# Patient Record
Sex: FEMALE | Race: WHITE | NOT HISPANIC OR LATINO | Employment: OTHER | URBAN - METROPOLITAN AREA
[De-identification: names, ages, dates, MRNs, and addresses within clinical notes are randomized per-mention and may not be internally consistent; named-entity substitution may affect disease eponyms.]

---

## 2017-01-12 ENCOUNTER — OFFICE VISIT (OUTPATIENT)
Dept: OTHER | Facility: HOSPITAL | Age: 72
End: 2017-01-12
Attending: THORACIC SURGERY (CARDIOTHORACIC VASCULAR SURGERY)

## 2017-01-12 VITALS
SYSTOLIC BLOOD PRESSURE: 154 MMHG | HEART RATE: 77 BPM | DIASTOLIC BLOOD PRESSURE: 88 MMHG | OXYGEN SATURATION: 99 % | HEIGHT: 66 IN | RESPIRATION RATE: 16 BRPM | WEIGHT: 148 LBS | BODY MASS INDEX: 23.78 KG/M2 | TEMPERATURE: 96.8 F

## 2017-01-12 DIAGNOSIS — C34.31 MALIGNANT NEOPLASM OF LOWER LOBE, RIGHT BRONCHUS OR LUNG (HCC): ICD-10-CM

## 2017-01-12 DIAGNOSIS — R91.8 MASS OF RIGHT LUNG: Primary | ICD-10-CM

## 2017-01-12 DIAGNOSIS — J43.8 OTHER EMPHYSEMA (HCC): ICD-10-CM

## 2017-01-12 PROBLEM — J44.9 COPD (CHRONIC OBSTRUCTIVE PULMONARY DISEASE): Status: ACTIVE | Noted: 2017-01-12

## 2017-01-12 PROCEDURE — 99205 OFFICE O/P NEW HI 60 MIN: CPT | Performed by: THORACIC SURGERY (CARDIOTHORACIC VASCULAR SURGERY)

## 2017-01-12 RX ORDER — SIMVASTATIN 40 MG
20 TABLET ORAL NIGHTLY
COMMUNITY
End: 2017-03-16 | Stop reason: SDUPTHER

## 2017-01-12 RX ORDER — LEVOTHYROXINE SODIUM 0.07 MG/1
75 TABLET ORAL DAILY
COMMUNITY
Start: 2017-01-03

## 2017-01-12 RX ORDER — AMOXICILLIN 500 MG
1200 CAPSULE ORAL
COMMUNITY
End: 2021-06-28

## 2017-01-12 RX ORDER — OMEPRAZOLE 20 MG/1
20 CAPSULE, DELAYED RELEASE ORAL DAILY
COMMUNITY
Start: 2017-01-03

## 2017-01-12 RX ORDER — MULTIVITAMIN
1 CAPSULE ORAL DAILY
COMMUNITY
End: 2021-07-17 | Stop reason: HOSPADM

## 2017-01-12 RX ORDER — ESTRADIOL 0.5 MG/1
0.5 TABLET ORAL DAILY
COMMUNITY
End: 2020-10-08

## 2017-01-12 RX ORDER — ASPIRIN 81 MG/1
81 TABLET ORAL DAILY
COMMUNITY
End: 2019-11-06

## 2017-01-12 NOTE — PROGRESS NOTES
Subjective   Patient ID: Jerilyn Martinez is a 71 y.o. female is being seen for consultation today at the request of Felisha Baez MD    History of Present Illness    Jerilyn Martinez was seen in care center at [facility] today January 12, 2017 as part of our multidisciplinary lung cancer clinic.  I have reviewed her history her x-rays and have examined her.  Thank you for asking us to participate in the care of Mrs. Martinez.    Mrs. Martinez is a 71-year-old  female.  She was seen by her family physician for her annual checkup.  She complained of cough and chest wall pain.  The chest x-ray was obtained because of her former smoking history and strong family history of lung cancer.  This was abnormal and a CT of the chest was then performed.  She is here now for further evaluation    She is a former smoker.  She has smoked for 15-20 years at least 2 packs cigarettes per day.  She has had significant secondhand smoke exposure.  She grew up in the [facility] in [place] and has had industrial exposure.  She has a dry nonproductive cough.  She has had no hemoptysis.  She has a history of gastroesophageal reflux disease and has a sore throat with some hoarseness.  She has intentional weight loss.  She gets short of breath climbing steps.  She has no personal history of cancer    The following portions of the patient's history were reviewed and updated as appropriate: allergies, current medications, past family history, past medical history, past social history, past surgical history and problem list.  Review of Systems   Constitution: Negative.        Trouble sleeping   HENT: Positive for sore throat.    Eyes: Negative.    Cardiovascular: Negative.    Respiratory: Positive for cough.    Endocrine: Negative.    Hematologic/Lymphatic: Negative.    Skin: Negative.    Musculoskeletal: Positive for back pain, joint pain, joint swelling and stiffness.   Gastrointestinal: Positive for heartburn.    Genitourinary: Negative.    Neurological: Negative.    Psychiatric/Behavioral: Negative.    All other systems reviewed and are negative.    Patient Active Problem List   Diagnosis   • Gastroesophageal reflux disease   • Iron deficiency anemia   • Long term current use of non-steroidal anti-inflammatories (NSAID)   • Mass of left lung   • Mass of right lung   • COPD (chronic obstructive pulmonary disease)     Past Medical History   Diagnosis Date   • Arthritis      Past Surgical History   Procedure Laterality Date   • Bunionectomy     • Thyroid surgery       Family History   Problem Relation Age of Onset   • Lung cancer Mother    • Lung cancer Father    • Lung cancer Sister      Social History     Social History   • Marital status: Single     Spouse name: N/A   • Number of children: N/A   • Years of education: N/A     Occupational History   • Not on file.     Social History Main Topics   • Smoking status: Former Smoker     Packs/day: 2.00     Years: 20.00     Types: Cigarettes     Quit date: 1987   • Smokeless tobacco: Not on file   • Alcohol use Yes      Comment: socially   • Drug use: Not on file   • Sexual activity: Not on file     Other Topics Concern   • Not on file     Social History Narrative   • No narrative on file       Current Outpatient Prescriptions:   •  aspirin 81 MG EC tablet, Take 81 mg by mouth., Disp: , Rfl:   •  Calcium Carbonate-Vitamin D 600-200 MG-UNIT tablet, Take  by mouth., Disp: , Rfl:   •  Cholecalciferol 1000 UNITS capsule, Take  by mouth., Disp: , Rfl:   •  cyanocobalamin 100 MCG tablet, Take  by mouth., Disp: , Rfl:   •  estradiol (ESTRACE) 0.5 MG tablet, Take 0.5 mg by mouth Daily., Disp: , Rfl:   •  levothyroxine (SYNTHROID, LEVOTHROID) 75 MCG tablet, , Disp: , Rfl:   •  Multiple Vitamin (MULTIVITAMIN) capsule, Take 1 capsule by mouth., Disp: , Rfl:   •  Omega-3 Fatty Acids (FISH OIL) 1200 MG capsule capsule, Take  by mouth., Disp: , Rfl:   •  omeprazole (priLOSEC) 20 MG capsule, ,  Disp: , Rfl:   •  simvastatin (ZOCOR) 40 MG tablet, Take 20 mg by mouth Daily., Disp: , Rfl:   Allergies   Allergen Reactions   • Codeine Shortness Of Breath        Objective   Vitals:    01/12/17 0858   BP: 154/88   Pulse: 77   Resp: 16   Temp: 96.8 °F (36 °C)   SpO2: 99%     Physical Exam   Constitutional: She is oriented to person, place, and time. Vital signs are normal. She appears well-developed.   HENT:   Head: Normocephalic and atraumatic.   Neck: Normal range of motion. Neck supple.   Cardiovascular: Normal rate, regular rhythm, normal heart sounds and intact distal pulses.    No murmur heard.  Pulmonary/Chest: Effort normal and breath sounds normal. She has no wheezes. She has no rhonchi. She has no rales. She exhibits no tenderness.   Abdominal: Soft. There is no tenderness.   Musculoskeletal: Normal range of motion. She exhibits no edema or tenderness.   Neurological: She is alert and oriented to person, place, and time. She has normal strength.   Skin: Skin is warm and dry. No rash noted. No cyanosis or erythema.   Psychiatric: She has a normal mood and affect. Her behavior is normal.       Assessment/Plan   Independent Review of Radiographic Studies:    CT scan of the chest was independently reviewed.  There is a 2 cm mass in the right upper lobe of the lung. This is spiculated and has all the characteristics of a primary lung cancer.  There is a 3 mm noncalcified nodule in the left lower lobe.  There are no hilar or mediastinal lymph nodes.  There is a 1.2 cm left thyroid nodule.  There is a pectus deformity of the chest wall.  There is a 1.7 cm cyst of the upper pole of the left kidney.  No other abnormalities were seen on x-ray.    Discussion: Given the information at hand it appears that this lady has a clinical T1 N0 Mx  carcinoma of the right lung.  I have recommended a CT directed needle biopsy for tissue confirmation.  I have ordered a CT PET scan to complete the staging. Because of her long  smoking history, we have also ordered pulmonary function tests  to evaluate her pulmonary reserve.  Once these studies are completed and the information is available her case will be discussed in our multidisciplinary thoracic oncology conference.  I will keep you informed of her progress.  Thank you for allowing me to participate in the care of Mrs. Martinez.    Diagnoses and all orders for this visit:    Mass of right lung  -     CT Needle Biopsy Lung; Future  -     NM Pet Skull Base To Mid Thigh; Future    Other emphysema  -     Full Pulmonary Function Test With Bronchodilator & ABG; Future    Malignant neoplasm of lower lobe, right bronchus or lung   -     NM Pet Skull Base To Mid Thigh; Future    Other orders  -     simvastatin (ZOCOR) 40 MG tablet; Take 20 mg by mouth Daily.  -     aspirin 81 MG EC tablet; Take 81 mg by mouth.  -     Calcium Carbonate-Vitamin D 600-200 MG-UNIT tablet; Take  by mouth.  -     Cholecalciferol 1000 UNITS capsule; Take  by mouth.  -     cyanocobalamin 100 MCG tablet; Take  by mouth.  -     Multiple Vitamin (MULTIVITAMIN) capsule; Take 1 capsule by mouth.  -     Omega-3 Fatty Acids (FISH OIL) 1200 MG capsule capsule; Take  by mouth.  -     levothyroxine (SYNTHROID, LEVOTHROID) 75 MCG tablet;   -     omeprazole (priLOSEC) 20 MG capsule;   -     estradiol (ESTRACE) 0.5 MG tablet; Take 0.5 mg by mouth Daily.

## 2017-01-12 NOTE — MR AVS SNAPSHOT
Jerilyn Martinez   2017 8:30 AM   Office Visit    Dept Phone:  [phone]   Encounter #:  [id number]    Provider:  Yuri Brizuela III, MD   Department:  [facility] MULTIDISCIPLINARY CLINIC                Your Full Care Plan              Your Updated Medication List          This list is accurate as of: 17  9:57 AM.  Always use your most recent med list.                aspirin 81 MG EC tablet       Calcium Carbonate-Vitamin D 600-200 MG-UNIT tablet       Cholecalciferol 1000 UNITS capsule       cyanocobalamin 100 MCG tablet       estradiol 0.5 MG tablet   Commonly known as:  ESTRACE       fish oil 1200 MG capsule capsule       levothyroxine 75 MCG tablet   Commonly known as:  SYNTHROID, LEVOTHROID       multivitamin capsule       omeprazole 20 MG capsule   Commonly known as:  priLOSEC       simvastatin 40 MG tablet   Commonly known as:  ZOCOR               Instructions     None    Patient Instructions History      Upcoming Appointments     Visit Type Date Time Department    LUNG CARE VISIT 2017  8:30 AM [facility] DIS[facility]      [employer] Signup     [facility] [employer] allows you to send messages to your doctor, view your test results, renew your prescriptions, schedule appointments, and more. To sign up, go to [employer] and click on the Sign Up Now link in the New User? box. Enter your [employer] Activation Code exactly as it appears below along with the last four digits of your Social Security Number and your Date of Birth () to complete the sign-up process. If you do not sign up before the expiration date, you must request a new code.    [employer] Activation Code: [email or web address]  Expires: 2017  9:57 AM    If you have questions, you can email [email or web address] or call [phone] to talk to our [employer] staff. Remember, [employer] is NOT to be used for urgent needs. For medical emergencies, dial 911.               Other Info  "from Your Visit           Allergies     Codeine  Shortness Of Breath      Reason for Visit     Lung Nodule           Vital Signs     Blood Pressure Pulse Temperature Respirations Height Weight    154/88 77 96.8 °F (36 °C) (Oral) 16 66\" (167.6 cm) 148 lb (67.1 kg)    Oxygen Saturation Body Mass Index Smoking Status             99% 23.89 kg/m2 Former Smoker           "

## 2017-01-12 NOTE — LETTER
January 12, 2017     Felisha Baez MD  [address]  [address] [address]    Patient: Jerilyn Martinez   YOB: 1945   Date of Visit: 1/12/2017       Dear Dr. Sasha MD:    Thank you for referring Jerilyn Martinez to me for evaluation. Below are the relevant portions of my assessment and plan of care.    If you have questions, please do not hesitate to call me. I look forward to following Jerilyn along with you.         Sincerely,        Yuri Brizuela III, MD        CC: MD Yuri Storey III, MD  1/12/2017  2:19 PM  Sign at close encounter  Subjective   Patient ID: Jerilyn Martinez is a 71 y.o. female is being seen for consultation today at the request of Felisha Baez MD    History of Present Illness    Jerilyn Martinez [facility], 2017 as part of our multidisciplinary lung cancer clinic.  I have reviewed her history her x-rays and have examined her.  Thank you for asking us to participate in the care of Mrs. Martinez.    Mrs. Martinez is a 71-year-old  female.  She was seen by her family physician for her annual checkup.  She complained of cough and chest wall pain.  The chest x-ray was obtained because of her former smoking history and strong family history of lung cancer.  This was abnormal and a CT of the chest was then performed.  She is here now for further evaluation    She is a former smoker.  She has smoked for 15-20 years at least 2 packs cigarettes per day.  She has had significant secondhand smoke exposure.  She grew up in the [facility] area in [place] and has had industrial exposure.  She has a dry nonproductive cough.  She has had no hemoptysis.  She has a history of gastroesophageal reflux disease and has a sore throat with some hoarseness.  She has intentional weight loss.  She gets short of breath climbing steps.  She has no personal history of cancer    The following portions of the  patient's history were reviewed and updated as appropriate: allergies, current medications, past family history, past medical history, past social history, past surgical history and problem list.  Review of Systems   Constitution: Negative.        Trouble sleeping   HENT: Positive for sore throat.    Eyes: Negative.    Cardiovascular: Negative.    Respiratory: Positive for cough.    Endocrine: Negative.    Hematologic/Lymphatic: Negative.    Skin: Negative.    Musculoskeletal: Positive for back pain, joint pain, joint swelling and stiffness.   Gastrointestinal: Positive for heartburn.   Genitourinary: Negative.    Neurological: Negative.    Psychiatric/Behavioral: Negative.    All other systems reviewed and are negative.    Patient Active Problem List   Diagnosis   • Gastroesophageal reflux disease   • Iron deficiency anemia   • Long term current use of non-steroidal anti-inflammatories (NSAID)   • Mass of left lung   • Mass of right lung   • COPD (chronic obstructive pulmonary disease)     Past Medical History   Diagnosis Date   • Arthritis      Past Surgical History   Procedure Laterality Date   • Bunionectomy     • Thyroid surgery       Family History   Problem Relation Age of Onset   • Lung cancer Mother    • Lung cancer Father    • Lung cancer Sister      Social History     Social History   • Marital status: Single     Spouse name: N/A   • Number of children: N/A   • Years of education: N/A     Occupational History   • Not on file.     Social History Main Topics   • Smoking status: Former Smoker     Packs/day: 2.00     Years: 20.00     Types: Cigarettes     Quit date: 1987   • Smokeless tobacco: Not on file   • Alcohol use Yes      Comment: socially   • Drug use: Not on file   • Sexual activity: Not on file     Other Topics Concern   • Not on file     Social History Narrative   • No narrative on file       Current Outpatient Prescriptions:   •  aspirin 81 MG EC tablet, Take 81 mg by mouth., Disp: , Rfl:   •   Calcium Carbonate-Vitamin D 600-200 MG-UNIT tablet, Take  by mouth., Disp: , Rfl:   •  Cholecalciferol 1000 UNITS capsule, Take  by mouth., Disp: , Rfl:   •  cyanocobalamin 100 MCG tablet, Take  by mouth., Disp: , Rfl:   •  estradiol (ESTRACE) 0.5 MG tablet, Take 0.5 mg by mouth Daily., Disp: , Rfl:   •  levothyroxine (SYNTHROID, LEVOTHROID) 75 MCG tablet, , Disp: , Rfl:   •  Multiple Vitamin (MULTIVITAMIN) capsule, Take 1 capsule by mouth., Disp: , Rfl:   •  Omega-3 Fatty Acids (FISH OIL) 1200 MG capsule capsule, Take  by mouth., Disp: , Rfl:   •  omeprazole (priLOSEC) 20 MG capsule, , Disp: , Rfl:   •  simvastatin (ZOCOR) 40 MG tablet, Take 20 mg by mouth Daily., Disp: , Rfl:   Allergies   Allergen Reactions   • Codeine Shortness Of Breath        Objective   Vitals:    01/12/17 0858   BP: 154/88   Pulse: 77   Resp: 16   Temp: 96.8 °F (36 °C)   SpO2: 99%     Physical Exam   Constitutional: She is oriented to person, place, and time. Vital signs are normal. She appears well-developed.   HENT:   Head: Normocephalic and atraumatic.   Neck: Normal range of motion. Neck supple.   Cardiovascular: Normal rate, regular rhythm, normal heart sounds and intact distal pulses.    No murmur heard.  Pulmonary/Chest: Effort normal and breath sounds normal. She has no wheezes. She has no rhonchi. She has no rales. She exhibits no tenderness.   Abdominal: Soft. There is no tenderness.   Musculoskeletal: Normal range of motion. She exhibits no edema or tenderness.   Neurological: She is alert and oriented to person, place, and time. She has normal strength.   Skin: Skin is warm and dry. No rash noted. No cyanosis or erythema.   Psychiatric: She has a normal mood and affect. Her behavior is normal.       Assessment/Plan   Independent Review of Radiographic Studies:    CT scan of the chest was independently reviewed.  There is a 2 cm mass in the right upper lobe of the lung. This is spiculated and has all the characteristics of a  primary lung cancer.  There is a 3 mm noncalcified nodule in the left lower lobe.  There are no hilar or mediastinal lymph nodes.  There is a 1.2 cm left thyroid nodule.  There is a pectus deformity of the chest wall.  There is a 1.7 cm cyst of the upper pole of the left kidney.  No other abnormalities were seen on x-ray.    Discussion: Given the information at hand it appears that this lady has a clinical T1 N0 Mx  carcinoma of the right lung.  I have recommended a CT directed needle biopsy for tissue confirmation.  I have ordered a CT PET scan to complete the staging. Because of her long smoking history, we have also ordered pulmonary function tests  to evaluate her pulmonary reserve.  Once these studies are completed and the information is available her case will be discussed in our multidisciplinary thoracic oncology conference.  I will keep you informed of her progress.  Thank you for allowing me to participate in the care of Mrs. Martinez.    Diagnoses and all orders for this visit:    Mass of right lung  -     CT Needle Biopsy Lung; Future  -     NM Pet Skull Base To Mid Thigh; Future    Other emphysema  -     Full Pulmonary Function Test With Bronchodilator & ABG; Future    Malignant neoplasm of lower lobe, right bronchus or lung   -     NM Pet Skull Base To Mid Thigh; Future    Other orders  -     simvastatin (ZOCOR) 40 MG tablet; Take 20 mg by mouth Daily.  -     aspirin 81 MG EC tablet; Take 81 mg by mouth.  -     Calcium Carbonate-Vitamin D 600-200 MG-UNIT tablet; Take  by mouth.  -     Cholecalciferol 1000 UNITS capsule; Take  by mouth.  -     cyanocobalamin 100 MCG tablet; Take  by mouth.  -     Multiple Vitamin (MULTIVITAMIN) capsule; Take 1 capsule by mouth.  -     Omega-3 Fatty Acids (FISH OIL) 1200 MG capsule capsule; Take  by mouth.  -     levothyroxine (SYNTHROID, LEVOTHROID) 75 MCG tablet;   -     omeprazole (priLOSEC) 20 MG capsule;   -     estradiol (ESTRACE) 0.5 MG tablet; Take 0.5 mg by  mouth Daily.

## 2017-01-12 NOTE — PROGRESS NOTES
Pt seen by Dr. Brizuela and will be scheduled for PFT's, CTG bx and PET scan. Pt will be presented to thoracic conference and will return to lung care center. Pt given instructions for tests and contact cards for Dr. Brizuela and nurse navigator. Pt and family members given info for GRAIL study.

## 2017-01-16 ENCOUNTER — HOSPITAL ENCOUNTER (OUTPATIENT)
Dept: RESPIRATORY THERAPY | Facility: HOSPITAL | Age: 72
Discharge: HOME | End: 2017-01-16
Attending: THORACIC SURGERY (CARDIOTHORACIC VASCULAR SURGERY) | Admitting: THORACIC SURGERY (CARDIOTHORACIC VASCULAR SURGERY)

## 2017-01-16 DIAGNOSIS — J43.8 OTHER EMPHYSEMA (HCC): ICD-10-CM

## 2017-01-16 LAB
ARTERIAL PATENCY WRIST A: POSITIVE
ATMOSPHERIC PRESS: 753.7 MMHG
BASE EXCESS BLDA CALC-SCNC: 2.4 MMOL/L (ref 0–2)
BDY SITE: ABNORMAL
HCO3 BLDA-SCNC: 26.6 MMOL/L (ref 22–28)
MODALITY: ABNORMAL
PCO2 BLDA: 39.1 MM HG (ref 35–45)
PH BLDA: 7.44 PH UNITS (ref 7.35–7.45)
PO2 BLDA: 77.7 MM HG (ref 80–100)
SAO2 % BLDCOA: 95.9 % (ref 92–99)
TOTAL RATE: 20 BREATHS/MINUTE

## 2017-01-16 PROCEDURE — 94729 DIFFUSING CAPACITY: CPT

## 2017-01-16 PROCEDURE — 94060 EVALUATION OF WHEEZING: CPT

## 2017-01-16 PROCEDURE — A9270 NON-COVERED ITEM OR SERVICE: HCPCS | Performed by: THORACIC SURGERY (CARDIOTHORACIC VASCULAR SURGERY)

## 2017-01-16 PROCEDURE — 94726 PLETHYSMOGRAPHY LUNG VOLUMES: CPT

## 2017-01-16 PROCEDURE — 82803 BLOOD GASES ANY COMBINATION: CPT

## 2017-01-16 PROCEDURE — 36600 WITHDRAWAL OF ARTERIAL BLOOD: CPT

## 2017-01-16 PROCEDURE — 94640 AIRWAY INHALATION TREATMENT: CPT

## 2017-01-16 PROCEDURE — 63710000001 ALBUTEROL PER 1 MG: Performed by: THORACIC SURGERY (CARDIOTHORACIC VASCULAR SURGERY)

## 2017-01-16 RX ORDER — ALBUTEROL SULFATE 2.5 MG/3ML
2.5 SOLUTION RESPIRATORY (INHALATION) ONCE
Status: COMPLETED | OUTPATIENT
Start: 2017-01-16 | End: 2017-01-16

## 2017-01-16 RX ADMIN — ALBUTEROL SULFATE 2.5 MG: 2.5 SOLUTION RESPIRATORY (INHALATION) at 14:09

## 2017-01-19 ENCOUNTER — HOSPITAL ENCOUNTER (OUTPATIENT)
Dept: GENERAL RADIOLOGY | Facility: HOSPITAL | Age: 72
Discharge: HOME | End: 2017-01-19
Attending: THORACIC SURGERY (CARDIOTHORACIC VASCULAR SURGERY)

## 2017-01-19 ENCOUNTER — HOSPITAL ENCOUNTER (OUTPATIENT)
Dept: CT IMAGING | Facility: HOSPITAL | Age: 72
Discharge: HOME | End: 2017-01-19
Attending: THORACIC SURGERY (CARDIOTHORACIC VASCULAR SURGERY) | Admitting: THORACIC SURGERY (CARDIOTHORACIC VASCULAR SURGERY)

## 2017-01-19 VITALS
WEIGHT: 148 LBS | SYSTOLIC BLOOD PRESSURE: 122 MMHG | OXYGEN SATURATION: 99 % | HEIGHT: 66 IN | TEMPERATURE: 97 F | DIASTOLIC BLOOD PRESSURE: 74 MMHG | HEART RATE: 80 BPM | RESPIRATION RATE: 18 BRPM | BODY MASS INDEX: 23.78 KG/M2

## 2017-01-19 DIAGNOSIS — R91.8 MASS OF RIGHT LUNG: ICD-10-CM

## 2017-01-19 LAB
INR PPP: 1 (ref 0.8–1.2)
PROTHROMBIN TIME: 12.5 SECONDS (ref 12.8–15.2)

## 2017-01-19 PROCEDURE — 88305 TISSUE EXAM BY PATHOLOGIST: CPT | Performed by: THORACIC SURGERY (CARDIOTHORACIC VASCULAR SURGERY)

## 2017-01-19 PROCEDURE — 88342 IMHCHEM/IMCYTCHM 1ST ANTB: CPT | Performed by: THORACIC SURGERY (CARDIOTHORACIC VASCULAR SURGERY)

## 2017-01-19 PROCEDURE — 71010 HC CHEST PA OR AP: CPT

## 2017-01-19 PROCEDURE — 88341 IMHCHEM/IMCYTCHM EA ADD ANTB: CPT | Performed by: THORACIC SURGERY (CARDIOTHORACIC VASCULAR SURGERY)

## 2017-01-19 PROCEDURE — 77012 CT SCAN FOR NEEDLE BIOPSY: CPT

## 2017-01-19 RX ORDER — ASCORBIC ACID/MULTIVIT-MIN 1000 MG
1 EFFERVESCENT POWDER IN PACKET ORAL DAILY PRN
COMMUNITY
End: 2020-01-16

## 2017-01-19 RX ORDER — MELOXICAM 15 MG/1
15 TABLET ORAL DAILY PRN
COMMUNITY
End: 2017-10-11

## 2017-01-19 RX ORDER — LIDOCAINE WITH 8.4% SOD BICARB 0.9%(10ML)
10 SYRINGE (ML) INJECTION ONCE
Status: COMPLETED | OUTPATIENT
Start: 2017-01-19 | End: 2017-01-19

## 2017-01-19 RX ADMIN — Medication 10 ML: at 09:51

## 2017-01-20 ENCOUNTER — TELEPHONE (OUTPATIENT)
Dept: INTERVENTIONAL RADIOLOGY/VASCULAR | Facility: HOSPITAL | Age: 72
End: 2017-01-20

## 2017-01-24 ENCOUNTER — HOSPITAL ENCOUNTER (OUTPATIENT)
Dept: PET IMAGING | Facility: HOSPITAL | Age: 72
Discharge: HOME | End: 2017-01-24
Attending: THORACIC SURGERY (CARDIOTHORACIC VASCULAR SURGERY)

## 2017-01-24 ENCOUNTER — HOSPITAL ENCOUNTER (OUTPATIENT)
Dept: PET IMAGING | Facility: HOSPITAL | Age: 72
Discharge: HOME | End: 2017-01-24
Attending: THORACIC SURGERY (CARDIOTHORACIC VASCULAR SURGERY) | Admitting: THORACIC SURGERY (CARDIOTHORACIC VASCULAR SURGERY)

## 2017-01-24 DIAGNOSIS — R91.8 MASS OF RIGHT LUNG: ICD-10-CM

## 2017-01-24 DIAGNOSIS — C34.31 MALIGNANT NEOPLASM OF LOWER LOBE, RIGHT BRONCHUS OR LUNG (HCC): ICD-10-CM

## 2017-01-24 PROCEDURE — 78815 PET IMAGE W/CT SKULL-THIGH: CPT

## 2017-01-24 PROCEDURE — A9552 F18 FDG: HCPCS | Performed by: THORACIC SURGERY (CARDIOTHORACIC VASCULAR SURGERY)

## 2017-01-24 PROCEDURE — 0 FLUDEOXYGLUCOSE F18 SOLUTION: Performed by: THORACIC SURGERY (CARDIOTHORACIC VASCULAR SURGERY)

## 2017-01-24 RX ADMIN — FLUDEOXYGLUCOSE F18 1 DOSE: 300 INJECTION INTRAVENOUS at 09:00

## 2017-01-26 ENCOUNTER — PREP FOR SURGERY (OUTPATIENT)
Dept: SURGERY | Facility: CLINIC | Age: 72
End: 2017-01-26

## 2017-01-26 ENCOUNTER — OFFICE VISIT (OUTPATIENT)
Dept: OTHER | Facility: HOSPITAL | Age: 72
End: 2017-01-26
Attending: THORACIC SURGERY (CARDIOTHORACIC VASCULAR SURGERY)

## 2017-01-26 ENCOUNTER — OFFICE VISIT (OUTPATIENT)
Dept: OTHER | Facility: HOSPITAL | Age: 72
End: 2017-01-26
Attending: INTERNAL MEDICINE

## 2017-01-26 VITALS
WEIGHT: 146 LBS | DIASTOLIC BLOOD PRESSURE: 87 MMHG | BODY MASS INDEX: 23.46 KG/M2 | HEART RATE: 78 BPM | RESPIRATION RATE: 16 BRPM | HEIGHT: 66 IN | OXYGEN SATURATION: 99 % | TEMPERATURE: 96.7 F | SYSTOLIC BLOOD PRESSURE: 136 MMHG

## 2017-01-26 VITALS
SYSTOLIC BLOOD PRESSURE: 136 MMHG | WEIGHT: 146 LBS | TEMPERATURE: 96.7 F | OXYGEN SATURATION: 99 % | HEART RATE: 78 BPM | BODY MASS INDEX: 23.46 KG/M2 | HEIGHT: 66 IN | RESPIRATION RATE: 16 BRPM | DIASTOLIC BLOOD PRESSURE: 87 MMHG

## 2017-01-26 DIAGNOSIS — R79.1 ABNORMAL COAGULATION PROFILE: ICD-10-CM

## 2017-01-26 DIAGNOSIS — C34.11 MALIGNANT NEOPLASM OF UPPER LOBE OF RIGHT LUNG (HCC): Primary | ICD-10-CM

## 2017-01-26 PROBLEM — R91.8 MASS OF LEFT LUNG: Status: RESOLVED | Noted: 2017-01-12 | Resolved: 2017-01-26

## 2017-01-26 PROCEDURE — G0463 HOSPITAL OUTPT CLINIC VISIT: HCPCS | Performed by: INTERNAL MEDICINE

## 2017-01-26 PROCEDURE — G0463 HOSPITAL OUTPT CLINIC VISIT: HCPCS

## 2017-01-26 PROCEDURE — 99203 OFFICE O/P NEW LOW 30 MIN: CPT | Performed by: INTERNAL MEDICINE

## 2017-01-26 PROCEDURE — 99213 OFFICE O/P EST LOW 20 MIN: CPT | Performed by: THORACIC SURGERY (CARDIOTHORACIC VASCULAR SURGERY)

## 2017-01-26 RX ORDER — CEFAZOLIN SODIUM 2 G/100ML
2 INJECTION, SOLUTION INTRAVENOUS ONCE
Status: CANCELLED | OUTPATIENT
Start: 2017-01-26 | End: 2017-01-26

## 2017-01-26 RX ORDER — SODIUM CHLORIDE 0.9 % (FLUSH) 0.9 %
1-10 SYRINGE (ML) INJECTION AS NEEDED
Status: CANCELLED | OUTPATIENT
Start: 2017-01-26

## 2017-01-26 RX ORDER — SODIUM CHLORIDE 9 MG/ML
75 INJECTION, SOLUTION INTRAVENOUS CONTINUOUS
Status: CANCELLED | OUTPATIENT
Start: 2017-01-26

## 2017-01-26 NOTE — PROGRESS NOTES
Subjective   Patient ID: Jerilyn Martinez is a 71 y.o. female is here today for follow-up.    History of Present Illness    Jerilyn Martinez was seen in the lung care center at [facility] today January 26, 2017 as part of our multidisciplinary lung cancer clinic.  Since her initial evaluation she has undergone a CT directed needle biopsy of the right upper lobe lung mass.  She has also had a CT PET scan and pulmonary function tests.  Her case was discussed in our multidisciplinary thoracic oncology conference this morning.  She is here today to discuss the test results and treatment options.    The needle biopsy went without problems.  She experienced no pleuritic pain or shortness of breath after the biopsy.  She has had no hemoptysis.    The following portions of the patient's history were reviewed and updated as appropriate: allergies, current medications, past family history, past medical history, past social history, past surgical history and problem list.  Review of Systems   Constitution: Negative.   HENT: Negative.    Eyes: Negative.    Cardiovascular: Negative.    Respiratory: Positive for cough.    Endocrine: Negative.    Hematologic/Lymphatic: Negative.    Skin: Negative.    Musculoskeletal: Positive for back pain, joint pain, joint swelling and stiffness.   Gastrointestinal: Positive for heartburn.   Genitourinary: Negative.    Neurological: Negative.    Psychiatric/Behavioral: Negative.    All other systems reviewed and are negative.    Patient Active Problem List   Diagnosis   • Gastroesophageal reflux disease   • Iron deficiency anemia   • Long term current use of non-steroidal anti-inflammatories (NSAID)   • Mass of right lung   • COPD (chronic obstructive pulmonary disease)   • Malignant neoplasm of upper lobe of right lung     Past Medical History   Diagnosis Date   • Arthritis    • Disease of thyroid gland    • GERD (gastroesophageal reflux disease)    • Lung cancer      Past Surgical  History   Procedure Laterality Date   • Bunionectomy     • Thyroid surgery       nodule removed   • Tonsillectomy and adenoidectomy     • Appendectomy     • Hysterectomy     • Lasik       Family History   Problem Relation Age of Onset   • Lung cancer Mother    • Lung cancer Father    • Lung cancer Sister      Social History     Social History   • Marital status: Single     Spouse name: N/A   • Number of children: N/A   • Years of education: N/A     Occupational History   • Not on file.     Social History Main Topics   • Smoking status: Former Smoker     Packs/day: 2.00     Years: 20.00     Types: Cigarettes     Quit date: 1987   • Smokeless tobacco: Not on file   • Alcohol use Yes      Comment: socially   • Drug use: Not on file   • Sexual activity: Not on file     Other Topics Concern   • Not on file     Social History Narrative       Current Outpatient Prescriptions:   •  aspirin 81 MG EC tablet, Take 81 mg by mouth., Disp: , Rfl:   •  Calcium Carbonate-Vitamin D 600-200 MG-UNIT tablet, Take  by mouth., Disp: , Rfl:   •  Cholecalciferol 1000 UNITS capsule, Take  by mouth., Disp: , Rfl:   •  cyanocobalamin 100 MCG tablet, Take  by mouth., Disp: , Rfl:   •  estradiol (ESTRACE) 0.5 MG tablet, Take 0.5 mg by mouth Daily., Disp: , Rfl:   •  levothyroxine (SYNTHROID, LEVOTHROID) 75 MCG tablet, , Disp: , Rfl:   •  meloxicam (MOBIC) 15 MG tablet, Take 15 mg by mouth Daily As Needed., Disp: , Rfl:   •  Multiple Vitamin (MULTIVITAMIN) capsule, Take 1 capsule by mouth., Disp: , Rfl:   •  Multiple Vitamins-Minerals (EMERGEN-C VITAMIN C) pack, Take 1 package by mouth Daily As Needed., Disp: , Rfl:   •  Omega-3 Fatty Acids (FISH OIL) 1200 MG capsule capsule, Take  by mouth., Disp: , Rfl:   •  omeprazole (priLOSEC) 20 MG capsule, , Disp: , Rfl:   •  simvastatin (ZOCOR) 40 MG tablet, Take 20 mg by mouth Daily., Disp: , Rfl:   Allergies   Allergen Reactions   • Codeine Shortness Of Breath        Objective   Vitals:    01/26/17  0756   BP: 136/87   Pulse: 78   Resp: 16   Temp: 96.7 °F (35.9 °C)   SpO2: 99%     Physical Exam   Constitutional: She is oriented to person, place, and time. Vital signs are normal. She appears well-developed.   HENT:   Head: Normocephalic and atraumatic.   Neck: Normal range of motion. Neck supple.   Cardiovascular: Normal rate, regular rhythm, normal heart sounds and intact distal pulses.    No murmur heard.  Pulmonary/Chest: Effort normal and breath sounds normal. She has no wheezes. She has no rhonchi. She has no rales. She exhibits no tenderness.   Abdominal: Soft. There is no tenderness.   Musculoskeletal: Normal range of motion. She exhibits no edema or tenderness.   Neurological: She is alert and oriented to person, place, and time. She has normal strength.   Skin: Skin is warm and dry. No rash noted. No cyanosis or erythema.   Psychiatric: She has a normal mood and affect. Her behavior is normal.       Assessment/Plan   Independent Review of Radiographic Studies:    CT PET scan was independently reviewed.  The 2 cm mass in the right upper lobe of the lung has a standard uptake value of 15.0.  There is no uptake in the hilum or mediastinum.  Neck chest abdomen and pelvis showed no uptake.  There was some uptake in the nasopharynx but no specific mass was identified.    CT directed needle biopsy was reviewed.  Pathology shows adenocarcinoma primary lung.    Pulmonary function tests were also reviewed.  The FVC was 2.32 which is 71% of predicted.  The FEV1 is 1.74 which is 71% predicted.  FEV1 FVC ratio is 75.  The diffusion capacity DLCO is 16.3 which is 89% of predicted.    Discussion: This lady has a stage I carcinoma of the right upper lobe of the lung.  We have recommended that she have a right upper lobectomy.  Prior to that surgery she will be evaluated by ENT for the finding on PET Scan  in the nasopharynx.    I have discussed with her and her family robot-assisted right upper lobectomy.  I've  explained the procedure as well as the risks and benefits.  I have answered all of her questions.  The patient has requested that we proceed.  I will keep you informed of her progress.      Diagnoses and all orders for this visit:    Malignant neoplasm of upper lobe of right lung  -     Case request  -     Ambulatory Referral to ENT (Otolaryngology)

## 2017-01-26 NOTE — MR AVS SNAPSHOT
Jerilyn Martinez   2017 8:00 AM   Office Visit    Dept Phone:  [phone]   Encounter #:  [id number]    Provider:  Yuri Brizuela III, MD   Department:  [facility] MULTI-DISCIPLINARY CLINIC                Your Full Care Plan              Your Updated Medication List          This list is accurate as of: 17  9:22 AM.  Always use your most recent med list.                aspirin 81 MG EC tablet       Calcium Carbonate-Vitamin D 600-200 MG-UNIT tablet       Cholecalciferol 1000 UNITS capsule       cyanocobalamin 100 MCG tablet       EMERGEN-C VITAMIN C pack       estradiol 0.5 MG tablet   Commonly known as:  ESTRACE       fish oil 1200 MG capsule capsule       levothyroxine 75 MCG tablet   Commonly known as:  SYNTHROID, LEVOTHROID       MOBIC 15 MG tablet   Generic drug:  meloxicam       multivitamin capsule       omeprazole 20 MG capsule   Commonly known as:  priLOSEC       simvastatin 40 MG tablet   Commonly known as:  ZOCOR               Instructions     None    Patient Instructions History      Upcoming Appointments     Visit Type Date Time Department    LUNG [facility]ISIT 2017  8:00 AM [facility]    LUNG CARE VISIT 2017  9:00 AM [facility] MULTI DISC CLIN      MyChart Signup     [facility] [employer] allows you to send messages to your doctor, view your test results, renew your prescriptions, schedule appointments, and more. To sign up, go to UmbaBox and click on the Sign Up Now link in the New User? box. Enter your Telx Activation Code exactly as it appears below along with the last four digits of your Social Security Number and your Date of Birth () to complete the sign-up process. If you do not sign up before the expiration date, you must request a new code.    Telx Activation Code: [email or web address]  Expires: 2017  9:57 AM    If you have questions, you can email [email or web address] or call [phone]  "to talk to our MyChart staff. Remember, MyChart is NOT to be used for urgent needs. For medical emergencies, dial 911.               Other Info from Your Visit           Allergies     Codeine  Shortness Of Breath      Reason for Visit     Lung Cancer           Vital Signs     Blood Pressure Pulse Temperature Respirations Height Weight    136/87 78 96.7 °F (35.9 °C) (Oral) 16 66\" (167.6 cm) 146 lb (66.2 kg)    Oxygen Saturation Body Mass Index Smoking Status             99% 23.57 kg/m2 Former Smoker           "

## 2017-01-26 NOTE — PROGRESS NOTES
Subjective  feels overwhelmed by amount of information and findings    REASON FOR CONSULTATION:    Provide an opinion on any further workup or treatment                             REQUESTING PHYSICIAN: Felisha Baez MD    RECORDS OBTAINED:  Records of the patients history including those obtained from the referring provider were reviewed and summarized in detail.    HISTORY OF PRESENT ILLNESS:  The patient is a 71 y.o. year old female who is here for an opinion about the above issue.    History of Present Illness   Mrs. Martinez is a 71-year-old  female. She was seen by her family physician for her annual checkup. She complained of cough and chest wall pain. The chest x-ray was obtained because of her former smoking history and strong family history of lung cancer. This was abnormal and a CT of the chest was then performed. She is here now for further evaluation     She is a former smoker. She has smoked for 15-20 years at least 2 packs cigarettes per day. She has had significant secondhand smoke exposure. She grew up in the [facility] in [place] and has had industrial exposure. She has a dry nonproductive cough. She has had no hemoptysis. She has a history of gastroesophageal reflux disease and has a sore throat with some hoarseness. She has intentional weight loss. She gets short of breath climbing steps. She has no personal history of cancer    The patient was seen by Dr. Brizuela 1/12/2017 who proceeded with CT-guided needle biopsy and PET/CT.  The needle biopsy returned as consistent with adenocarcinoma with lung origin (molecular testing pending) and PET CT revealed 2 cm lesion superior lateral aspect right upper lobe intensely metabolically active with SUV of 15.0.  There is no evidence of kerry metastasis regionally or systemic metastatic disease area this incidental nodular focus of uptake in the posterior aspect the right nasopharynx with an SUV of 4.7.  Past Medical History   Diagnosis  Date   • Arthritis    • Disease of thyroid gland    • GERD (gastroesophageal reflux disease)    • Lung cancer         Past Surgical History   Procedure Laterality Date   • Bunionectomy     • Thyroid surgery       nodule removed   • Tonsillectomy and adenoidectomy     • Appendectomy     • Hysterectomy     • Lasik          Current Outpatient Prescriptions on File Prior to Visit   Medication Sig Dispense Refill   • aspirin 81 MG EC tablet Take 81 mg by mouth.     • Calcium Carbonate-Vitamin D 600-200 MG-UNIT tablet Take  by mouth.     • Cholecalciferol 1000 UNITS capsule Take  by mouth.     • cyanocobalamin 100 MCG tablet Take  by mouth.     • estradiol (ESTRACE) 0.5 MG tablet Take 0.5 mg by mouth Daily.     • levothyroxine (SYNTHROID, LEVOTHROID) 75 MCG tablet      • meloxicam (MOBIC) 15 MG tablet Take 15 mg by mouth Daily As Needed.     • Multiple Vitamin (MULTIVITAMIN) capsule Take 1 capsule by mouth.     • Multiple Vitamins-Minerals (EMERGEN-C VITAMIN C) pack Take 1 package by mouth Daily As Needed.     • Omega-3 Fatty Acids (FISH OIL) 1200 MG capsule capsule Take  by mouth.     • omeprazole (priLOSEC) 20 MG capsule      • simvastatin (ZOCOR) 40 MG tablet Take 20 mg by mouth Daily.       No current facility-administered medications on file prior to visit.         ALLERGIES:    Allergies   Allergen Reactions   • Codeine Shortness Of Breath        Social History     Social History   • Marital status: Single     Spouse name: N/A   • Number of children: N/A   • Years of education: N/A     Social History Main Topics   • Smoking status: Former Smoker     Packs/day: 2.00     Years: 20.00     Types: Cigarettes     Quit date: 1987   • Smokeless tobacco: Not on file   • Alcohol use Yes      Comment: socially   • Drug use: Not on file   • Sexual activity: Not on file     Other Topics Concern   • Not on file     Social History Narrative        Family History   Problem Relation Age of Onset   • Lung cancer Mother    • Lung  "cancer Father    • Lung cancer Sister         Review of Systems   Constitution: Negative.   Trouble sleeping   HENT: Positive for sore throat and sinus symptoms  Eyes: Negative.   Cardiovascular: Negative.   Respiratory: Positive for cough.   Endocrine: Negative.   Hematologic/Lymphatic: Negative.   Skin: Negative.   Musculoskeletal: Positive for back pain, joint pain, joint swelling and stiffness.   Gastrointestinal: Positive for heartburn.   Genitourinary: Negative.   Neurological: Negative.   Psychiatric/Behavioral: Negative.   All other systems reviewed and are negative.  Objective     Vitals:    01/26/17 0850   BP: 136/87   Pulse: 78   Resp: 16   Temp: 96.7 °F (35.9 °C)   TempSrc: Oral   SpO2: 99%  Comment: room air   Weight: 146 lb (66.2 kg)   Height: 66\" (167.6 cm)     No flowsheet data found.    Physical Exam    Constitutional: She is oriented to person, place, and time. Vital signs are normal. She appears well-developed.   HENT:   Head: Normocephalic and atraumatic.   Neck: Normal range of motion. Neck supple.   Cardiovascular: Normal rate, regular rhythm, normal heart sounds and intact distal pulses.   No murmur heard.  Pulmonary/Chest: Effort normal and breath sounds normal. She has no wheezes. She has no rhonchi. She has no rales. She exhibits no tenderness.   Abdominal: Soft. There is no tenderness.   Musculoskeletal: Normal range of motion. She exhibits no edema or tenderness.   Neurological: She is alert and oriented to person, place, and time. She has normal strength.   Skin: Skin is warm and dry. No rash noted. No cyanosis or erythema.   Psychiatric: She has a normal mood and affect. Her behavior is normal.   RECENT LABS:  Hematology WBC   Date Value Ref Range Status   10/12/2015 4.51 4.50 - 10.70 K/Cumm Final     RBC   Date Value Ref Range Status   10/12/2015 4.54 3.90 - 5.20 Million Final     HEMOGLOBIN   Date Value Ref Range Status   10/12/2015 12.5 11.9 - 15.5 g/dL Final     HEMATOCRIT   Date " Value Ref Range Status   10/12/2015 39.0 35.6 - 45.5 % Final     PLATELETS   Date Value Ref Range Status   10/12/2015 224 140 - 500 K/Cumm Final      CT scan of the chest was independently reviewed. There is a 2 cm mass in the right upper lobe of the lung. This is spiculated and has all the characteristics of a primary lung cancer. There is a 3 mm noncalcified nodule in the left lower lobe. There are no hilar or mediastinal lymph nodes. There is a 1.2 cm left thyroid nodule. There is a pectus deformity of the chest wall. There is a 1.7 cm cyst of the upper pole of the left kidney. No other abnormalities were seen on x-ray    Assessment/Plan        71-year-old female with a past history of reflux, arthritis and thyroid disease as well as tobacco use for 20 years stopping in the late 1980s.  Exams have now revealed a right upper lobe apparent stage I non-small cell lung cancer.  Additional staging has revealed potential abnormalities in the nasopharynx as well.  The patient is a candidate for surgery after seeing her today with Dr. Yuri Brizuela in pulmonary clinic and plans are made to proceed and chest that fashion.  She will, however, need ENT assessment initially and she is to see Dr. Duran of ENT initially.  I met with the patient did discuss potential  therapy if indeed additional findings are found per surgery or in ENT exams. Thus far no additional adjuvant therapy is felt likely. We will remain availbale as needed.      The every 4 hours to see this patient consultation and please let me know if every further questions concerning this case.

## 2017-01-26 NOTE — LETTER
January 26, 2017     Felisha Baez MD  [address]  [place] [id number]    Patient: Jerilyn Martinez   YOB: 1945   Date of Visit: 1/26/2017       Dear Dr. Sasha MD:    Thank you for referring Jerilyn Martinez to me for evaluation. Below are the relevant portions of my assessment and plan of care.    If you have questions, please do not hesitate to call me. I look forward to following Jerilyn along with you.         Sincerely,        Yuri Briuzela III, MD        CC: MD Yuri Storey III, MD  1/26/2017  6:11 PM  Sign at close encounter  Subjective   Patient ID: Jerilyn Martinez is a 71 y.o. female is here today for follow-up.    History of Present Illness    Jerilyn Martinez was seen in the lung care center at [facility] today January 26, 2017 as part of our multidisciplinary lung cancer clinic.  Since her initial evaluation she has undergone a CT directed needle biopsy of the right upper lobe lung mass.  She has also had a CT PET scan and pulmonary function tests.  Her case was discussed in our multidisciplinary thoracic oncology conference this morning.  She is here today to discuss the test results and treatment options.    The needle biopsy went without problems.  She experienced no pleuritic pain or shortness of breath after the biopsy.  She has had no hemoptysis.    The following portions of the patient's history were reviewed and updated as appropriate: allergies, current medications, past family history, past medical history, past social history, past surgical history and problem list.  Review of Systems   Constitution: Negative.   HENT: Negative.    Eyes: Negative.    Cardiovascular: Negative.    Respiratory: Positive for cough.    Endocrine: Negative.    Hematologic/Lymphatic: Negative.    Skin: Negative.    Musculoskeletal: Positive for back pain, joint pain, joint swelling and stiffness.   Gastrointestinal: Positive for heartburn.   Genitourinary:  Negative.    Neurological: Negative.    Psychiatric/Behavioral: Negative.    All other systems reviewed and are negative.    Patient Active Problem List   Diagnosis   • Gastroesophageal reflux disease   • Iron deficiency anemia   • Long term current use of non-steroidal anti-inflammatories (NSAID)   • Mass of right lung   • COPD (chronic obstructive pulmonary disease)   • Malignant neoplasm of upper lobe of right lung     Past Medical History   Diagnosis Date   • Arthritis    • Disease of thyroid gland    • GERD (gastroesophageal reflux disease)    • Lung cancer      Past Surgical History   Procedure Laterality Date   • Bunionectomy     • Thyroid surgery       nodule removed   • Tonsillectomy and adenoidectomy     • Appendectomy     • Hysterectomy     • Lasik       Family History   Problem Relation Age of Onset   • Lung cancer Mother    • Lung cancer Father    • Lung cancer Sister      Social History     Social History   • Marital status: Single     Spouse name: N/A   • Number of children: N/A   • Years of education: N/A     Occupational History   • Not on file.     Social History Main Topics   • Smoking status: Former Smoker     Packs/day: 2.00     Years: 20.00     Types: Cigarettes     Quit date: 1987   • Smokeless tobacco: Not on file   • Alcohol use Yes      Comment: socially   • Drug use: Not on file   • Sexual activity: Not on file     Other Topics Concern   • Not on file     Social History Narrative       Current Outpatient Prescriptions:   •  aspirin 81 MG EC tablet, Take 81 mg by mouth., Disp: , Rfl:   •  Calcium Carbonate-Vitamin D 600-200 MG-UNIT tablet, Take  by mouth., Disp: , Rfl:   •  Cholecalciferol 1000 UNITS capsule, Take  by mouth., Disp: , Rfl:   •  cyanocobalamin 100 MCG tablet, Take  by mouth., Disp: , Rfl:   •  estradiol (ESTRACE) 0.5 MG tablet, Take 0.5 mg by mouth Daily., Disp: , Rfl:   •  levothyroxine (SYNTHROID, LEVOTHROID) 75 MCG tablet, , Disp: , Rfl:   •  meloxicam (MOBIC) 15 MG  tablet, Take 15 mg by mouth Daily As Needed., Disp: , Rfl:   •  Multiple Vitamin (MULTIVITAMIN) capsule, Take 1 capsule by mouth., Disp: , Rfl:   •  Multiple Vitamins-Minerals (EMERGEN-C VITAMIN C) pack, Take 1 package by mouth Daily As Needed., Disp: , Rfl:   •  Omega-3 Fatty Acids (FISH OIL) 1200 MG capsule capsule, Take  by mouth., Disp: , Rfl:   •  omeprazole (priLOSEC) 20 MG capsule, , Disp: , Rfl:   •  simvastatin (ZOCOR) 40 MG tablet, Take 20 mg by mouth Daily., Disp: , Rfl:   Allergies   Allergen Reactions   • Codeine Shortness Of Breath        Objective   Vitals:    01/26/17 0756   BP: 136/87   Pulse: 78   Resp: 16   Temp: 96.7 °F (35.9 °C)   SpO2: 99%     Physical Exam   Constitutional: She is oriented to person, place, and time. Vital signs are normal. She appears well-developed.   HENT:   Head: Normocephalic and atraumatic.   Neck: Normal range of motion. Neck supple.   Cardiovascular: Normal rate, regular rhythm, normal heart sounds and intact distal pulses.    No murmur heard.  Pulmonary/Chest: Effort normal and breath sounds normal. She has no wheezes. She has no rhonchi. She has no rales. She exhibits no tenderness.   Abdominal: Soft. There is no tenderness.   Musculoskeletal: Normal range of motion. She exhibits no edema or tenderness.   Neurological: She is alert and oriented to person, place, and time. She has normal strength.   Skin: Skin is warm and dry. No rash noted. No cyanosis or erythema.   Psychiatric: She has a normal mood and affect. Her behavior is normal.       Assessment/Plan   Independent Review of Radiographic Studies:    CT PET scan was independently reviewed.  The 2 cm mass in the right upper lobe of the lung has a standard uptake value of 15.0.  There is no uptake in the hilum or mediastinum.  Neck chest abdomen and pelvis showed no uptake.  There was some uptake in the nasopharynx but no specific mass was identified.    CT directed needle biopsy was reviewed.  Pathology shows  adenocarcinoma primary lung.    Pulmonary function tests were also reviewed.  The FVC was 2.32 which is 71% of predicted.  The FEV1 is 1.74 which is 71% predicted.  FEV1 FVC ratio is 75.  The diffusion capacity DLCO is 16.3 which is 89% of predicted.    Discussion: This lady has a stage I carcinoma of the right upper lobe of the lung.  We have recommended that she have a right upper lobectomy.  Prior to that surgery she will be evaluated by ENT for the finding on PET Scan  in the nasopharynx.    I have discussed with her and her family robot-assisted right upper lobectomy.  I've explained the procedure as well as the risks and benefits.  I have answered all of her questions.  The patient has requested that we proceed.  I will keep you informed of her progress.      Diagnoses and all orders for this visit:    Malignant neoplasm of upper lobe of right lung  -     Case request  -     Ambulatory Referral to ENT (Otolaryngology)

## 2017-01-26 NOTE — MR AVS SNAPSHOT
Jerilyn Martinez   2017 9:00 AM   Office Visit    Dept Phone:  [phone]   Encounter #:  [id number]    Provider:  Connor Oropeza MD   Department:  [facility] MULTI-DISCIPLINARY CLINIC                Your Full Care Plan              Your Updated Medication List          This list is accurate as of: 17 10:01 AM.  Always use your most recent med list.                aspirin 81 MG EC tablet       Calcium Carbonate-Vitamin D 600-200 MG-UNIT tablet       Cholecalciferol 1000 UNITS capsule       cyanocobalamin 100 MCG tablet       EMERGEN-C VITAMIN C pack       estradiol 0.5 MG tablet   Commonly known as:  ESTRACE       fish oil 1200 MG capsule capsule       levothyroxine 75 MCG tablet   Commonly known as:  SYNTHROID, LEVOTHROID       MOBIC 15 MG tablet   Generic drug:  meloxicam       multivitamin capsule       omeprazole 20 MG capsule   Commonly known as:  priLOSEC       simvastatin 40 MG tablet   Commonly known as:  ZOCOR               Instructions     None    Patient Instructions History      Upcoming Appointments     Visit Type Date Time Department    LUNG CARE VISIT 2017  8:00 AM  [facility]    LUNG CARE VISIT 2017  9:00 AM [facility] MULTI DISC CLIN      MyChart Signup     [facility] [employer]ISHI allows you to send messages to your doctor, view your test results, renew your prescriptions, schedule appointments, and more. To sign up, go to Nautal and click on the Sign Up Now link in the New User? box. Enter your WISHI Activation Code exactly as it appears below along with the last four digits of your Social Security Number and your Date of Birth () to complete the sign-up process. If you do not sign up before the expiration date, you must request a new code.    WISHI Activation Code: [email or web address]  Expires: 2017 10:01 AM    If you have questions, you can email [email or web address] or call [phone] to  "talk to our MyChart staff. Remember, MyChart is NOT to be used for urgent needs. For medical emergencies, dial 911.               Other Info from Your Visit           Allergies     Codeine  Shortness Of Breath      Reason for Visit     Lung Cancer           Vital Signs     Blood Pressure Pulse Temperature Respirations Height Weight    136/87 78 96.7 °F (35.9 °C) (Oral) 16 66\" (167.6 cm) 146 lb (66.2 kg)    Oxygen Saturation Body Mass Index Smoking Status             99% 23.57 kg/m2 Former Smoker           "

## 2017-01-27 NOTE — PROGRESS NOTES
Pt seen by Dr. Brizuela and will be scheduled with ENT for evaluation of hypermetabolic activity in the nasopharynx. Pt will be scheduled for a right upper lobectomy. Pt given information regarding surgery.

## 2017-01-31 ENCOUNTER — RESULTS ENCOUNTER (OUTPATIENT)
Dept: SURGERY | Facility: CLINIC | Age: 72
End: 2017-01-31

## 2017-01-31 DIAGNOSIS — C34.11 MALIGNANT NEOPLASM OF UPPER LOBE OF RIGHT LUNG (HCC): ICD-10-CM

## 2017-01-31 DIAGNOSIS — R79.1 ABNORMAL COAGULATION PROFILE: ICD-10-CM

## 2017-01-31 LAB
CYTO UR: NORMAL
LAB AP CASE REPORT: NORMAL
Lab: NORMAL
Lab: NORMAL
PATH REPORT.ADDENDUM SPEC: NORMAL
PATH REPORT.FINAL DX SPEC: NORMAL
PATH REPORT.GROSS SPEC: NORMAL

## 2017-02-06 ENCOUNTER — TELEPHONE (OUTPATIENT)
Dept: SURGERY | Facility: CLINIC | Age: 72
End: 2017-02-06

## 2017-02-06 NOTE — TELEPHONE ENCOUNTER
----- Message from Yuri Brizuela III, MD sent at 2/6/2017 10:39 AM EST -----  He has been cleared by Dr. Duran to proceed on with her right upper lobectomy  ----- Message -----     From: Samanta Vigil     Sent: 2/6/2017  10:08 AM       To: MD Dr. Chata Cueto III, Donna Stephens saw Dr. Erwin Duran on 2-1-17 for the nasopharyngeal lesion that was found on her PET scan.  His office note is scanned into the Media Tab.    She is scheduled for your surgery on 02/17/17.  Right VATs w/Robot directed Right upper Lobectomy.    Samanta

## 2017-02-10 ENCOUNTER — RESEARCH ENCOUNTER (OUTPATIENT)
Dept: OTHER | Facility: OTHER | Age: 72
End: 2017-02-10

## 2017-02-10 ENCOUNTER — APPOINTMENT (OUTPATIENT)
Dept: PREADMISSION TESTING | Facility: HOSPITAL | Age: 72
End: 2017-02-10

## 2017-02-10 VITALS
TEMPERATURE: 96.6 F | OXYGEN SATURATION: 100 % | WEIGHT: 151.3 LBS | HEIGHT: 66 IN | HEART RATE: 79 BPM | RESPIRATION RATE: 18 BRPM | DIASTOLIC BLOOD PRESSURE: 87 MMHG | SYSTOLIC BLOOD PRESSURE: 137 MMHG | BODY MASS INDEX: 24.32 KG/M2

## 2017-02-10 DIAGNOSIS — C34.11 MALIGNANT NEOPLASM OF UPPER LOBE OF RIGHT LUNG (HCC): ICD-10-CM

## 2017-02-10 LAB
ABO GROUP BLD: NORMAL
ANION GAP SERPL CALCULATED.3IONS-SCNC: 11.2 MMOL/L
APTT PPP: 26.3 SECONDS (ref 22.7–35.4)
BASOPHILS # BLD AUTO: 0.03 10*3/MM3 (ref 0–0.2)
BASOPHILS NFR BLD AUTO: 0.5 % (ref 0–1.5)
BILIRUB UR QL STRIP: NEGATIVE
BLD GP AB SCN SERPL QL: NEGATIVE
BUN BLD-MCNC: 15 MG/DL (ref 8–23)
BUN/CREAT SERPL: 21.7 (ref 7–25)
CALCIUM SPEC-SCNC: 9.9 MG/DL (ref 8.6–10.5)
CHLORIDE SERPL-SCNC: 101 MMOL/L (ref 98–107)
CLARITY UR: CLEAR
CO2 SERPL-SCNC: 27.8 MMOL/L (ref 22–29)
COLOR UR: YELLOW
CREAT BLD-MCNC: 0.69 MG/DL (ref 0.57–1)
DEPRECATED RDW RBC AUTO: 45.1 FL (ref 37–54)
EOSINOPHIL # BLD AUTO: 0.16 10*3/MM3 (ref 0–0.7)
EOSINOPHIL NFR BLD AUTO: 2.6 % (ref 0.3–6.2)
ERYTHROCYTE [DISTWIDTH] IN BLOOD BY AUTOMATED COUNT: 14.6 % (ref 11.7–13)
GFR SERPL CREATININE-BSD FRML MDRD: 84 ML/MIN/1.73
GLUCOSE BLD-MCNC: 79 MG/DL (ref 65–99)
GLUCOSE UR STRIP-MCNC: NEGATIVE MG/DL
HCT VFR BLD AUTO: 39.3 % (ref 35.6–45.5)
HGB BLD-MCNC: 12.6 G/DL (ref 11.9–15.5)
HGB UR QL STRIP.AUTO: NEGATIVE
IMM GRANULOCYTES # BLD: 0 10*3/MM3 (ref 0–0.03)
IMM GRANULOCYTES NFR BLD: 0 % (ref 0–0.5)
INR PPP: 0.97 (ref 0.9–1.1)
KETONES UR QL STRIP: NEGATIVE
LEUKOCYTE ESTERASE UR QL STRIP.AUTO: NEGATIVE
LYMPHOCYTES # BLD AUTO: 1.79 10*3/MM3 (ref 0.9–4.8)
LYMPHOCYTES NFR BLD AUTO: 29.4 % (ref 19.6–45.3)
MCH RBC QN AUTO: 27 PG (ref 26.9–32)
MCHC RBC AUTO-ENTMCNC: 32.1 G/DL (ref 32.4–36.3)
MCV RBC AUTO: 84.2 FL (ref 80.5–98.2)
MONOCYTES # BLD AUTO: 0.41 10*3/MM3 (ref 0.2–1.2)
MONOCYTES NFR BLD AUTO: 6.7 % (ref 5–12)
NEUTROPHILS # BLD AUTO: 3.7 10*3/MM3 (ref 1.9–8.1)
NEUTROPHILS NFR BLD AUTO: 60.8 % (ref 42.7–76)
NITRITE UR QL STRIP: NEGATIVE
PH UR STRIP.AUTO: 7 [PH] (ref 5–8)
PLATELET # BLD AUTO: 238 10*3/MM3 (ref 140–500)
PMV BLD AUTO: 11 FL (ref 6–12)
POTASSIUM BLD-SCNC: 4.5 MMOL/L (ref 3.5–5.2)
PROT UR QL STRIP: NEGATIVE
PROTHROMBIN TIME: 12.5 SECONDS (ref 11.7–14.2)
RBC # BLD AUTO: 4.67 10*6/MM3 (ref 3.9–5.2)
RH BLD: NEGATIVE
SODIUM BLD-SCNC: 140 MMOL/L (ref 136–145)
SP GR UR STRIP: <=1.005 (ref 1–1.03)
UROBILINOGEN UR QL STRIP: NORMAL
WBC NRBC COR # BLD: 6.09 10*3/MM3 (ref 4.5–10.7)

## 2017-02-10 PROCEDURE — 85025 COMPLETE CBC W/AUTO DIFF WBC: CPT | Performed by: THORACIC SURGERY (CARDIOTHORACIC VASCULAR SURGERY)

## 2017-02-10 PROCEDURE — 80048 BASIC METABOLIC PNL TOTAL CA: CPT | Performed by: THORACIC SURGERY (CARDIOTHORACIC VASCULAR SURGERY)

## 2017-02-10 PROCEDURE — 86901 BLOOD TYPING SEROLOGIC RH(D): CPT

## 2017-02-10 PROCEDURE — 85610 PROTHROMBIN TIME: CPT | Performed by: THORACIC SURGERY (CARDIOTHORACIC VASCULAR SURGERY)

## 2017-02-10 PROCEDURE — 86900 BLOOD TYPING SEROLOGIC ABO: CPT

## 2017-02-10 PROCEDURE — 85730 THROMBOPLASTIN TIME PARTIAL: CPT | Performed by: THORACIC SURGERY (CARDIOTHORACIC VASCULAR SURGERY)

## 2017-02-10 PROCEDURE — 86850 RBC ANTIBODY SCREEN: CPT

## 2017-02-10 PROCEDURE — 93010 ELECTROCARDIOGRAM REPORT: CPT | Performed by: INTERNAL MEDICINE

## 2017-02-10 PROCEDURE — 81003 URINALYSIS AUTO W/O SCOPE: CPT | Performed by: THORACIC SURGERY (CARDIOTHORACIC VASCULAR SURGERY)

## 2017-02-10 PROCEDURE — 36415 COLL VENOUS BLD VENIPUNCTURE: CPT | Performed by: THORACIC SURGERY (CARDIOTHORACIC VASCULAR SURGERY)

## 2017-02-10 PROCEDURE — 93005 ELECTROCARDIOGRAM TRACING: CPT

## 2017-02-10 RX ORDER — ACETAMINOPHEN,DIPHENHYDRAMINE HCL 500; 25 MG/1; MG/1
1 TABLET, FILM COATED ORAL NIGHTLY PRN
COMMUNITY
End: 2020-01-16

## 2017-02-10 NOTE — RESEARCH
[facility]  CONSULTING IN BLOOD DISORDERS & CANCER  Yobani Haddad, Scott Aguila Myers, [place],  Nasima, Rachel, Jai, Geoffrey & Lori  [address], [address]  [place] [address]  Phone: [phone]  Fax: [phone]      Patient Name:  Jerilyn Martinez  YOB: 1945  Patient Age:  71 y.o.  Patient's Sex:  female    Date of Service:  02/10/2017    Protocol: [id number]                                        RESEARCH INFORMED CONSENT                                     The consent form was explained and summarized by the investigator and/or coordinator.  The subject was given a copy of the consent form to read and encouraged to take the copy home to share with family and/or significant others.      The subject was given the opportunity to ask questions of the coordinator and the investigator.      The subject has read and verbalized an understanding of the informed consent.  All questions and concerns were addressed.  she signed and was given a copy of the written informed consent prior to performing any study-related procedures.      GEORGINA George RN    02/10/17, 1:01 PM

## 2017-02-10 NOTE — DISCHARGE INSTRUCTIONS
Take the following medications the morning of surgery with a small sip of water.  OMEPRAZOLE    Arrive to hospital on your day of surgery at 5:30 AM    General Instructions:  • Do not eat or drink after midnight: includes water, mints, or gum. You may brush your teeth.  • Do not smoke, chew tobacco, or drink alcohol.  • Bring medications in original bottles, any inhalers and if applicable your C-PAP/ BI-PAP machine.  • Bring any papers given to you in the doctor’s office.  • Wear clean comfortable clothes and socks.  • Do not wear contact lenses or make-up.  Bring a case for your glasses if applicable.   • Bring crutches or walker if applicable.  • Leave all other valuables and jewelry at home.    If you were given a blood bank ID arm band remember to bring it with you the day of surgery.    Preventing a Surgical Site Infection:  Shower on the morning of surgery using a fresh bar of anti-bacterial soap (such as Dial) and clean washcloth.  Dry with a clean towel and dress in clean clothing.  For 2 to 3 days before surgery, avoid shaving with a razor near where you will have surgery because the razor can irritate skin and make it easier to develop an infection  Ask your surgeon if you will be receiving antibiotics prior to surgery  Make sure you, your family, and all healthcare providers clean their hands with soap and water or an alcohol based hand  before caring for you or your wound  If at all possible, quit smoking as many days before surgery as you can.    Day of surgery:  Upon arrival, a Pre-op nurse and Anesthesiologist will review your health history, obtain vital signs, and answer questions you may have.  The only belongings needed at this time will be your home medications and if applicable your C-PAP/BI-PAP machine.  If you are staying overnight your family can leave the rest of your belongings in the car and bring them to your room later.  A Pre-op nurse will start an IV and you may receive  medication in preparation for surgery, including something to help you relax.  Your family will be able to see you in the Pre-op area.  While you are in surgery your family should notify the waiting room  if they leave the waiting room area and provide a contact phone number.    Please be aware that surgery does come with discomfort.  We want to make every effort to control your discomfort so please discuss any uncontrolled symptoms with your nurse.   Your doctor will most likely have prescribed pain medications.      If you are going home after surgery you will receive individualized written care instructions before being discharged.  A responsible adult must drive you to and from the hospital on the day of your surgery and stay with you for 24 hours.    If you are staying overnight following surgery, you will be transported to your hospital room following the recovery period.  [facility] has all private rooms.    If you have any questions please call Pre-Admission Testing at [phone].  Deductibles and co-payments are collected on the day of service. Please be prepared to pay the required co-pay, deductible or deposit on the day of service as defined by your plan.

## 2017-02-17 ENCOUNTER — ANESTHESIA EVENT (OUTPATIENT)
Dept: PERIOP | Facility: HOSPITAL | Age: 72
End: 2017-02-17

## 2017-02-17 ENCOUNTER — APPOINTMENT (OUTPATIENT)
Dept: GENERAL RADIOLOGY | Facility: HOSPITAL | Age: 72
End: 2017-02-17

## 2017-02-17 ENCOUNTER — HOSPITAL ENCOUNTER (INPATIENT)
Facility: HOSPITAL | Age: 72
LOS: 6 days | Discharge: HOME | End: 2017-02-23
Attending: THORACIC SURGERY (CARDIOTHORACIC VASCULAR SURGERY) | Admitting: THORACIC SURGERY (CARDIOTHORACIC VASCULAR SURGERY)

## 2017-02-17 ENCOUNTER — ANESTHESIA (OUTPATIENT)
Dept: PERIOP | Facility: HOSPITAL | Age: 72
End: 2017-02-17

## 2017-02-17 DIAGNOSIS — C34.90 ADENOCARCINOMA OF LUNG (HCC): ICD-10-CM

## 2017-02-17 DIAGNOSIS — M62.81 MUSCLE WEAKNESS (GENERALIZED): Primary | ICD-10-CM

## 2017-02-17 DIAGNOSIS — C34.11 MALIGNANT NEOPLASM OF UPPER LOBE OF RIGHT LUNG (HCC): ICD-10-CM

## 2017-02-17 PROBLEM — R91.8 MASS OF RIGHT LUNG: Status: RESOLVED | Noted: 2017-01-12 | Resolved: 2017-02-17

## 2017-02-17 PROCEDURE — 88309 TISSUE EXAM BY PATHOLOGIST: CPT | Performed by: THORACIC SURGERY (CARDIOTHORACIC VASCULAR SURGERY)

## 2017-02-17 PROCEDURE — 3E0T3BZ INTRODUCTION OF ANESTHETIC AGENT INTO PERIPHERAL NERVES AND PLEXI, PERCUTANEOUS APPROACH: ICD-10-PCS | Performed by: THORACIC SURGERY (CARDIOTHORACIC VASCULAR SURGERY)

## 2017-02-17 PROCEDURE — 25010000002 KETOROLAC TROMETHAMINE PER 15 MG: Performed by: THORACIC SURGERY (CARDIOTHORACIC VASCULAR SURGERY)

## 2017-02-17 PROCEDURE — 71010 HC CHEST PA OR AP: CPT

## 2017-02-17 PROCEDURE — 88305 TISSUE EXAM BY PATHOLOGIST: CPT | Performed by: THORACIC SURGERY (CARDIOTHORACIC VASCULAR SURGERY)

## 2017-02-17 PROCEDURE — 88313 SPECIAL STAINS GROUP 2: CPT | Performed by: THORACIC SURGERY (CARDIOTHORACIC VASCULAR SURGERY)

## 2017-02-17 PROCEDURE — 88332 PATH CONSLTJ SURG EA ADD BLK: CPT | Performed by: THORACIC SURGERY (CARDIOTHORACIC VASCULAR SURGERY)

## 2017-02-17 PROCEDURE — 0BTC4ZZ RESECTION OF RIGHT UPPER LUNG LOBE, PERCUTANEOUS ENDOSCOPIC APPROACH: ICD-10-PCS | Performed by: THORACIC SURGERY (CARDIOTHORACIC VASCULAR SURGERY)

## 2017-02-17 PROCEDURE — 25010000003 CEFAZOLIN IN DEXTROSE 2-4 GM/100ML-% SOLUTION: Performed by: THORACIC SURGERY (CARDIOTHORACIC VASCULAR SURGERY)

## 2017-02-17 PROCEDURE — 25010000002 FENTANYL CITRATE (PF) 100 MCG/2ML SOLUTION: Performed by: NURSE ANESTHETIST, CERTIFIED REGISTERED

## 2017-02-17 PROCEDURE — 25010000002 ONDANSETRON PER 1 MG: Performed by: NURSE ANESTHETIST, CERTIFIED REGISTERED

## 2017-02-17 PROCEDURE — 25010000002 PROPOFOL 10 MG/ML EMULSION: Performed by: NURSE ANESTHETIST, CERTIFIED REGISTERED

## 2017-02-17 PROCEDURE — 25010000002 MIDAZOLAM PER 1 MG: Performed by: ANESTHESIOLOGY

## 2017-02-17 PROCEDURE — 25010000002 HYDROMORPHONE PER 4 MG: Performed by: NURSE ANESTHETIST, CERTIFIED REGISTERED

## 2017-02-17 PROCEDURE — 25010000002 DEXAMETHASONE PER 1 MG: Performed by: NURSE ANESTHETIST, CERTIFIED REGISTERED

## 2017-02-17 PROCEDURE — C1729 CATH, DRAINAGE: HCPCS | Performed by: THORACIC SURGERY (CARDIOTHORACIC VASCULAR SURGERY)

## 2017-02-17 PROCEDURE — S0260 H&P FOR SURGERY: HCPCS | Performed by: THORACIC SURGERY (CARDIOTHORACIC VASCULAR SURGERY)

## 2017-02-17 PROCEDURE — 32663 THORACOSCOPY W/LOBECTOMY: CPT | Performed by: THORACIC SURGERY (CARDIOTHORACIC VASCULAR SURGERY)

## 2017-02-17 PROCEDURE — 0BJ08ZZ INSPECTION OF TRACHEOBRONCHIAL TREE, VIA NATURAL OR ARTIFICIAL OPENING ENDOSCOPIC: ICD-10-PCS | Performed by: THORACIC SURGERY (CARDIOTHORACIC VASCULAR SURGERY)

## 2017-02-17 PROCEDURE — 88331 PATH CONSLTJ SURG 1 BLK 1SPC: CPT | Performed by: THORACIC SURGERY (CARDIOTHORACIC VASCULAR SURGERY)

## 2017-02-17 PROCEDURE — 32674 THORACOSCOPY LYMPH NODE EXC: CPT | Performed by: THORACIC SURGERY (CARDIOTHORACIC VASCULAR SURGERY)

## 2017-02-17 PROCEDURE — 88307 TISSUE EXAM BY PATHOLOGIST: CPT | Performed by: THORACIC SURGERY (CARDIOTHORACIC VASCULAR SURGERY)

## 2017-02-17 PROCEDURE — C2618 PROBE/NEEDLE, CRYO: HCPCS | Performed by: THORACIC SURGERY (CARDIOTHORACIC VASCULAR SURGERY)

## 2017-02-17 PROCEDURE — 25010000002 FENTANYL CITRATE (PF) 100 MCG/2ML SOLUTION: Performed by: ANESTHESIOLOGY

## 2017-02-17 PROCEDURE — 25010000002 NEOSTIGMINE 10 MG/10ML SOLUTION: Performed by: NURSE ANESTHETIST, CERTIFIED REGISTERED

## 2017-02-17 PROCEDURE — 25010000002 KETOROLAC TROMETHAMINE PER 15 MG: Performed by: NURSE ANESTHETIST, CERTIFIED REGISTERED

## 2017-02-17 RX ORDER — KETOROLAC TROMETHAMINE 30 MG/ML
15 INJECTION, SOLUTION INTRAMUSCULAR; INTRAVENOUS EVERY 6 HOURS PRN
Status: DISPENSED | OUTPATIENT
Start: 2017-02-17 | End: 2017-02-20

## 2017-02-17 RX ORDER — ONDANSETRON 4 MG/1
4 TABLET, FILM COATED ORAL EVERY 6 HOURS PRN
Status: DISCONTINUED | OUTPATIENT
Start: 2017-02-17 | End: 2017-02-23 | Stop reason: HOSPADM

## 2017-02-17 RX ORDER — HYDROCODONE BITARTRATE AND ACETAMINOPHEN 7.5; 325 MG/1; MG/1
1 TABLET ORAL EVERY 4 HOURS PRN
Status: DISCONTINUED | OUTPATIENT
Start: 2017-02-17 | End: 2017-02-23 | Stop reason: HOSPADM

## 2017-02-17 RX ORDER — CEFAZOLIN SODIUM 2 G/100ML
2 INJECTION, SOLUTION INTRAVENOUS ONCE
Status: COMPLETED | OUTPATIENT
Start: 2017-02-17 | End: 2017-02-17

## 2017-02-17 RX ORDER — ATORVASTATIN CALCIUM 10 MG/1
10 TABLET, FILM COATED ORAL DAILY
Status: DISCONTINUED | OUTPATIENT
Start: 2017-02-17 | End: 2017-02-17

## 2017-02-17 RX ORDER — HYDRALAZINE HYDROCHLORIDE 20 MG/ML
5 INJECTION INTRAMUSCULAR; INTRAVENOUS
Status: DISCONTINUED | OUTPATIENT
Start: 2017-02-17 | End: 2017-02-17 | Stop reason: HOSPADM

## 2017-02-17 RX ORDER — NALOXONE HCL 0.4 MG/ML
0.4 VIAL (ML) INJECTION
Status: DISCONTINUED | OUTPATIENT
Start: 2017-02-17 | End: 2017-02-23 | Stop reason: HOSPADM

## 2017-02-17 RX ORDER — LEVOTHYROXINE SODIUM 0.07 MG/1
75 TABLET ORAL DAILY
Status: DISCONTINUED | OUTPATIENT
Start: 2017-02-18 | End: 2017-02-23 | Stop reason: HOSPADM

## 2017-02-17 RX ORDER — ONDANSETRON 2 MG/ML
INJECTION INTRAMUSCULAR; INTRAVENOUS AS NEEDED
Status: DISCONTINUED | OUTPATIENT
Start: 2017-02-17 | End: 2017-02-17 | Stop reason: SURG

## 2017-02-17 RX ORDER — ONDANSETRON 4 MG/1
4 TABLET, ORALLY DISINTEGRATING ORAL EVERY 6 HOURS PRN
Status: DISCONTINUED | OUTPATIENT
Start: 2017-02-17 | End: 2017-02-23 | Stop reason: HOSPADM

## 2017-02-17 RX ORDER — HYDROMORPHONE HYDROCHLORIDE 1 MG/ML
0.5 INJECTION, SOLUTION INTRAMUSCULAR; INTRAVENOUS; SUBCUTANEOUS
Status: DISCONTINUED | OUTPATIENT
Start: 2017-02-17 | End: 2017-02-17 | Stop reason: HOSPADM

## 2017-02-17 RX ORDER — PROMETHAZINE HYDROCHLORIDE 25 MG/ML
12.5 INJECTION, SOLUTION INTRAMUSCULAR; INTRAVENOUS ONCE AS NEEDED
Status: DISCONTINUED | OUTPATIENT
Start: 2017-02-17 | End: 2017-02-17 | Stop reason: HOSPADM

## 2017-02-17 RX ORDER — LEVOTHYROXINE SODIUM 0.07 MG/1
75 TABLET ORAL DAILY
Status: DISCONTINUED | OUTPATIENT
Start: 2017-02-17 | End: 2017-02-17

## 2017-02-17 RX ORDER — MULTIVIT AND MINERALS-FERROUS GLUCONATE 9 MG IRON/15 ML ORAL LIQUID 9 MG/15 ML
15 LIQUID (ML) ORAL DAILY
Status: DISCONTINUED | OUTPATIENT
Start: 2017-02-17 | End: 2017-02-18

## 2017-02-17 RX ORDER — SODIUM CHLORIDE 0.9 % (FLUSH) 0.9 %
1-10 SYRINGE (ML) INJECTION AS NEEDED
Status: DISCONTINUED | OUTPATIENT
Start: 2017-02-17 | End: 2017-02-23 | Stop reason: HOSPADM

## 2017-02-17 RX ORDER — POLYETHYLENE GLYCOL 3350 17 G/17G
17 POWDER, FOR SOLUTION ORAL DAILY
Status: DISCONTINUED | OUTPATIENT
Start: 2017-02-17 | End: 2017-02-23 | Stop reason: HOSPADM

## 2017-02-17 RX ORDER — HEPARIN SODIUM 5000 [USP'U]/ML
5000 INJECTION, SOLUTION INTRAVENOUS; SUBCUTANEOUS EVERY 8 HOURS SCHEDULED
Status: DISCONTINUED | OUTPATIENT
Start: 2017-02-18 | End: 2017-02-23 | Stop reason: HOSPADM

## 2017-02-17 RX ORDER — CALCIUM CARBONATE/VITAMIN D3 600 MG-10
1200 TABLET ORAL DAILY
Status: DISCONTINUED | OUTPATIENT
Start: 2017-02-17 | End: 2017-02-23 | Stop reason: HOSPADM

## 2017-02-17 RX ORDER — PROMETHAZINE HYDROCHLORIDE 25 MG/1
25 TABLET ORAL ONCE AS NEEDED
Status: DISCONTINUED | OUTPATIENT
Start: 2017-02-17 | End: 2017-02-17 | Stop reason: HOSPADM

## 2017-02-17 RX ORDER — NALOXONE HCL 0.4 MG/ML
0.2 VIAL (ML) INJECTION AS NEEDED
Status: DISCONTINUED | OUTPATIENT
Start: 2017-02-17 | End: 2017-02-17 | Stop reason: HOSPADM

## 2017-02-17 RX ORDER — ESTRADIOL 0.5 MG/1
0.5 TABLET ORAL DAILY
Status: DISCONTINUED | OUTPATIENT
Start: 2017-02-17 | End: 2017-02-17

## 2017-02-17 RX ORDER — HYDROMORPHONE HYDROCHLORIDE 1 MG/ML
0.25 INJECTION, SOLUTION INTRAMUSCULAR; INTRAVENOUS; SUBCUTANEOUS
Status: DISCONTINUED | OUTPATIENT
Start: 2017-02-17 | End: 2017-02-17 | Stop reason: HOSPADM

## 2017-02-17 RX ORDER — SODIUM CHLORIDE 0.9 % (FLUSH) 0.9 %
1-10 SYRINGE (ML) INJECTION AS NEEDED
Status: DISCONTINUED | OUTPATIENT
Start: 2017-02-17 | End: 2017-02-17 | Stop reason: HOSPADM

## 2017-02-17 RX ORDER — FENTANYL CITRATE 50 UG/ML
50 INJECTION, SOLUTION INTRAMUSCULAR; INTRAVENOUS
Status: DISCONTINUED | OUTPATIENT
Start: 2017-02-17 | End: 2017-02-17 | Stop reason: HOSPADM

## 2017-02-17 RX ORDER — DOCUSATE SODIUM 100 MG/1
100 CAPSULE, LIQUID FILLED ORAL 2 TIMES DAILY
Status: DISCONTINUED | OUTPATIENT
Start: 2017-02-17 | End: 2017-02-23 | Stop reason: HOSPADM

## 2017-02-17 RX ORDER — PROPOFOL 10 MG/ML
VIAL (ML) INTRAVENOUS AS NEEDED
Status: DISCONTINUED | OUTPATIENT
Start: 2017-02-17 | End: 2017-02-17 | Stop reason: SURG

## 2017-02-17 RX ORDER — ACETAMINOPHEN 325 MG/1
650 TABLET ORAL EVERY 4 HOURS PRN
Status: DISCONTINUED | OUTPATIENT
Start: 2017-02-17 | End: 2017-02-23 | Stop reason: HOSPADM

## 2017-02-17 RX ORDER — HYDROMORPHONE HCL 110MG/55ML
PATIENT CONTROLLED ANALGESIA SYRINGE INTRAVENOUS AS NEEDED
Status: DISCONTINUED | OUTPATIENT
Start: 2017-02-17 | End: 2017-02-17 | Stop reason: SURG

## 2017-02-17 RX ORDER — UBIDECARENONE 75 MG
100 CAPSULE ORAL DAILY
Status: DISCONTINUED | OUTPATIENT
Start: 2017-02-17 | End: 2017-02-23 | Stop reason: HOSPADM

## 2017-02-17 RX ORDER — MIDAZOLAM HYDROCHLORIDE 1 MG/ML
2 INJECTION INTRAMUSCULAR; INTRAVENOUS
Status: DISCONTINUED | OUTPATIENT
Start: 2017-02-17 | End: 2017-02-17 | Stop reason: HOSPADM

## 2017-02-17 RX ORDER — PANTOPRAZOLE SODIUM 40 MG/1
40 TABLET, DELAYED RELEASE ORAL
Status: DISCONTINUED | OUTPATIENT
Start: 2017-02-18 | End: 2017-02-17

## 2017-02-17 RX ORDER — NITROGLYCERIN 0.4 MG/1
0.4 TABLET SUBLINGUAL
Status: DISCONTINUED | OUTPATIENT
Start: 2017-02-17 | End: 2017-02-23 | Stop reason: HOSPADM

## 2017-02-17 RX ORDER — ASPIRIN 81 MG/1
81 TABLET ORAL DAILY
Status: DISCONTINUED | OUTPATIENT
Start: 2017-02-17 | End: 2017-02-23 | Stop reason: HOSPADM

## 2017-02-17 RX ORDER — BISACODYL 10 MG
10 SUPPOSITORY, RECTAL RECTAL DAILY PRN
Status: DISCONTINUED | OUTPATIENT
Start: 2017-02-17 | End: 2017-02-23 | Stop reason: HOSPADM

## 2017-02-17 RX ORDER — ESTRADIOL 0.5 MG/1
0.5 TABLET ORAL DAILY
Status: DISCONTINUED | OUTPATIENT
Start: 2017-02-18 | End: 2017-02-23 | Stop reason: HOSPADM

## 2017-02-17 RX ORDER — DIPHENHYDRAMINE HYDROCHLORIDE 50 MG/ML
12.5 INJECTION INTRAMUSCULAR; INTRAVENOUS
Status: DISCONTINUED | OUTPATIENT
Start: 2017-02-17 | End: 2017-02-17 | Stop reason: HOSPADM

## 2017-02-17 RX ORDER — SODIUM CHLORIDE, SODIUM LACTATE, POTASSIUM CHLORIDE, CALCIUM CHLORIDE 600; 310; 30; 20 MG/100ML; MG/100ML; MG/100ML; MG/100ML
9 INJECTION, SOLUTION INTRAVENOUS CONTINUOUS
Status: DISCONTINUED | OUTPATIENT
Start: 2017-02-17 | End: 2017-02-23 | Stop reason: HOSPADM

## 2017-02-17 RX ORDER — SENNA AND DOCUSATE SODIUM 50; 8.6 MG/1; MG/1
2 TABLET, FILM COATED ORAL NIGHTLY
Status: DISCONTINUED | OUTPATIENT
Start: 2017-02-17 | End: 2017-02-23 | Stop reason: HOSPADM

## 2017-02-17 RX ORDER — LIDOCAINE HYDROCHLORIDE 20 MG/ML
INJECTION, SOLUTION INFILTRATION; PERINEURAL AS NEEDED
Status: DISCONTINUED | OUTPATIENT
Start: 2017-02-17 | End: 2017-02-17 | Stop reason: SURG

## 2017-02-17 RX ORDER — FAMOTIDINE 10 MG/ML
20 INJECTION, SOLUTION INTRAVENOUS ONCE
Status: COMPLETED | OUTPATIENT
Start: 2017-02-17 | End: 2017-02-17

## 2017-02-17 RX ORDER — SODIUM CHLORIDE 9 MG/ML
INJECTION, SOLUTION INTRAVENOUS AS NEEDED
Status: DISCONTINUED | OUTPATIENT
Start: 2017-02-17 | End: 2017-02-17 | Stop reason: HOSPADM

## 2017-02-17 RX ORDER — GLYCOPYRROLATE 0.2 MG/ML
INJECTION INTRAMUSCULAR; INTRAVENOUS AS NEEDED
Status: DISCONTINUED | OUTPATIENT
Start: 2017-02-17 | End: 2017-02-17 | Stop reason: SURG

## 2017-02-17 RX ORDER — DEXAMETHASONE SODIUM PHOSPHATE 10 MG/ML
INJECTION INTRAMUSCULAR; INTRAVENOUS AS NEEDED
Status: DISCONTINUED | OUTPATIENT
Start: 2017-02-17 | End: 2017-02-17 | Stop reason: SURG

## 2017-02-17 RX ORDER — SODIUM CHLORIDE 9 MG/ML
75 INJECTION, SOLUTION INTRAVENOUS CONTINUOUS
Status: DISCONTINUED | OUTPATIENT
Start: 2017-02-17 | End: 2017-02-18

## 2017-02-17 RX ORDER — ONDANSETRON 2 MG/ML
4 INJECTION INTRAMUSCULAR; INTRAVENOUS EVERY 6 HOURS PRN
Status: DISCONTINUED | OUTPATIENT
Start: 2017-02-17 | End: 2017-02-23 | Stop reason: HOSPADM

## 2017-02-17 RX ORDER — ONDANSETRON 2 MG/ML
4 INJECTION INTRAMUSCULAR; INTRAVENOUS ONCE AS NEEDED
Status: DISCONTINUED | OUTPATIENT
Start: 2017-02-17 | End: 2017-02-17 | Stop reason: HOSPADM

## 2017-02-17 RX ORDER — SIMVASTATIN 20 MG
20 TABLET ORAL NIGHTLY
Status: DISCONTINUED | OUTPATIENT
Start: 2017-02-17 | End: 2017-02-23 | Stop reason: HOSPADM

## 2017-02-17 RX ORDER — FLUMAZENIL 0.1 MG/ML
0.2 INJECTION INTRAVENOUS AS NEEDED
Status: DISCONTINUED | OUTPATIENT
Start: 2017-02-17 | End: 2017-02-17 | Stop reason: HOSPADM

## 2017-02-17 RX ORDER — OMEPRAZOLE 20 MG/1
20 CAPSULE, DELAYED RELEASE ORAL
Status: DISCONTINUED | OUTPATIENT
Start: 2017-02-18 | End: 2017-02-23 | Stop reason: HOSPADM

## 2017-02-17 RX ORDER — MIDAZOLAM HYDROCHLORIDE 1 MG/ML
1 INJECTION INTRAMUSCULAR; INTRAVENOUS
Status: DISCONTINUED | OUTPATIENT
Start: 2017-02-17 | End: 2017-02-17 | Stop reason: HOSPADM

## 2017-02-17 RX ORDER — DIPHENOXYLATE HYDROCHLORIDE AND ATROPINE SULFATE 2.5; .025 MG/1; MG/1
1 TABLET ORAL DAILY
Status: DISCONTINUED | OUTPATIENT
Start: 2017-02-17 | End: 2017-02-23 | Stop reason: HOSPADM

## 2017-02-17 RX ORDER — NEOSTIGMINE METHYLSULFATE 1 MG/ML
INJECTION, SOLUTION INTRAVENOUS AS NEEDED
Status: DISCONTINUED | OUTPATIENT
Start: 2017-02-17 | End: 2017-02-17 | Stop reason: SURG

## 2017-02-17 RX ORDER — BUPIVACAINE HYDROCHLORIDE 2.5 MG/ML
INJECTION, SOLUTION EPIDURAL; INFILTRATION; INTRACAUDAL AS NEEDED
Status: DISCONTINUED | OUTPATIENT
Start: 2017-02-17 | End: 2017-02-17 | Stop reason: HOSPADM

## 2017-02-17 RX ORDER — PROMETHAZINE HYDROCHLORIDE 25 MG/1
25 SUPPOSITORY RECTAL ONCE AS NEEDED
Status: DISCONTINUED | OUTPATIENT
Start: 2017-02-17 | End: 2017-02-17 | Stop reason: HOSPADM

## 2017-02-17 RX ORDER — FENTANYL CITRATE 50 UG/ML
INJECTION, SOLUTION INTRAMUSCULAR; INTRAVENOUS AS NEEDED
Status: DISCONTINUED | OUTPATIENT
Start: 2017-02-17 | End: 2017-02-17 | Stop reason: SURG

## 2017-02-17 RX ORDER — IPRATROPIUM BROMIDE AND ALBUTEROL SULFATE 2.5; .5 MG/3ML; MG/3ML
3 SOLUTION RESPIRATORY (INHALATION)
Status: DISPENSED | OUTPATIENT
Start: 2017-02-17 | End: 2017-02-19

## 2017-02-17 RX ORDER — LABETALOL HYDROCHLORIDE 5 MG/ML
5 INJECTION, SOLUTION INTRAVENOUS
Status: DISCONTINUED | OUTPATIENT
Start: 2017-02-17 | End: 2017-02-17 | Stop reason: HOSPADM

## 2017-02-17 RX ORDER — ROCURONIUM BROMIDE 10 MG/ML
INJECTION, SOLUTION INTRAVENOUS AS NEEDED
Status: DISCONTINUED | OUTPATIENT
Start: 2017-02-17 | End: 2017-02-17 | Stop reason: SURG

## 2017-02-17 RX ORDER — KETOROLAC TROMETHAMINE 30 MG/ML
INJECTION, SOLUTION INTRAMUSCULAR; INTRAVENOUS AS NEEDED
Status: DISCONTINUED | OUTPATIENT
Start: 2017-02-17 | End: 2017-02-17 | Stop reason: SURG

## 2017-02-17 RX ORDER — MELOXICAM 15 MG/1
15 TABLET ORAL DAILY PRN
Status: DISCONTINUED | OUTPATIENT
Start: 2017-02-17 | End: 2017-02-23 | Stop reason: HOSPADM

## 2017-02-17 RX ORDER — CEFAZOLIN SODIUM 2 G/100ML
2 INJECTION, SOLUTION INTRAVENOUS EVERY 8 HOURS
Status: COMPLETED | OUTPATIENT
Start: 2017-02-17 | End: 2017-02-18

## 2017-02-17 RX ORDER — ACETAMINOPHEN,DIPHENHYDRAMINE HCL 500; 25 MG/1; MG/1
1 TABLET, FILM COATED ORAL NIGHTLY PRN
Status: DISCONTINUED | OUTPATIENT
Start: 2017-02-17 | End: 2017-02-23 | Stop reason: HOSPADM

## 2017-02-17 RX ADMIN — SODIUM CHLORIDE, POTASSIUM CHLORIDE, SODIUM LACTATE AND CALCIUM CHLORIDE: 600; 310; 30; 20 INJECTION, SOLUTION INTRAVENOUS at 09:45

## 2017-02-17 RX ADMIN — FAMOTIDINE 20 MG: 10 INJECTION, SOLUTION INTRAVENOUS at 06:53

## 2017-02-17 RX ADMIN — HYDROMORPHONE HYDROCHLORIDE 0.5 MG: 2 INJECTION, SOLUTION INTRAMUSCULAR; INTRAVENOUS; SUBCUTANEOUS at 10:09

## 2017-02-17 RX ADMIN — NEOSTIGMINE METHYLSULFATE 2 MG: 1 INJECTION INTRAVENOUS at 10:08

## 2017-02-17 RX ADMIN — POLYETHYLENE GLYCOL 3350 17 G: 17 POWDER, FOR SOLUTION ORAL at 18:08

## 2017-02-17 RX ADMIN — FENTANYL CITRATE 50 MCG: 50 INJECTION INTRAMUSCULAR; INTRAVENOUS at 07:55

## 2017-02-17 RX ADMIN — ROCURONIUM BROMIDE 25 MG: 10 INJECTION INTRAVENOUS at 08:28

## 2017-02-17 RX ADMIN — SODIUM CHLORIDE, POTASSIUM CHLORIDE, SODIUM LACTATE AND CALCIUM CHLORIDE 9 ML/HR: 600; 310; 30; 20 INJECTION, SOLUTION INTRAVENOUS at 06:53

## 2017-02-17 RX ADMIN — ROCURONIUM BROMIDE 20 MG: 10 INJECTION INTRAVENOUS at 09:01

## 2017-02-17 RX ADMIN — FENTANYL CITRATE 50 MCG: 50 INJECTION INTRAMUSCULAR; INTRAVENOUS at 12:43

## 2017-02-17 RX ADMIN — GLYCOPYRROLATE 0.4 MG: 0.2 INJECTION INTRAMUSCULAR; INTRAVENOUS at 10:08

## 2017-02-17 RX ADMIN — KETOROLAC TROMETHAMINE 30 MG: 30 INJECTION, SOLUTION INTRAMUSCULAR; INTRAVENOUS at 09:53

## 2017-02-17 RX ADMIN — HYDROMORPHONE HYDROCHLORIDE 0.5 MG: 1 INJECTION, SOLUTION INTRAMUSCULAR; INTRAVENOUS; SUBCUTANEOUS at 16:00

## 2017-02-17 RX ADMIN — DOCUSATE SODIUM -SENNOSIDES 2 TABLET: 50; 8.6 TABLET, COATED ORAL at 19:54

## 2017-02-17 RX ADMIN — EPHEDRINE SULFATE 10 MG: 50 INJECTION INTRAMUSCULAR; INTRAVENOUS; SUBCUTANEOUS at 08:49

## 2017-02-17 RX ADMIN — MIDAZOLAM 1 MG: 1 INJECTION INTRAMUSCULAR; INTRAVENOUS at 06:53

## 2017-02-17 RX ADMIN — ROCURONIUM BROMIDE 40 MG: 10 INJECTION INTRAVENOUS at 07:50

## 2017-02-17 RX ADMIN — EPHEDRINE SULFATE 10 MG: 50 INJECTION INTRAMUSCULAR; INTRAVENOUS; SUBCUTANEOUS at 09:16

## 2017-02-17 RX ADMIN — HYDROMORPHONE HYDROCHLORIDE 0.5 MG: 2 INJECTION, SOLUTION INTRAMUSCULAR; INTRAVENOUS; SUBCUTANEOUS at 10:14

## 2017-02-17 RX ADMIN — SODIUM CHLORIDE 75 ML/HR: 9 INJECTION, SOLUTION INTRAVENOUS at 16:47

## 2017-02-17 RX ADMIN — FENTANYL CITRATE 100 MCG: 50 INJECTION INTRAMUSCULAR; INTRAVENOUS at 07:50

## 2017-02-17 RX ADMIN — ASPIRIN 81 MG: 81 TABLET ORAL at 18:09

## 2017-02-17 RX ADMIN — CEFAZOLIN SODIUM 2 G: 2 INJECTION, SOLUTION INTRAVENOUS at 07:46

## 2017-02-17 RX ADMIN — LIDOCAINE HYDROCHLORIDE 60 MG: 20 INJECTION, SOLUTION INFILTRATION; PERINEURAL at 07:50

## 2017-02-17 RX ADMIN — CEFAZOLIN SODIUM 2 G: 2 INJECTION, SOLUTION INTRAVENOUS at 18:09

## 2017-02-17 RX ADMIN — ONDANSETRON 4 MG: 2 INJECTION INTRAMUSCULAR; INTRAVENOUS at 09:49

## 2017-02-17 RX ADMIN — KETOROLAC TROMETHAMINE 15 MG: 30 INJECTION, SOLUTION INTRAMUSCULAR at 20:05

## 2017-02-17 RX ADMIN — DOCUSATE SODIUM 100 MG: 100 CAPSULE, LIQUID FILLED ORAL at 18:09

## 2017-02-17 RX ADMIN — SODIUM CHLORIDE 75 ML/HR: 9 INJECTION, SOLUTION INTRAVENOUS at 12:45

## 2017-02-17 RX ADMIN — DEXAMETHASONE SODIUM PHOSPHATE 8 MG: 10 INJECTION INTRAMUSCULAR; INTRAVENOUS at 08:40

## 2017-02-17 RX ADMIN — FENTANYL CITRATE 50 MCG: 50 INJECTION INTRAMUSCULAR; INTRAVENOUS at 07:02

## 2017-02-17 RX ADMIN — FENTANYL CITRATE 50 MCG: 50 INJECTION INTRAMUSCULAR; INTRAVENOUS at 14:38

## 2017-02-17 RX ADMIN — METOPROLOL TARTRATE 25 MG: 25 TABLET ORAL at 19:54

## 2017-02-17 RX ADMIN — PROPOFOL 150 MG: 10 INJECTION, EMULSION INTRAVENOUS at 07:50

## 2017-02-17 NOTE — PLAN OF CARE
Problem: Patient Care Overview (Adult)  Goal: Plan of Care Review  Outcome: Ongoing (interventions implemented as appropriate)    02/17/17 0113   Coping/Psychosocial Response Interventions   Plan Of Care Reviewed With patient   Patient Care Overview   Progress improving   Outcome Evaluation   Outcome Summary/Follow up Plan Pt VSS. CT to -20 suction. No air leak or subq air. Denies nausea and minimal pain. FC, IVF's and A line. Will continue to monitor.         Problem: Perioperative Period (Adult)  Goal: Signs and Symptoms of Listed Potential Problems Will be Absent or Manageable (Perioperative Period)  Outcome: Ongoing (interventions implemented as appropriate)    Problem: Fall Risk (Adult)  Goal: Identify Related Risk Factors and Signs and Symptoms  Outcome: Ongoing (interventions implemented as appropriate)  Goal: Absence of Falls  Outcome: Ongoing (interventions implemented as appropriate)

## 2017-02-17 NOTE — OP NOTE
THORACOSCOPY WITH DAVINCI ROBOT  Procedure Note    Jerilyn Martinez  2/17/2017    Pre-op Diagnosis:   Stage I adenocarcinoma of the lung    Post-op Diagnosis:     Same    Procedure/CPT® Codes:      Procedure(s):  BRONCHOSCOPY, RIGHT VAT, ROBOT ASSISTED RIGHT UPPER LOBECTOMY, INTERCOSTAL NERVE BLOCK WITH CRYOA    Surgeon(s):  Yuri Brizuela III, MD    Anesthesia: General    Staff:   Cell Saver : Esau Grant  Circulator: Meena Younger RN  Scrub Person: Alfredito Montero  Assistant: DARIN Carrizales    Estimated Blood Loss: 200 cc    Specimens:                  ID Type Source Tests Collected by Time Destination   A : r 4 node Tissue Lymph Node TISSUE EXAM Yuri Brizuela III, MD 2/17/2017 0849    B : r 11 node Tissue Lymph Node TISSUE EXAM Yuri Brizuela III, MD 2/17/2017 0901    C : r 10 node Tissue Lymph Node TISSUE EXAM Yuri Brizuela III, MD 2/17/2017 0903    D : right upper lobe Tissue Lung, Right Upper Lobe TISSUE EXAM Yuri Brizuela III, MD 2/17/2017 0933    E : r 7 node Tissue Lymph Node TISSUE EXAM Yuri Brizuela III, MD 2/17/2017 0918          Drains:   Chest Tube 02/17/17 1005 chest tube placed by physician (Active)       Urethral Catheter 02/17/17 0745 16 (Active)           Findings: Bronchial and vascular margins were free of tumor.  Lymph nodes were harvested from R 4, R 11, R 10 and R 7 stations.    Complications: none    Summary of procedure: Mrs. Dorado was brought to the operating room and placed on the operating table in the supine position.  Following the induction of adequate general endotracheal anesthesia the flexible bronchoscope was passed down the endotracheal tube.  Distal trachea and matthew appeared to be normal.  The matthew was sharp and nondisplaced.  Right mainstem bronchus, right upper lobe, right middle lobe, and right lower lobe bronchi showed no endobronchial lesions or mucosal abnormalities.  The scope was then passed down the left main bronchus.  Left upper  lobe and left lower lobe bronchi showed no endobronchial lesions or mucosal abnormalities.  The bronchoscope was then used to position the double-lumen endotracheal tube in the left main bronchus.  Once the endotracheal tube was in good position the patient was turned into the left lateral decubitus position.  All pressure points were padded.  A roll was placed in the left axilla.  The patient was secured to the operating table with 3 inch adhesive tape and Velcro safety strap.  The right chest was prepped and draped in the usual sterile manner.    The right lung was deflated.  A port site was created in the sixth intercostal space anterior axillary line.  CO2 was insufflated through this space.  The thoracoscope was passed into the pleural space.  The visceral and parietal pleura showed no evidence of involvement.  There was no obvious contraindication to proceeding on with robotic lobectomy.  A working port was created in the 10th intercostal space posterior axillary line.  3 other port sites were created in the seventh intercostal space.  The robot was brought into the field and docked to the ports.  Instruments were passed into the pleural space under direct vision.  Once the instruments were in good position I broke scrub and went to the robot console.  My first assistant stayed at the bedside to pass and manipulate instruments.    We began by dividing the inferior pulmonary ligament.  Lymph nodes were harvested from the R4 station.  The fissure between the upper lobe and the superior segment of the lower lobe was completed with a single pass of the echelon 45 with the 4.8 staples.  Lymph nodes were harvested from R 11 and R 10 stations.  A large posterior descending branch of the pulmonary artery was isolated and looped with a vessel loop and then divided with the endovascular stapler.  The right upper lobe bronchus was mobilized.  The bronchus was looped with an umbilical tape.  The echelon 45 with the 4.8  staples was then positioned across the bronchus.  The stapler was partially closed occluding the bronchus.  The lung was inflated.  Middle and lower lobes ventilated easily.  The stapler was then closed and fired dividing the bronchus.  There was one large anterior apical branch of the pulmonary artery which was now mobilized and looped with a vessel loop.  It was then divided with the endovascular stapler.  The large branch of the superior pulmonary vein draining the upper lobe was isolated and looped with a vessel loop and then divided with the endovascular stapler.  The fissure between the upper lobe and the middle lobe was completed with 2 passes of the echelon 45 with the 4.8 staples.  Lymph nodes were harvested from the subcarinal space.  The specimen was placed in a plastic bag.    At this point I scrubbed back into the operative field.  The robot was undocked from the ports.  The specimen was removed through the working port site.  It was sent to pathology for examination.  Frozen section showed bronchial and vascular margins to be free of tumor.  Subcarinal space and hilum were sprayed with Jen.  Platelet rich plasma was sprayed over the hilum.  The cryoprobe was passed into the pleural space.  Intercostal nerve blocks were performed with the cryoprobe probe.  These blocks were performed in the fifth, sixth, seventh, eighth, and ninth interspaces.  A single chest tube was passed through the anterior port site and directed to the apex of the chest.  It was secured to the chest wall with a suture 0 silk.  All port sites were closed in layers with 2-0 Vicryls and the skin with 4-0 Vicryls.  The working port was closed by reapproximating the ribs with a single suture of #2 Vicryls.  The muscles were closed with running 0 Vicryls.  Subcutaneous tissues were closed with running 2-0 Vicryls.  The skin was closed with a running subcuticular 4-0 Vicryls.  Dry sterile dressings were applied.  The patient was  awakened and extubated in the operating room.  She was transported to the recovery room in satisfactory condition having tolerated the procedure well.  Sponge instrument and needle counts were correct ×2 at the end of the procedure.    EMR Dragon/Transcription disclaimer:   Much of this encounter note is an electronic transcription/translation of spoken language to printed text. The electronic translation of spoken language may permit erroneous, or at times, nonsensical words or phrases to be inadvertently transcribed; Although I have reviewed the note for such errors, some may still exist.          Yuri Brizuela III, MD     Date: 2/17/2017  Time: 10:42 AM

## 2017-02-17 NOTE — ANESTHESIA PROCEDURE NOTES
Airway  Urgency: elective    Date/Time: 2/17/2017 7:54 AM  Airway not difficult    General Information and Staff    Patient location during procedure: OR  Anesthesiologist: EDGARDO ROCA  CRNA: JUAN HAIR    Indications and Patient Condition  Indications for airway management: airway protection    Preoxygenated: yes  MILS maintained throughout  Mask difficulty assessment: 1 - vent by mask    Final Airway Details  Final airway type: endotracheal airway      Successful airway: ETT - double lumen left  Cuffed: yes   Successful intubation technique: direct laryngoscopy  Endotracheal tube insertion site: oral  Blade: Sorenson  Blade size: #2  ETT DL size: 35 fr  Cormack-Lehane Classification: grade IIa - partial view of glottis  Placement verified by: chest auscultation and capnometry   Measured from: lips  ETT to lips (cm): 29  Number of attempts at approach: 1

## 2017-02-17 NOTE — ANESTHESIA POSTPROCEDURE EVALUATION
Patient: Jerilyn Martinez    Procedure Summary     Date Anesthesia Start Anesthesia Stop Room / Location    02/17/17 0741 1036  [facility] / [facility]       Procedure Diagnosis Surgeon Provider    BRONCHOSCOPY, RIGHT VAT, ROBOT ASSISTED RIGHT UPPER LOBECTOMY, INTERCOSTAL NERVE BLOCK WITH CRYOA (Right Chest) No diagnosis on file. MD Jean-Paul Cueto III, MD          Anesthesia Type: general  Last vitals  BP      Temp      Pulse     Resp      SpO2        Post Anesthesia Care and Evaluation    Patient location during evaluation: PACU  Patient participation: complete - patient participated  Level of consciousness: awake  Pain score: 3  Pain management: adequate  Airway patency: patent  Anesthetic complications: No anesthetic complications  PONV Status: none  Cardiovascular status: acceptable  Hydration status: acceptable

## 2017-02-17 NOTE — ANESTHESIA PROCEDURE NOTES
Arterial Line    Patient location during procedure: holding area  Start time: 2/17/2017 7:00 AM  Stop Time:2/17/2017 7:15 AM       Line placed for hemodynamic monitoring.  Performed By   Anesthesiologist: EDGARDO ROCA  Preanesthetic Checklist  Completed: patient identified, site marked, surgical consent, pre-op evaluation, timeout performed, IV checked, risks and benefits discussed and monitors and equipment checked  Arterial Line Prep   Sterile Tech: mask and cap  Prep: ChloraPrep  Patient monitoring: blood pressure monitoring, continuous pulse oximetry and EKG  Arterial Line Procedure   Laterality:left  Location:  radial artery  Catheter size: 20 G   Guidance: landmark technique  Number of attempts: 2  Successful placement: yes          Post Assessment   Dressing Type: occlusive dressing applied, secured with tape and wrist guard applied.   Complications no  Circ/Move/Sens Assessment: unchanged.   Patient Tolerance: patient tolerated the procedure well with no apparent complications

## 2017-02-17 NOTE — ANESTHESIA PREPROCEDURE EVALUATION
Anesthesia Evaluation     Patient summary reviewed and Nursing notes reviewed   NPO Status: > 8 hours   Airway   Mallampati: II  Neck ROM: full  no difficulty expected  Dental - normal exam     Pulmonary     breath sounds clear to auscultation  (+) a smoker, COPD,   Cardiovascular     Rhythm: regular        Neuro/Psych  GI/Hepatic/Renal/Endo    (+)  GERD,     Musculoskeletal     Abdominal    Substance History      OB/GYN          Other                                    Anesthesia Plan    ASA 3     general   (A line)  intravenous induction   Anesthetic plan and risks discussed with patient.

## 2017-02-17 NOTE — H&P
Progress Notes  Encounter Date: 1/26/2017  Yuri Brizuela III, MD   Thoracic Surgery   Expand All Collapse All    []Hide copied text  []Williamver for attribution information  Subjective     Patient ID: Jerilyn Martinez is a 71 y.o. female is here today for follow-up.     History of Present Illness     Jerilyn Martinez was seen in the lung care center at [facility] today January 26, 2017 as part of our multidisciplinary lung cancer clinic. Since her initial evaluation she has undergone a CT directed needle biopsy of the right upper lobe lung mass. She has also had a CT PET scan and pulmonary function tests. Her case was discussed in our multidisciplinary thoracic oncology conference this morning. She is here today to discuss the test results and treatment options.     The needle biopsy went without problems. She experienced no pleuritic pain or shortness of breath after the biopsy. She has had no hemoptysis.     The following portions of the patient's history were reviewed and updated as appropriate: allergies, current medications, past family history, past medical history, past social history, past surgical history and problem list.  Review of Systems   Constitution: Negative.   HENT: Negative.   Eyes: Negative.   Cardiovascular: Negative.   Respiratory: Positive for cough.   Endocrine: Negative.   Hematologic/Lymphatic: Negative.   Skin: Negative.   Musculoskeletal: Positive for back pain, joint pain, joint swelling and stiffness.   Gastrointestinal: Positive for heartburn.   Genitourinary: Negative.   Neurological: Negative.   Psychiatric/Behavioral: Negative.   All other systems reviewed and are negative.         Patient Active Problem List   Diagnosis   • Gastroesophageal reflux disease   • Iron deficiency anemia   • Long term current use of non-steroidal anti-inflammatories (NSAID)   • Mass of right lung   • COPD (chronic obstructive pulmonary disease)   • Malignant neoplasm of upper lobe of right  lung       Medical History         Past Medical History   Diagnosis Date   • Arthritis     • Disease of thyroid gland     • GERD (gastroesophageal reflux disease)     • Lung cancer            Surgical History           Past Surgical History   Procedure Laterality Date   • Bunionectomy       • Thyroid surgery           nodule removed   • Tonsillectomy and adenoidectomy       • Appendectomy       • Hysterectomy       • Lasik                   Family History   Problem Relation Age of Onset   • Lung cancer Mother     • Lung cancer Father     • Lung cancer Sister         Social History    Social History            Social History   • Marital status: Single       Spouse name: N/A   • Number of children: N/A   • Years of education: N/A          Occupational History   • Not on file.              Social History Main Topics   • Smoking status: Former Smoker       Packs/day: 2.00       Years: 20.00       Types: Cigarettes       Quit date: 1987   • Smokeless tobacco: Not on file   • Alcohol use Yes          Comment: socially   • Drug use: Not on file   • Sexual activity: Not on file           Other Topics Concern   • Not on file      Social History Narrative            Current Outpatient Prescriptions:   • aspirin 81 MG EC tablet, Take 81 mg by mouth., Disp: , Rfl:   • Calcium Carbonate-Vitamin D 600-200 MG-UNIT tablet, Take by mouth., Disp: , Rfl:   • Cholecalciferol 1000 UNITS capsule, Take by mouth., Disp: , Rfl:   • cyanocobalamin 100 MCG tablet, Take by mouth., Disp: , Rfl:   • estradiol (ESTRACE) 0.5 MG tablet, Take 0.5 mg by mouth Daily., Disp: , Rfl:   • levothyroxine (SYNTHROID, LEVOTHROID) 75 MCG tablet, , Disp: , Rfl:   • meloxicam (MOBIC) 15 MG tablet, Take 15 mg by mouth Daily As Needed., Disp: , Rfl:   • Multiple Vitamin (MULTIVITAMIN) capsule, Take 1 capsule by mouth., Disp: , Rfl:   • Multiple Vitamins-Minerals (EMERGEN-C VITAMIN C) pack, Take 1 package by mouth Daily As Needed., Disp: , Rfl:   • Omega-3 Fatty  Acids (FISH OIL) 1200 MG capsule capsule, Take by mouth., Disp: , Rfl:   • omeprazole (priLOSEC) 20 MG capsule, , Disp: , Rfl:   • simvastatin (ZOCOR) 40 MG tablet, Take 20 mg by mouth Daily., Disp: , Rfl:        Allergies   Allergen Reactions   • Codeine Shortness Of Breath         Objective         Vitals:     01/26/17 0756   BP: 136/87   Pulse: 78   Resp: 16   Temp: 96.7 °F (35.9 °C)   SpO2: 99%      Physical Exam   Constitutional: She is oriented to person, place, and time. Vital signs are normal. She appears well-developed.   HENT:   Head: Normocephalic and atraumatic.   Neck: Normal range of motion. Neck supple.   Cardiovascular: Normal rate, regular rhythm, normal heart sounds and intact distal pulses.   No murmur heard.  Pulmonary/Chest: Effort normal and breath sounds normal. She has no wheezes. She has no rhonchi. She has no rales. She exhibits no tenderness.   Abdominal: Soft. There is no tenderness.   Musculoskeletal: Normal range of motion. She exhibits no edema or tenderness.   Neurological: She is alert and oriented to person, place, and time. She has normal strength.   Skin: Skin is warm and dry. No rash noted. No cyanosis or erythema.   Psychiatric: She has a normal mood and affect. Her behavior is normal.         Assessment/Plan     Independent Review of Radiographic Studies:   CT PET scan was independently reviewed. The 2 cm mass in the right upper lobe of the lung has a standard uptake value of 15.0. There is no uptake in the hilum or mediastinum. Neck chest abdomen and pelvis showed no uptake. There was some uptake in the nasopharynx but no specific mass was identified.     CT directed needle biopsy was reviewed. Pathology shows adenocarcinoma primary lung.     Pulmonary function tests were also reviewed. The FVC was 2.32 which is 71% of predicted. The FEV1 is 1.74 which is 71% predicted. FEV1 FVC ratio is 75. The diffusion capacity DLCO is 16.3 which is 89% of predicted.     Discussion: This  lady has a stage I carcinoma of the right upper lobe of the lung. We have recommended that she have a right upper lobectomy. Prior to that surgery she will be evaluated by ENT for the finding on PET Scan  in the nasopharynx.     I have discussed with her and her family robot-assisted right upper lobectomy. I've explained the procedure as well as the risks and benefits. I have answered all of her questions. The patient has requested that we proceed. I will keep you informed of her progress.        Diagnoses and all orders for this visit:     Malignant neoplasm of upper lobe of right lung  - Case request  - Ambulatory Referral to ENT (Otolaryngology)      I have seen and examined the patient.  She has been evaluated by Dr. Duran of ENT.  There was no lesion identified in the nasopharynx or the head and neck.  The patient has a stage I adenocarcinoma of the right upper lobe lung.  She is here today for right upper lobectomy.  I have explained to her the procedure of robot-assisted right upper lobectomy.  We have discussed the risks and benefits.  I have answered all of her questions.  She is requested that we proceed.                       Office Visit on 1/26/2017              Revision History              Detailed Report

## 2017-02-18 ENCOUNTER — APPOINTMENT (OUTPATIENT)
Dept: GENERAL RADIOLOGY | Facility: HOSPITAL | Age: 72
End: 2017-02-18

## 2017-02-18 LAB
ANION GAP SERPL CALCULATED.3IONS-SCNC: 11.6 MMOL/L
BUN BLD-MCNC: 12 MG/DL (ref 8–23)
BUN/CREAT SERPL: 21.4 (ref 7–25)
CALCIUM SPEC-SCNC: 8.3 MG/DL (ref 8.6–10.5)
CHLORIDE SERPL-SCNC: 101 MMOL/L (ref 98–107)
CO2 SERPL-SCNC: 24.4 MMOL/L (ref 22–29)
CREAT BLD-MCNC: 0.56 MG/DL (ref 0.57–1)
DEPRECATED RDW RBC AUTO: 44.4 FL (ref 37–54)
ERYTHROCYTE [DISTWIDTH] IN BLOOD BY AUTOMATED COUNT: 14.8 % (ref 11.7–13)
GFR SERPL CREATININE-BSD FRML MDRD: 107 ML/MIN/1.73
GLUCOSE BLD-MCNC: 116 MG/DL (ref 65–99)
HCT VFR BLD AUTO: 30 % (ref 35.6–45.5)
HGB BLD-MCNC: 9.9 G/DL (ref 11.9–15.5)
MCH RBC QN AUTO: 27.1 PG (ref 26.9–32)
MCHC RBC AUTO-ENTMCNC: 33 G/DL (ref 32.4–36.3)
MCV RBC AUTO: 82.2 FL (ref 80.5–98.2)
PLATELET # BLD AUTO: 177 10*3/MM3 (ref 140–500)
PMV BLD AUTO: 10.8 FL (ref 6–12)
POTASSIUM BLD-SCNC: 4.1 MMOL/L (ref 3.5–5.2)
RBC # BLD AUTO: 3.65 10*6/MM3 (ref 3.9–5.2)
SODIUM BLD-SCNC: 137 MMOL/L (ref 136–145)
WBC NRBC COR # BLD: 8.58 10*3/MM3 (ref 4.5–10.7)

## 2017-02-18 PROCEDURE — 25010000003 CEFAZOLIN IN DEXTROSE 2-4 GM/100ML-% SOLUTION: Performed by: THORACIC SURGERY (CARDIOTHORACIC VASCULAR SURGERY)

## 2017-02-18 PROCEDURE — 80048 BASIC METABOLIC PNL TOTAL CA: CPT | Performed by: THORACIC SURGERY (CARDIOTHORACIC VASCULAR SURGERY)

## 2017-02-18 PROCEDURE — 94799 UNLISTED PULMONARY SVC/PX: CPT

## 2017-02-18 PROCEDURE — 94640 AIRWAY INHALATION TREATMENT: CPT

## 2017-02-18 PROCEDURE — 71010 HC CHEST PA OR AP: CPT

## 2017-02-18 PROCEDURE — 25010000002 KETOROLAC TROMETHAMINE PER 15 MG: Performed by: THORACIC SURGERY (CARDIOTHORACIC VASCULAR SURGERY)

## 2017-02-18 PROCEDURE — 85027 COMPLETE CBC AUTOMATED: CPT | Performed by: THORACIC SURGERY (CARDIOTHORACIC VASCULAR SURGERY)

## 2017-02-18 PROCEDURE — 25010000002 HEPARIN (PORCINE) PER 1000 UNITS: Performed by: THORACIC SURGERY (CARDIOTHORACIC VASCULAR SURGERY)

## 2017-02-18 RX ADMIN — Medication 1 TABLET: at 09:05

## 2017-02-18 RX ADMIN — IPRATROPIUM BROMIDE AND ALBUTEROL SULFATE 3 ML: .5; 3 SOLUTION RESPIRATORY (INHALATION) at 00:36

## 2017-02-18 RX ADMIN — SODIUM CHLORIDE 75 ML/HR: 9 INJECTION, SOLUTION INTRAVENOUS at 03:14

## 2017-02-18 RX ADMIN — ESTRADIOL 0.5 MG: 0.5 TABLET ORAL at 09:05

## 2017-02-18 RX ADMIN — METOPROLOL TARTRATE 25 MG: 25 TABLET ORAL at 20:51

## 2017-02-18 RX ADMIN — HYDROCODONE BITARTRATE AND ACETAMINOPHEN 1 TABLET: 7.5; 325 TABLET ORAL at 01:04

## 2017-02-18 RX ADMIN — IPRATROPIUM BROMIDE AND ALBUTEROL SULFATE 3 ML: .5; 3 SOLUTION RESPIRATORY (INHALATION) at 14:26

## 2017-02-18 RX ADMIN — HEPARIN SODIUM 5000 UNITS: 5000 INJECTION, SOLUTION INTRAVENOUS; SUBCUTANEOUS at 07:41

## 2017-02-18 RX ADMIN — LEVOTHYROXINE SODIUM 75 MCG: 0.07 TABLET ORAL at 09:05

## 2017-02-18 RX ADMIN — KETOROLAC TROMETHAMINE 15 MG: 30 INJECTION, SOLUTION INTRAMUSCULAR at 18:32

## 2017-02-18 RX ADMIN — HEPARIN SODIUM 5000 UNITS: 5000 INJECTION, SOLUTION INTRAVENOUS; SUBCUTANEOUS at 14:36

## 2017-02-18 RX ADMIN — VITAMIN B12 0.1 MG ORAL TABLET 100 MCG: 0.1 TABLET ORAL at 09:05

## 2017-02-18 RX ADMIN — OMEPRAZOLE 20 MG: 20 CAPSULE, DELAYED RELEASE ORAL at 07:41

## 2017-02-18 RX ADMIN — IPRATROPIUM BROMIDE AND ALBUTEROL SULFATE 3 ML: .5; 3 SOLUTION RESPIRATORY (INHALATION) at 07:13

## 2017-02-18 RX ADMIN — CALCIUM CARBONATE-VITAMIN D TAB 500 MG-200 UNIT 500 MG: 500-200 TAB at 09:05

## 2017-02-18 RX ADMIN — DOCUSATE SODIUM 100 MG: 100 CAPSULE, LIQUID FILLED ORAL at 09:05

## 2017-02-18 RX ADMIN — DOCUSATE SODIUM 100 MG: 100 CAPSULE, LIQUID FILLED ORAL at 17:48

## 2017-02-18 RX ADMIN — HEPARIN SODIUM 5000 UNITS: 5000 INJECTION, SOLUTION INTRAVENOUS; SUBCUTANEOUS at 20:47

## 2017-02-18 RX ADMIN — POLYETHYLENE GLYCOL 3350 17 G: 17 POWDER, FOR SOLUTION ORAL at 09:06

## 2017-02-18 RX ADMIN — KETOROLAC TROMETHAMINE 15 MG: 30 INJECTION, SOLUTION INTRAMUSCULAR at 04:36

## 2017-02-18 RX ADMIN — CEFAZOLIN SODIUM 2 G: 2 INJECTION, SOLUTION INTRAVENOUS at 01:04

## 2017-02-18 RX ADMIN — DOCUSATE SODIUM -SENNOSIDES 2 TABLET: 50; 8.6 TABLET, COATED ORAL at 20:47

## 2017-02-18 RX ADMIN — HYDROCODONE BITARTRATE AND ACETAMINOPHEN 1 TABLET: 7.5; 325 TABLET ORAL at 23:11

## 2017-02-18 RX ADMIN — KETOROLAC TROMETHAMINE 15 MG: 30 INJECTION, SOLUTION INTRAMUSCULAR at 12:32

## 2017-02-18 RX ADMIN — HYDROCODONE BITARTRATE AND ACETAMINOPHEN 1 TABLET: 7.5; 325 TABLET ORAL at 07:41

## 2017-02-18 RX ADMIN — ASPIRIN 81 MG: 81 TABLET ORAL at 09:05

## 2017-02-18 RX ADMIN — Medication 20 MG: at 20:47

## 2017-02-18 NOTE — PLAN OF CARE
Problem: Patient Care Overview (Adult)  Goal: Plan of Care Review  Outcome: Ongoing (interventions implemented as appropriate)    02/17/17 2450   Coping/Psychosocial Response Interventions   Plan Of Care Reviewed With patient   Patient Care Overview   Progress improving   Outcome Evaluation   Outcome Summary/Follow up Plan VSS, CT -20 suction and will place to WS at 0500. C/o of some pain with movement, tordol given. Encouraged pulmonary hygiene. Will continue to monitor.        Goal: Adult Individualization and Mutuality  Outcome: Ongoing (interventions implemented as appropriate)  Goal: Discharge Needs Assessment  Outcome: Ongoing (interventions implemented as appropriate)    Problem: Fall Risk (Adult)  Goal: Identify Related Risk Factors and Signs and Symptoms  Outcome: Ongoing (interventions implemented as appropriate)  Goal: Absence of Falls  Outcome: Ongoing (interventions implemented as appropriate)    Problem: Chest Tube Drainage Device (Adult)  Goal: Signs and Symptoms of Listed Potential Problems Will be Absent or Manageable (Chest Tube Drainage Device)  Outcome: Ongoing (interventions implemented as appropriate)

## 2017-02-18 NOTE — SIGNIFICANT NOTE
02/18/17 1515   Rehab Treatment   Discipline physical therapist   Rehab Evaluation   Evaluation Not Performed other (see comments)  (Pt has walked 2 times with 150ft X2  assist and wants to work tomorrow with P.T )   Recommendation   PT - Next Appointment 02/19/17

## 2017-02-18 NOTE — PROGRESS NOTES
"    Chief Complaint:  Post op  S/P:BRONCHOSCOPY, RIGHT VAT, ROBOT ASSISTED RIGHT UPPER LOBECTOMY, INTERCOSTAL NERVE BLOCK WITH CRYOA  POD # 1    Subjective:  Symptoms:  Stable.  No chest pain or chest pressure.    Diet:  Adequate intake.  No nausea.    Activity level: Normal with assistance.    Pain:  She complains of pain that is mild.  She reports pain is improving.  Pain is requiring pain medication and partially controlled.        Vital Signs:  Temp:  [97.2 °F (36.2 °C)-98.3 °F (36.8 °C)] 97.6 °F (36.4 °C)  Heart Rate:  [] 86  Resp:  [14-18] 18  BP: ()/(47-87) 93/48  Arterial Line BP: ()/(48-75) 96/49      Intake/Output Summary (Last 24 hours) at 02/18/17 0914  Last data filed at 02/18/17 0906   Gross per 24 hour   Intake 3429.5 ml   Output   1279 ml   Net 2150.5 ml       Objective:  General Appearance:  In no acute distress.    Vital signs: (most recent): Blood pressure 93/48, pulse 86, temperature 97.6 °F (36.4 °C), temperature source Oral, resp. rate 18, height 66\" (167.6 cm), weight 145 lb (65.8 kg), SpO2 98 %.    Output: Producing urine.    Lungs:  Normal effort.  Breath sounds clear to auscultation.    Heart: Normal rate.  S1 normal and S2 normal.    Chest: Chest wall tenderness present.    Abdomen: Bowel sounds are normal.     Extremities: Normal range of motion.    Pulses: Distal pulses are intact.    Neurological: Patient is alert and oriented to person, place and time.    Pupils:  Pupils are equal, round, and reactive to light.    Skin:  Warm and dry.              Chest tube:   Site: Right, Clean, Dry and Intact  Suction: waterseal  Air Leak: negative  Level: 140  24 Hour Total: 140    Results Review:     I reviewed the patient's new clinical results.  I reviewed the patient's new imaging results and agree with the interpretation.  I reviewed the patient's other test results and agree with the interpretation    Radiologist Interpretation:   XR CHEST 1 VW-      HISTORY: Female who is " 71 years-old, chest tube      TECHNIQUE: Frontal view of the chest      COMPARISON: 02/17/2017      FINDINGS: Right chest tube appears stable. Heart size is borderline.  Some right basilar density could reflect subpulmonic effusion and/or  atelectasis. No pneumothorax. No acute osseous process.      IMPRESSION:  Small right basilar atelectasis and/or effusion.    Lab Results:      Lab Results   Component Value Date    WBC 8.58 02/18/2017    HGB 9.9 (L) 02/18/2017    HCT 30.0 (L) 02/18/2017     02/18/2017     Lab Results   Component Value Date     02/18/2017    K 4.1 02/18/2017     02/18/2017    CO2 24.4 02/18/2017    BUN 12 02/18/2017    CREATININE 0.56 (L) 02/18/2017    GLUCOSE 116 (H) 02/18/2017        Assessment/Plan     Active Problems:    Malignant neoplasm of upper lobe of right lung    Lung cancer      Assessment:  (Stable post op.  Pain controlled ).     Plan:   (Doing well. Discontinue F/C, A line, and IVF. Ambulate as tolerated. Repeat x-ray in am. ).       Toney Champagne MD  [facility]  02/18/17  9:14 AM

## 2017-02-18 NOTE — PLAN OF CARE
Problem: Patient Care Overview (Adult)  Goal: Plan of Care Review  Outcome: Ongoing (interventions implemented as appropriate)    02/18/17 4391   Coping/Psychosocial Response Interventions   Plan Of Care Reviewed With patient   Patient Care Overview   Progress improving   Outcome Evaluation   Outcome Summary/Follow up Plan patient doing very well post op, sat in chair and ambulated 2x, pain controlled. ct-ws with intermittent air leak.        Goal: Adult Individualization and Mutuality  Outcome: Ongoing (interventions implemented as appropriate)    Problem: Perioperative Period (Adult)  Goal: Signs and Symptoms of Listed Potential Problems Will be Absent or Manageable (Perioperative Period)  Outcome: Ongoing (interventions implemented as appropriate)    Problem: Fall Risk (Adult)  Goal: Identify Related Risk Factors and Signs and Symptoms  Outcome: Ongoing (interventions implemented as appropriate)  Goal: Absence of Falls  Outcome: Ongoing (interventions implemented as appropriate)    Problem: Chest Tube Drainage Device (Adult)  Goal: Signs and Symptoms of Listed Potential Problems Will be Absent or Manageable (Chest Tube Drainage Device)  Outcome: Ongoing (interventions implemented as appropriate)

## 2017-02-19 ENCOUNTER — APPOINTMENT (OUTPATIENT)
Dept: GENERAL RADIOLOGY | Facility: HOSPITAL | Age: 72
End: 2017-02-19

## 2017-02-19 PROCEDURE — 97161 PT EVAL LOW COMPLEX 20 MIN: CPT

## 2017-02-19 PROCEDURE — 25010000002 HEPARIN (PORCINE) PER 1000 UNITS: Performed by: THORACIC SURGERY (CARDIOTHORACIC VASCULAR SURGERY)

## 2017-02-19 PROCEDURE — 25010000002 KETOROLAC TROMETHAMINE PER 15 MG: Performed by: THORACIC SURGERY (CARDIOTHORACIC VASCULAR SURGERY)

## 2017-02-19 PROCEDURE — 71010 HC CHEST PA OR AP: CPT

## 2017-02-19 PROCEDURE — 94799 UNLISTED PULMONARY SVC/PX: CPT

## 2017-02-19 PROCEDURE — 94640 AIRWAY INHALATION TREATMENT: CPT

## 2017-02-19 RX ADMIN — HEPARIN SODIUM 5000 UNITS: 5000 INJECTION, SOLUTION INTRAVENOUS; SUBCUTANEOUS at 05:29

## 2017-02-19 RX ADMIN — VITAMIN B12 0.1 MG ORAL TABLET 100 MCG: 0.1 TABLET ORAL at 08:18

## 2017-02-19 RX ADMIN — HEPARIN SODIUM 5000 UNITS: 5000 INJECTION, SOLUTION INTRAVENOUS; SUBCUTANEOUS at 14:07

## 2017-02-19 RX ADMIN — KETOROLAC TROMETHAMINE 15 MG: 30 INJECTION, SOLUTION INTRAMUSCULAR at 05:30

## 2017-02-19 RX ADMIN — OMEPRAZOLE 20 MG: 20 CAPSULE, DELAYED RELEASE ORAL at 05:30

## 2017-02-19 RX ADMIN — HYDROCODONE BITARTRATE AND ACETAMINOPHEN 1 TABLET: 7.5; 325 TABLET ORAL at 22:16

## 2017-02-19 RX ADMIN — HYDROCODONE BITARTRATE AND ACETAMINOPHEN 1 TABLET: 7.5; 325 TABLET ORAL at 11:45

## 2017-02-19 RX ADMIN — DOCUSATE SODIUM 100 MG: 100 CAPSULE, LIQUID FILLED ORAL at 08:18

## 2017-02-19 RX ADMIN — IPRATROPIUM BROMIDE AND ALBUTEROL SULFATE 3 ML: .5; 3 SOLUTION RESPIRATORY (INHALATION) at 07:02

## 2017-02-19 RX ADMIN — HEPARIN SODIUM 5000 UNITS: 5000 INJECTION, SOLUTION INTRAVENOUS; SUBCUTANEOUS at 22:15

## 2017-02-19 RX ADMIN — DOCUSATE SODIUM 100 MG: 100 CAPSULE, LIQUID FILLED ORAL at 18:08

## 2017-02-19 RX ADMIN — Medication 20 MG: at 22:26

## 2017-02-19 RX ADMIN — Medication 1 TABLET: at 08:18

## 2017-02-19 RX ADMIN — CALCIUM CARBONATE-VITAMIN D TAB 500 MG-200 UNIT 500 MG: 500-200 TAB at 08:18

## 2017-02-19 RX ADMIN — ASPIRIN 81 MG: 81 TABLET ORAL at 08:18

## 2017-02-19 RX ADMIN — HYDROCODONE BITARTRATE AND ACETAMINOPHEN 1 TABLET: 7.5; 325 TABLET ORAL at 18:12

## 2017-02-19 RX ADMIN — ESTRADIOL 0.5 MG: 0.5 TABLET ORAL at 08:23

## 2017-02-19 RX ADMIN — LEVOTHYROXINE SODIUM 75 MCG: 0.07 TABLET ORAL at 08:23

## 2017-02-19 RX ADMIN — POLYETHYLENE GLYCOL 3350 17 G: 17 POWDER, FOR SOLUTION ORAL at 08:25

## 2017-02-19 RX ADMIN — DOCUSATE SODIUM -SENNOSIDES 2 TABLET: 50; 8.6 TABLET, COATED ORAL at 22:16

## 2017-02-19 NOTE — PLAN OF CARE
Problem: Patient Care Overview (Adult)  Goal: Plan of Care Review  Outcome: Ongoing (interventions implemented as appropriate)    02/19/17 1315   Coping/Psychosocial Response Interventions   Plan Of Care Reviewed With patient   Patient Care Overview   Progress no change   Outcome Evaluation   Outcome Summary/Follow up Plan ct-ws, small ptx, intermittent air leak, cxr in am, pain controlled, ambulated and sat in mary ann multiple times.        Goal: Adult Individualization and Mutuality  Outcome: Ongoing (interventions implemented as appropriate)    Problem: Perioperative Period (Adult)  Goal: Signs and Symptoms of Listed Potential Problems Will be Absent or Manageable (Perioperative Period)  Outcome: Ongoing (interventions implemented as appropriate)    Problem: Fall Risk (Adult)  Goal: Identify Related Risk Factors and Signs and Symptoms  Outcome: Ongoing (interventions implemented as appropriate)  Goal: Absence of Falls  Outcome: Ongoing (interventions implemented as appropriate)    Problem: Chest Tube Drainage Device (Adult)  Goal: Signs and Symptoms of Listed Potential Problems Will be Absent or Manageable (Chest Tube Drainage Device)  Outcome: Ongoing (interventions implemented as appropriate)

## 2017-02-19 NOTE — PROGRESS NOTES
"    Chief Complaint:  Post op  S/P:BRONCHOSCOPY, RIGHT VAT, ROBOT ASSISTED RIGHT UPPER LOBECTOMY, INTERCOSTAL NERVE BLOCK WITH CRYOA  POD # 2    Subjective:  Symptoms:  Improved.  No chest pain or chest pressure.    Diet:  Adequate intake.  No nausea.    Activity level: Normal with assistance.    Pain:  She complains of pain that is mild.  She reports pain is improving.  Pain is requiring pain medication and well controlled.        Vital Signs:  Temp:  [97.1 °F (36.2 °C)-98.4 °F (36.9 °C)] 98.4 °F (36.9 °C)  Heart Rate:  [57-83] 57  Resp:  [16-18] 16  BP: ()/(52-76) 110/58      Intake/Output Summary (Last 24 hours) at 02/19/17 1149  Last data filed at 02/19/17 0926   Gross per 24 hour   Intake    480 ml   Output   1188 ml   Net   -708 ml       Objective:  General Appearance:  In no acute distress.    Vital signs: (most recent): Blood pressure 110/58, pulse 57, temperature 98.4 °F (36.9 °C), temperature source Oral, resp. rate 16, height 66\" (167.6 cm), weight 145 lb (65.8 kg), SpO2 97 %.    Output: Producing urine.    Lungs:  Normal effort.  Breath sounds clear to auscultation.    Heart: Normal rate.  S1 normal and S2 normal.    Chest: Chest wall tenderness present.    Abdomen: Bowel sounds are normal.     Extremities: Normal range of motion.    Pulses: Distal pulses are intact.    Neurological: Patient is alert and oriented to person, place and time.    Pupils:  Pupils are equal, round, and reactive to light.    Skin:  Warm and dry.              Chest tube:   Site: Right, Clean, Dry and Intact  Suction: waterseal  Air Leak: Intermittent   Level: 440  24 Hour Total: 300    Results Review:     I reviewed the patient's new clinical results.  I reviewed the patient's new imaging results and agree with the interpretation.  I reviewed the patient's other test results and agree with the interpretation    Radiologist Interpretation:   XR CHEST 1 VW-      HISTORY: Female who is 71 years-old, chest " tubes      TECHNIQUE: Frontal view of the chest      COMPARISON: 02/18/2017      FINDINGS:Right chest tube appears stable. Heart appears mildly enlarged.  Aeration at the right base appears partially improved. Small right  apical pneumothorax has developed, measuring about 5 mm at the apex. No  acute osseous process.          IMPRESSION:  Small right apical pneumothorax has developed. Continued follow-up  suggested.      Discussed by telephone with the patient's nurse, Ynes, at time of  interpretation, 0617 on 2/19/2017    Lab Results:      Lab Results   Component Value Date    WBC 8.58 02/18/2017    HGB 9.9 (L) 02/18/2017    HCT 30.0 (L) 02/18/2017     02/18/2017     Lab Results   Component Value Date     02/18/2017    K 4.1 02/18/2017     02/18/2017    CO2 24.4 02/18/2017    BUN 12 02/18/2017    CREATININE 0.56 (L) 02/18/2017    GLUCOSE 116 (H) 02/18/2017        Assessment/Plan     Active Problems:    Malignant neoplasm of upper lobe of right lung    Lung cancer      Assessment:  (Stable post op.  Pain controlled   Small intermittent air leak vs residual space. ).     Plan:   (Doing well. Ambulate. Small pneumothorax on today's xray, with small intermittent air leak.  Repeat x-ray in am. ).       Toney Champagne MD  [facility]  02/19/17  11:49 AM

## 2017-02-19 NOTE — PLAN OF CARE
Problem: Patient Care Overview (Adult)  Goal: Plan of Care Review  Outcome: Ongoing (interventions implemented as appropriate)    02/18/17 1954   Coping/Psychosocial Response Interventions   Plan Of Care Reviewed With patient   Patient Care Overview   Progress improving   Outcome Evaluation   Outcome Summary/Follow up Plan VSS, pain controlled, ambulating in ulloa and up to chair. CT remains to WS with intermittent airleak. Plan for chest xray in am. Pulmonary hygiene encouraged. Will continue to monitor.        Goal: Adult Individualization and Mutuality  Outcome: Ongoing (interventions implemented as appropriate)  Goal: Discharge Needs Assessment  Outcome: Ongoing (interventions implemented as appropriate)    Problem: Fall Risk (Adult)  Goal: Identify Related Risk Factors and Signs and Symptoms  Outcome: Ongoing (interventions implemented as appropriate)  Goal: Absence of Falls  Outcome: Ongoing (interventions implemented as appropriate)    Problem: Chest Tube Drainage Device (Adult)  Goal: Signs and Symptoms of Listed Potential Problems Will be Absent or Manageable (Chest Tube Drainage Device)  Outcome: Ongoing (interventions implemented as appropriate)

## 2017-02-19 NOTE — PLAN OF CARE
Problem: Patient Care Overview (Adult)  Goal: Plan of Care Review    02/19/17 0926   Outcome Evaluation   Outcome Summary/Follow up Plan Pt is a 72 y/o female presenting to PT s/p bronchoscopy, R VAT, and R upper lobectomy. Pt transfers independently and ambulated 300 ft. with supervision/ assist with equipment. Pt scored 27/28 on Tinetti balance score. Pt to continue ambulating with nursing. Pt is functional, safe, and will be independent with ambulation once chest tube is removed. Skilled PT is not indicated at this time

## 2017-02-19 NOTE — THERAPY DISCHARGE NOTE
Acute Care - Physical Therapy Initial Eval/Discharge  [facility]     Patient Name: Jerilyn Martinez  : 1945  MRN: [id number]  Today's Date: 2017   Onset of Illness/Injury or Date of Surgery Date: 17            Admit Date: 2017    Visit Dx:    ICD-10-CM ICD-9-CM   1. Muscle weakness (generalized) M62.81 728.87   2. Malignant neoplasm of upper lobe of right lung C34.11 162.3   3. Adenocarcinoma of lung C34.90 162.9     Patient Active Problem List   Diagnosis   • Gastroesophageal reflux disease   • Iron deficiency anemia   • Long term current use of non-steroidal anti-inflammatories (NSAID)   • COPD (chronic obstructive pulmonary disease)   • Malignant neoplasm of upper lobe of right lung   • Lung cancer     Past Medical History   Diagnosis Date   • Arthritis    • Disease of thyroid gland      HYPOTHYROIDISM   • GERD (gastroesophageal reflux disease)    • High cholesterol    • Lung cancer      RIGHT UPPER LOBE   • Slow to wake up after anesthesia    • Spinal stenosis    • Wears contact lenses      Past Surgical History   Procedure Laterality Date   • Bunionectomy       X 2   • Thyroid surgery       nodule removed   • Tonsillectomy and adenoidectomy     • Appendectomy     • Hysterectomy     • Lasik            PT ASSESSMENT (last 72 hours)      PT Evaluation       17 0913 17 1515    Rehab Evaluation    Document Type evaluation  -KH     Subjective Information agree to therapy;no complaints  -KH     Evaluation Not Performed  other (see comments)   Pt has walked 2 times with 150ft X2  assist and wants to work tomorrow with P.T   -SP    Patient Effort, Rehab Treatment excellent  -KH     Symptoms Noted During/After Treatment shortness of breath  -KH     General Information    Onset of Illness/Injury or Date of Surgery Date 17  -KH     General Observations pt reclined in chair in no acute distress; chest tube  -KH     Pertinent History Of Current Problem lung cancer  -KH     Prior  Level of Function independent:;gait;transfer;bed mobility;community mobility   pt reports she tries to walk 10,000 steps per day  -     Equipment Currently Used at Home none  -     Barriers to Rehab none identified  -     Clinical Impression    Patient/Family Goals Statement return home  -     Criteria for Skilled Therapeutic Interventions Met no;no problems identified which require skilled intervention  -     Vital Signs    Pre SpO2 (%) 99  -KH     O2 Delivery Pre Treatment room air  -KH     Post SpO2 (%) 99  -KH     O2 Delivery Post Treatment room air  -     Pain Assessment    Pain Assessment FLACC  -     Pain Score 2  -     Pain Location Chest  -     Pain Intervention(s) Repositioned;Ambulation/increased activity  -     Cognitive Assessment/Intervention    Current Cognitive/Communication Assessment functional  -     Orientation Status oriented x 4  -     Follows Commands/Answers Questions 100% of the time  -     Personal Safety WNL/WFL  -     Personal Safety Interventions fall prevention program maintained;nonskid shoes/slippers when out of bed  -     ROM (Range of Motion)    General ROM no range of motion deficits identified  -     MMT (Manual Muscle Testing)    General MMT Assessment no strength deficits identified  -     Bed Mobility, Assessment/Treatment    Bed Mob, Supine to Sit, Beltrami not tested   up in chair  -     Bed Mob, Sit to Supine, Beltrami not tested   up in chair  -     Transfer Assessment/Treatment    Transfers, Sit-Stand Beltrami independent   without use of UE  -     Transfers, Stand-Sit Beltrami independent  -     Gait Assessment/Treatment    Gait, Beltrami Level supervision required   assist with equipment secondary to chest tube  -     Gait, Assistive Device other (see comments)   none  -KH     Gait, Distance (Feet) 300  -     Stairs Assessment/Treatment    Stairs, Beltrami Level not tested   pt reports she does not  have any stairs  -     Motor Skills/Interventions    Additional Documentation Balance Skills Training (Group)  -     Balance Skills Training    Standing-Level of Assistance Distant supervision  -     Static Standing Balance Support No upper extremity supported  -     Standing-Balance Activities --   eyes closed; nudge test for Tinetti  -     Gait Balance-Level of Assistance Close supervision  -     Therapy Exercises    Bilateral Lower Extremities AROM:;5 reps;sitting;ankle pumps/circles;hip flexion;LAQ   educated on exercises while in chair  -     Positioning and Restraints    Pre-Treatment Position sitting in chair/recliner  -     Post Treatment Position chair  -     In Chair sitting;call light within reach;encouraged to call for assist  -       02/17/17 1600       General Information    Equipment Currently Used at Home none  -AP     Living Environment    Lives With alone  -AP     Living Arrangements condominium  -AP     Home Accessibility no concerns  -AP     Stair Railings at Home none  -AP     Type of Financial/Environmental Concern none  -AP     Transportation Available car;family or friend will provide  -AP       User Key  (r) = Recorded By, (t) = Taken By, (c) = Cosigned By    Initials Name Provider Type    JOSELINE Charles, PT Physical Therapist    AP Gale Cedeno, RN Registered Nurse    SP Phyllis Jaeger, PT Physical Therapist          Physical Therapy Education     Title: PT OT SLP Therapies (Resolved)     Topic: Physical Therapy (Resolved)     Point: Mobility training (Resolved)    Learning Progress Summary    Learner Readiness Method Response Comment Documented by Status   Patient Acceptance E PASTORA   02/19/17 0925 Done               Point: Home exercise program (Resolved)    Learning Progress Summary    Learner Readiness Method Response Comment Documented by Status   Patient Acceptance DEANDRA GUILLORY   02/19/17 0925 Done                      User Key     Initials Effective Dates Name  "Provider Type Discipline     12/01/15 -  Kamryn Charles PT Physical Therapist PT                PT Recommendation and Plan  Anticipated Discharge Disposition: home with assist  PT Frequency: evaluation only  Plan of Care Review  Outcome Summary/Follow up Plan: Pt is a 72 y/o female presenting to PT s/p bronchoscopy, R VAT, and R upper lobectomy. Pt transfers independently and ambulated 300 ft. with supervision/ assist with equipment. Pt scored 27/28 on Tinettie balance score. Pt to continue ambulating with nursing. Pt is functional, safe, and will be independent with ambulation once chest tube is removed. Skilled PT is not indicated at this time              Outcome Measures       02/19/17 0934          Tinetti Assessment    Sitting Balance 1  -KH      Arises 2  -KH      Attempts to Rise 2  -KH      Immediate Standing Balance (first 5 sec) 2  -KH      Standing Balance 2  -KH      Sternal Nudge (feet close together) 2  -KH      Eyes Closed (feet close together) 1  -KH      Turning 360 Degrees- Steps 1  -KH      Turning 360 Degrees- Steadiness 1  -KH      Sitting Down 2  -KH      Tinetti Balance Score 16  -KH      Gait Initiation (immediate after told \"go\") 1  -KH      Step Length- Right Swing 1  -KH      Step Length- Left Swing 1  -KH      Foot Clearance- Right Foot 1  -KH      Foot Clearance- Left Foot 1  -KH      Step Symmetry 1  -KH      Step Continuity 1  -KH      Path (excursion) 2  -KH      Trunk 2  -KH      Base of Support 0  -KH      Gait Score 11  -KH      Tinetti Total Score 27  -KH      Tinetti Assistive Device --   none  -KH      Functional Assessment    Outcome Measure Options Tinetti  -KH        User Key  (r) = Recorded By, (t) = Taken By, (c) = Cosigned By    Initials Name Provider Type     Kamryn Charles PT Physical Therapist           Time Calculation:         PT Charges       02/19/17 0999          Time Calculation    Start Time 0859  -      Stop Time 0911  -      Time Calculation (min) " 12 min  -JOSELINE      PT Received On 02/19/17  -JOSELINE        User Key  (r) = Recorded By, (t) = Taken By, (c) = Cosigned By    Initials Name Provider Type    JOSELINE Charles PT Physical Therapist          Therapy Charges for Today     Code Description Service Date Service Provider Modifiers Qty    82944445894 HC PT EVAL LOW COMPLEXITY 1 2/19/2017 Kamryn Charles PT GP 1          PT G-Codes  Outcome Measure Options: Tinetti    PT Discharge Summary  Anticipated Discharge Disposition: home with assist  Reason for Discharge: other (comment) (Nursing has been walking pt 300 ft. a couple times per day. Pt is independent with transfers and will be independent with ambulation once chest tube is removed. Scored 27/28 on Tinetti and demonstrates independence with HEP)    Kamryn Charles PT  2/19/2017

## 2017-02-20 ENCOUNTER — APPOINTMENT (OUTPATIENT)
Dept: GENERAL RADIOLOGY | Facility: HOSPITAL | Age: 72
End: 2017-02-20

## 2017-02-20 LAB
CYTO UR: NORMAL
LAB AP CASE REPORT: NORMAL
LAB AP SYNOPTIC CHECKLIST: NORMAL
Lab: NORMAL
Lab: NORMAL
PATH REPORT.FINAL DX SPEC: NORMAL
PATH REPORT.GROSS SPEC: NORMAL

## 2017-02-20 PROCEDURE — 71010 HC CHEST PA OR AP: CPT

## 2017-02-20 PROCEDURE — 94799 UNLISTED PULMONARY SVC/PX: CPT

## 2017-02-20 PROCEDURE — 25010000002 HEPARIN (PORCINE) PER 1000 UNITS: Performed by: THORACIC SURGERY (CARDIOTHORACIC VASCULAR SURGERY)

## 2017-02-20 PROCEDURE — 99024 POSTOP FOLLOW-UP VISIT: CPT | Performed by: NURSE PRACTITIONER

## 2017-02-20 RX ADMIN — ASPIRIN 81 MG: 81 TABLET ORAL at 08:09

## 2017-02-20 RX ADMIN — ESTRADIOL 0.5 MG: 0.5 TABLET ORAL at 08:29

## 2017-02-20 RX ADMIN — DOCUSATE SODIUM 100 MG: 100 CAPSULE, LIQUID FILLED ORAL at 08:09

## 2017-02-20 RX ADMIN — METOPROLOL TARTRATE 25 MG: 25 TABLET ORAL at 08:09

## 2017-02-20 RX ADMIN — Medication 1 TABLET: at 08:09

## 2017-02-20 RX ADMIN — CALCIUM CARBONATE-VITAMIN D TAB 500 MG-200 UNIT 500 MG: 500-200 TAB at 08:09

## 2017-02-20 RX ADMIN — HEPARIN SODIUM 5000 UNITS: 5000 INJECTION, SOLUTION INTRAVENOUS; SUBCUTANEOUS at 14:08

## 2017-02-20 RX ADMIN — HEPARIN SODIUM 5000 UNITS: 5000 INJECTION, SOLUTION INTRAVENOUS; SUBCUTANEOUS at 21:34

## 2017-02-20 RX ADMIN — HEPARIN SODIUM 5000 UNITS: 5000 INJECTION, SOLUTION INTRAVENOUS; SUBCUTANEOUS at 06:21

## 2017-02-20 RX ADMIN — Medication 20 MG: at 21:35

## 2017-02-20 RX ADMIN — LEVOTHYROXINE SODIUM 75 MCG: 0.07 TABLET ORAL at 08:30

## 2017-02-20 RX ADMIN — POLYETHYLENE GLYCOL 3350 17 G: 17 POWDER, FOR SOLUTION ORAL at 08:09

## 2017-02-20 RX ADMIN — OMEPRAZOLE 20 MG: 20 CAPSULE, DELAYED RELEASE ORAL at 06:21

## 2017-02-20 RX ADMIN — HYDROCODONE BITARTRATE AND ACETAMINOPHEN 1 TABLET: 7.5; 325 TABLET ORAL at 12:09

## 2017-02-20 RX ADMIN — VITAMIN B12 0.1 MG ORAL TABLET 100 MCG: 0.1 TABLET ORAL at 08:09

## 2017-02-20 RX ADMIN — METOPROLOL TARTRATE 25 MG: 25 TABLET ORAL at 21:34

## 2017-02-20 RX ADMIN — HYDROCODONE BITARTRATE AND ACETAMINOPHEN 1 TABLET: 7.5; 325 TABLET ORAL at 21:38

## 2017-02-20 RX ADMIN — DOCUSATE SODIUM 100 MG: 100 CAPSULE, LIQUID FILLED ORAL at 17:13

## 2017-02-20 NOTE — PROGRESS NOTES
Discharge Planning Assessment  [facility]     Patient Name: Jerilyn Martinez  MRN: [id number]  Today's Date: 2/20/2017    Admit Date: 2/17/2017          Discharge Needs Assessment       02/20/17 1313    Living Environment    Lives With alone    Living Arrangements condominium    Provides Primary Care For no one    Quality Of Family Relationships helpful;involved    Able to Return to Prior Living Arrangements yes    Living Arrangement Comments Patient live alone but says her family will stay with her and she has several neighbors she can call.     Discharge Needs Assessment    Concerns To Be Addressed no discharge needs identified;denies needs/concerns at this time    Community Agency Name(S) None    Anticipated Changes Related to Illness none    Equipment Currently Used at Home none    Equipment Needed After Discharge none    Transportation Available car;family or friend will provide    Current Discharge Risk lives alone    Discharge Disposition still a patient    Discharge Contact Information if Applicable Heidy Blas danette [phone], Rylie Lopez sister [phone] and Oxana Mcmillan sister [phone]    Discharge Planning Comments Plans home with family assist             Discharge Plan       02/20/17 1317    Case Management/Social Work Plan    Plan Plans home with family assist     Patient/Family In Agreement With Plan yes    Additional Comments Confirmed and updated face sheet. IMM 2/17/17.        Discharge Placement     No information found                Demographic Summary       02/20/17 1312    Referral Information    Arrived From home or self-care    Referral Source admission list    Record Reviewed clinical discipline documentation;history and physical;medical record    Contact Information    Permission Granted to Share Information With     Primary Care Physician Information    Name Dr. Baez            Functional Status       02/20/17 1313    Functional Status Current    Ambulation  2-->assistive person    Transferring 2-->assistive person    Toileting 2-->assistive person    Bathing 2-->assistive person    Dressing 2-->assistive person    Eating 0-->independent    Communication 0-->understands/communicates without difficulty    Swallowing (if score 2 or more for any item, consult Rehab Services) 0-->swallows foods/liquids without difficulty    Current Functional Level Comment Post op with CT    Change in Functional Status Since Onset of Current Illness/Injury yes    Functional Status Prior    Ambulation 0-->independent    Transferring 0-->independent    Toileting 0-->independent    Bathing 0-->independent    Dressing 0-->independent    Eating 0-->independent    Communication 0-->understands/communicates without difficulty    Swallowing 0-->swallows foods/liquids without difficulty    Activity Tolerance    Usual Activity Tolerance good    Current Activity Tolerance moderate            Psychosocial     None            Abuse/Neglect     None            Legal     None            Substance Abuse     None            Patient Forms     None          Mauricio Ramos RN  Continued Stay Note  [facility]     Patient Name: Jerilyn Martinez  MRN: [id number]  Today's Date: 2/20/2017    Admit Date: 2/17/2017          Discharge Plan       02/20/17 1317    Case Management/Social Work Plan    Plan Plans home with family assist     Patient/Family In Agreement With Plan yes    Additional Comments Confirmed and updated face sheet. IMM 2/17/17.              Discharge Codes     None            Mauricio Ramos RN

## 2017-02-20 NOTE — PROGRESS NOTES
"    Chief Complaint: Post op  S/P:BRONCHOSCOPY, RIGHT VAT, ROBOT ASSISTED RIGHT UPPER LOBECTOMY, INTERCOSTAL NERVE BLOCK WITH CRYOA  POD # 3    Subjective:  Symptoms:  Stable.  No shortness of breath, cough or chest pain.  (Today she does not feel as well as yesterday.  She states she had a lot of visitors and increased tiredness today.  Not much appetite.  Episode of increased chest congestion this am and had trouble breathing which resolved after coughing up sputum. No SOA.  No N/V.  No BM yet but positive flatus.  ).    Diet:  Adequate intake.  No nausea or vomiting.    Activity level: Normal.    Pain:  She reports no pain.        Vital Signs:  Temp:  [97.3 °F (36.3 °C)-98 °F (36.7 °C)] 98 °F (36.7 °C)  Heart Rate:  [67-79] 76  Resp:  [16-18] 16  BP: (104-120)/(61-70) 120/70      Intake/Output Summary (Last 24 hours) at 02/20/17 0925  Last data filed at 02/20/17 0600   Gross per 24 hour   Intake    240 ml   Output   2875 ml   Net  -2635 ml       Objective:  General Appearance:  Comfortable and in no acute distress.    Vital signs: (most recent): Blood pressure 120/70, pulse 76, temperature 98 °F (36.7 °C), temperature source Oral, resp. rate 16, height 66\" (167.6 cm), weight 145 lb (65.8 kg), SpO2 94 %.  Vital signs are normal.  No fever.    Lungs:  Normal respiratory rate and normal effort.  There are decreased breath sounds (right upper lobe ).  No wheezes, rales or rhonchi.    Heart: Normal rate.  Regular rhythm.  No murmur.   Abdomen: Abdomen is soft and non-distended.    Extremities: Normal range of motion.  There is no dependent edema.    Neurological: Patient is alert and oriented to person, place and time.    Skin:  Warm and dry.              Chest tube:   Site: Right, Clean, Dry, Intact and Securement device intact  Suction: waterseal  Air Leak: negative  Level: 670  24 Hour Total: 250    Results Review:     I reviewed the patient's new clinical results.  I reviewed the patient's new imaging results and " agree with the interpretation.    Radiologist Interpretation:  Study Result   CLINICAL HISTORY: 71-year-old female 3 days postop right video-assisted  thoracoscopy with right upper lobectomy for malignancy. Follow-up with  right chest tube.      Exam: PORTABLE AP ERECT CHEST DATED 02/20/2017 AT 0549 HOURS.      FINDINGS: When compared to the exam dated 02/19/2017 at 0513 hours, the  right chest tube remains with its tip at the lateral right apical  distribution. There are dual demonstrated pleural interfaces at the  right apex with apical pneumothorax of at least 5 mm thickness but with  another interface approximately 2.8 cm from the superolateral to apical  parietal pleura. Some elevation of right hemidiaphragm remains. Coarse  markings and possible patchy atelectasis or infiltrate in the left lung  base are present and appear increased from prior exam. Costophrenic  angles are sharp. The heart appears borderline enlarged caliber as  before. Aortic uncoiling and trace calcification are present.       Monitoring lead wires are present. There is increasing subcutaneous air  in the right chest wall and axilla.      CONCLUSION: Diminished hypoaeration changes at the medial basilar right  lung but with increased patchy atelectasis or infiltrate in the basilar  left lung. At least a small right apical pneumothorax is present despite  right chest tube, as discussed above. There is slightly increased  subcutaneous air in the right chest wall.         Lab Results:    Labs in chart were reviewed.  Pathology: Pending     Assessment/Plan     Active Problems:    Malignant neoplasm of upper lobe of right lung    Lung cancer      Assessment:    Condition: In stable condition.  Improving.   (Stable post-op course.  HD stable. ).     Plan:   Encourage ambulation.  Start/continue incentive spirometry.  X-rays as ordered.  (Episode of increased congestion and SOA sounds like she had a mucous plug.  Lungs are CTA now except for  diminished right upper lobe.  Continue to monitor. Will trial clamping chest tube today.  Repeat CXR in am.  Continue current treatment plan.  Pulmonary hygiene, incentive spirometer, increase activity, and pain management.  ).       Susana Wallace, APRN  [facility]  02/20/17  9:25 AM

## 2017-02-20 NOTE — PLAN OF CARE
Problem: Patient Care Overview (Adult)  Goal: Plan of Care Review  Outcome: Ongoing (interventions implemented as appropriate)    02/20/17 1428   Coping/Psychosocial Response Interventions   Plan Of Care Reviewed With patient   Outcome Evaluation   Outcome Summary/Follow up Plan VSS, chest tube clamped today, tolerating well.       Goal: Adult Individualization and Mutuality  Outcome: Ongoing (interventions implemented as appropriate)    Problem: Perioperative Period (Adult)  Goal: Signs and Symptoms of Listed Potential Problems Will be Absent or Manageable (Perioperative Period)  Outcome: Ongoing (interventions implemented as appropriate)    Problem: Fall Risk (Adult)  Goal: Absence of Falls  Outcome: Ongoing (interventions implemented as appropriate)    Problem: Chest Tube Drainage Device (Adult)  Goal: Signs and Symptoms of Listed Potential Problems Will be Absent or Manageable (Chest Tube Drainage Device)  Outcome: Ongoing (interventions implemented as appropriate)

## 2017-02-20 NOTE — PLAN OF CARE
Problem: Fall Risk (Adult)  Goal: Identify Related Risk Factors and Signs and Symptoms  Outcome: Outcome(s) achieved Date Met:  02/20/17  Goal: Absence of Falls  Outcome: Ongoing (interventions implemented as appropriate)    Problem: Chest Tube Drainage Device (Adult)  Goal: Signs and Symptoms of Listed Potential Problems Will be Absent or Manageable (Chest Tube Drainage Device)  Outcome: Ongoing (interventions implemented as appropriate)

## 2017-02-21 ENCOUNTER — APPOINTMENT (OUTPATIENT)
Dept: GENERAL RADIOLOGY | Facility: HOSPITAL | Age: 72
End: 2017-02-21

## 2017-02-21 PROCEDURE — 71010 HC CHEST PA OR AP: CPT

## 2017-02-21 PROCEDURE — 25010000002 HEPARIN (PORCINE) PER 1000 UNITS: Performed by: THORACIC SURGERY (CARDIOTHORACIC VASCULAR SURGERY)

## 2017-02-21 PROCEDURE — 99024 POSTOP FOLLOW-UP VISIT: CPT | Performed by: NURSE PRACTITIONER

## 2017-02-21 RX ADMIN — CALCIUM CARBONATE-VITAMIN D TAB 500 MG-200 UNIT 500 MG: 500-200 TAB at 09:09

## 2017-02-21 RX ADMIN — POLYETHYLENE GLYCOL 3350 17 G: 17 POWDER, FOR SOLUTION ORAL at 09:09

## 2017-02-21 RX ADMIN — DOCUSATE SODIUM 100 MG: 100 CAPSULE, LIQUID FILLED ORAL at 09:09

## 2017-02-21 RX ADMIN — Medication 1 TABLET: at 09:09

## 2017-02-21 RX ADMIN — DOCUSATE SODIUM -SENNOSIDES 2 TABLET: 50; 8.6 TABLET, COATED ORAL at 21:07

## 2017-02-21 RX ADMIN — METOPROLOL TARTRATE 25 MG: 25 TABLET ORAL at 09:09

## 2017-02-21 RX ADMIN — LEVOTHYROXINE SODIUM 75 MCG: 0.07 TABLET ORAL at 09:13

## 2017-02-21 RX ADMIN — HEPARIN SODIUM 5000 UNITS: 5000 INJECTION, SOLUTION INTRAVENOUS; SUBCUTANEOUS at 07:28

## 2017-02-21 RX ADMIN — Medication 20 MG: at 21:07

## 2017-02-21 RX ADMIN — DOCUSATE SODIUM 100 MG: 100 CAPSULE, LIQUID FILLED ORAL at 17:44

## 2017-02-21 RX ADMIN — METOPROLOL TARTRATE 25 MG: 25 TABLET ORAL at 21:07

## 2017-02-21 RX ADMIN — HYDROCODONE BITARTRATE AND ACETAMINOPHEN 1 TABLET: 7.5; 325 TABLET ORAL at 17:44

## 2017-02-21 RX ADMIN — VITAMIN B12 0.1 MG ORAL TABLET 100 MCG: 0.1 TABLET ORAL at 09:09

## 2017-02-21 RX ADMIN — HEPARIN SODIUM 5000 UNITS: 5000 INJECTION, SOLUTION INTRAVENOUS; SUBCUTANEOUS at 14:19

## 2017-02-21 RX ADMIN — HYDROCODONE BITARTRATE AND ACETAMINOPHEN 1 TABLET: 7.5; 325 TABLET ORAL at 12:32

## 2017-02-21 RX ADMIN — HYDROCODONE BITARTRATE AND ACETAMINOPHEN 1 TABLET: 7.5; 325 TABLET ORAL at 22:05

## 2017-02-21 RX ADMIN — OMEPRAZOLE 20 MG: 20 CAPSULE, DELAYED RELEASE ORAL at 07:27

## 2017-02-21 RX ADMIN — ASPIRIN 81 MG: 81 TABLET ORAL at 09:09

## 2017-02-21 RX ADMIN — HYDROCODONE BITARTRATE AND ACETAMINOPHEN 1 TABLET: 7.5; 325 TABLET ORAL at 07:30

## 2017-02-21 RX ADMIN — ESTRADIOL 0.5 MG: 0.5 TABLET ORAL at 09:11

## 2017-02-21 RX ADMIN — HEPARIN SODIUM 5000 UNITS: 5000 INJECTION, SOLUTION INTRAVENOUS; SUBCUTANEOUS at 21:07

## 2017-02-21 NOTE — PROGRESS NOTES
"    Chief Complaint: Post op  S/P:BRONCHOSCOPY, RIGHT VAT, ROBOT ASSISTED RIGHT UPPER LOBECTOMY, INTERCOSTAL NERVE BLOCK WITH CRYOA  POD # 4     Subjective:  Symptoms:  Stable.  No shortness of breath.  (No new concerns.  No worsening shortness of breath.  ).    Diet:  Adequate intake.  No nausea.    Activity level: Returning to normal.    Pain:  She complains of pain that is mild.  Pain is well controlled and requiring pain medication.        Vital Signs:  Temp:  [97.3 °F (36.3 °C)-98.4 °F (36.9 °C)] 98.4 °F (36.9 °C)  Heart Rate:  [60-80] 63  Resp:  [16] 16  BP: (102-127)/(58-79) 127/62      Intake/Output Summary (Last 24 hours) at 02/21/17 0827  Last data filed at 02/21/17 0400   Gross per 24 hour   Intake    720 ml   Output   2650 ml   Net  -1930 ml       Objective:  General Appearance:  Comfortable and in no acute distress.    Vital signs: (most recent): Blood pressure 127/62, pulse 63, temperature 98.4 °F (36.9 °C), temperature source Oral, resp. rate 16, height 66\" (167.6 cm), weight 145 lb (65.8 kg), SpO2 97 %.  Vital signs are normal.  No fever.    Output: Producing stool (BM yesterday).    Lungs:  Normal respiratory rate and normal effort.  There are decreased breath sounds (right upper lobe).    Heart: Normal rate.  Regular rhythm.  No murmur.   Abdomen: Abdomen is soft and non-distended.    Extremities: Normal range of motion.  There is no dependent edema.    Neurological: Patient is alert and oriented to person, place and time.    Skin:  Warm and dry.              Chest tube:   Site: Right, Clean, Dry and Intact  Suction: clamped  Air Leak: positive  24 Hour Total: 0    Results Review:     I reviewed the patient's new clinical results.  I reviewed the patient's new imaging results and agree with the interpretation.    Radiologist Interpretation:  Study Result   CLINICAL HISTORY: 71-year-old female 4 days postop right video-assisted  thoracoscopy with right upper lobectomy for malignancy. Follow-up " with  right chest tube. Right pneumothorax.      EXAM: PORTABLE AP ERECT CHEST DATED 02/21/2017 AT 0550 HOURS.      FINDINGS: When compared to the exam dated 02/20/2017 at 0549 hours, the  right pneumothorax is increased in magnitude with apical pleural  separation of 3.6 cm. Right chest tube remains with its tip at the  lateral right apical distribution. Crowded markings in the left lower  lung zone do not allow exclusion of some trace atelectasis or pneumonia.  Enlargement of cardiac silhouette remains. Aortic uncoiling and trace  calcification remain. There is similar to slightly further increased  subcutaneous air in the right chest wall. Monitoring lead wires are  present.      IMPRESSION:  Increased right apical pneumothorax, currently 3.6 cm  pleural separation.      Findings were communicated to the clinical service and documented in the  [employer]  Critical Result system on 2/21/2017 7:48 AM, Message  ID [id number].      This report was finalized on 2/21/2017 7:49 AM by Dr. Scot Burns MD.         Lab Results:    No results found for: WBC, HGB, HCT, PLT  No results found for: NA, K, CL, CO2, BUN, CREATININE, GLUCOSE  No results found for: CALCIUM, MG, PHOS    Pathology:  Tissue Exam   Order: [id number]   Status:  Final result   Visible to patient:  No (Not Released) Dx:  Adenocarcinoma of lung      4d ago     Final Diagnosis   1: RIGHT LUNG, UPPER LOBE, RESECTION SPECIMEN:  ADENOCARCINOMA.  NEGATIVE BRONCHIAL AND VASCULAR MARGINS.  NEGATIVE PERIHILAR LYMPH NODES (3).  NEGATIVE PERIHILAR AND STAPLED PARENCHYMAL MARGIN.     2: LYMPH NODES, R4, BIOPSY (FOUR NODES/CAT FRAGMENTS):  NO TUMOR IDENTIFIED.     3: LYMPH NODE, R11, BIOPSY (ONE NODE):  NO TUMOR IDENTIFIED.     4: LYMPH NODE, R10, BIOPSY (ONE NODE):  NO TUMOR IDENTIFIED.     5: LYMPH NODE, R7, BIOPSY (FOUR NODES/CAT FRAGMENTS):  NO TUMOR IDENTIFIED.     COMMENT: See prior report [id number] for immunophenotype of tumor.        IHC/a/THM  VAIBHAV/mini       CPT CODES:  1: 13093, 88313 x4, 92524, 65510,   2: 79586  3: 44242  4: 43786  5: 25706   Electronically signed by Chris Ji MD on 2/20/2017 at 1303              Assessment/Plan     Active Problems:    Malignant neoplasm of upper lobe of right lung    Lung cancer      Assessment:    Condition: In stable condition.   (Stable post-op course.  HD stable. ).     Plan:   Encourage ambulation.  Start/continue incentive spirometry.  X-rays as ordered.  (CXR this am showed increased right pneumothorax currently measuring 3.64 cm.  Chest tube was clamped.  Unclamped and she had a rush of air with a few air bubbles and then no further leak noted.  Continued chest tube to water seal for now.  Repeat CXR in am.  Continue current treatment plan.  Pulmonary hygiene, incentive spirometer, increase activity, and pain management.  ).       Susana Wallace, APRN  [facility]  02/21/17  8:27 AM

## 2017-02-21 NOTE — PLAN OF CARE
Problem: Patient Care Overview (Adult)  Goal: Plan of Care Review  Outcome: Ongoing (interventions implemented as appropriate)    02/21/17 0113   Coping/Psychosocial Response Interventions   Plan Of Care Reviewed With patient   Patient Care Overview   Progress improving   Outcome Evaluation   Outcome Summary/Follow up Plan chest tube clamped tolerating well possible home tommorrow         Problem: Perioperative Period (Adult)  Goal: Signs and Symptoms of Listed Potential Problems Will be Absent or Manageable (Perioperative Period)  Outcome: Ongoing (interventions implemented as appropriate)

## 2017-02-21 NOTE — PLAN OF CARE
Problem: Patient Care Overview (Adult)  Goal: Plan of Care Review  Outcome: Ongoing (interventions implemented as appropriate)    02/21/17 0113 02/21/17 1313   Coping/Psychosocial Response Interventions   Plan Of Care Reviewed With patient --    Patient Care Overview   Progress --  progress towards functional goals is fair   Outcome Evaluation   Outcome Summary/Follow up Plan --  chest tube unclamped and had air leak, pneumothorax seen on morning CXR, asymptomatic, getting Lortab Q4 hour for pain, ambulating well, ecouraging pulmonary hygiene       Goal: Adult Individualization and Mutuality  Outcome: Ongoing (interventions implemented as appropriate)    Problem: Perioperative Period (Adult)  Goal: Signs and Symptoms of Listed Potential Problems Will be Absent or Manageable (Perioperative Period)  Outcome: Ongoing (interventions implemented as appropriate)    Problem: Fall Risk (Adult)  Goal: Absence of Falls  Outcome: Ongoing (interventions implemented as appropriate)    Problem: Chest Tube Drainage Device (Adult)  Goal: Signs and Symptoms of Listed Potential Problems Will be Absent or Manageable (Chest Tube Drainage Device)  Outcome: Ongoing (interventions implemented as appropriate)

## 2017-02-22 ENCOUNTER — APPOINTMENT (OUTPATIENT)
Dept: GENERAL RADIOLOGY | Facility: HOSPITAL | Age: 72
End: 2017-02-22

## 2017-02-22 PROCEDURE — 99024 POSTOP FOLLOW-UP VISIT: CPT | Performed by: NURSE PRACTITIONER

## 2017-02-22 PROCEDURE — 71010 HC CHEST PA OR AP: CPT

## 2017-02-22 PROCEDURE — 25010000002 HEPARIN (PORCINE) PER 1000 UNITS: Performed by: THORACIC SURGERY (CARDIOTHORACIC VASCULAR SURGERY)

## 2017-02-22 RX ADMIN — DOCUSATE SODIUM 100 MG: 100 CAPSULE, LIQUID FILLED ORAL at 08:59

## 2017-02-22 RX ADMIN — Medication 1 TABLET: at 08:59

## 2017-02-22 RX ADMIN — OMEPRAZOLE 20 MG: 20 CAPSULE, DELAYED RELEASE ORAL at 07:11

## 2017-02-22 RX ADMIN — HYDROCODONE BITARTRATE AND ACETAMINOPHEN 1 TABLET: 7.5; 325 TABLET ORAL at 07:15

## 2017-02-22 RX ADMIN — HYDROCODONE BITARTRATE AND ACETAMINOPHEN 1 TABLET: 7.5; 325 TABLET ORAL at 11:23

## 2017-02-22 RX ADMIN — METOPROLOL TARTRATE 25 MG: 25 TABLET ORAL at 08:59

## 2017-02-22 RX ADMIN — POLYETHYLENE GLYCOL 3350 17 G: 17 POWDER, FOR SOLUTION ORAL at 08:59

## 2017-02-22 RX ADMIN — METOPROLOL TARTRATE 25 MG: 25 TABLET ORAL at 20:57

## 2017-02-22 RX ADMIN — CALCIUM CARBONATE-VITAMIN D TAB 500 MG-200 UNIT 500 MG: 500-200 TAB at 08:59

## 2017-02-22 RX ADMIN — HYDROCODONE BITARTRATE AND ACETAMINOPHEN 1 TABLET: 7.5; 325 TABLET ORAL at 15:30

## 2017-02-22 RX ADMIN — ESTRADIOL 0.5 MG: 0.5 TABLET ORAL at 08:59

## 2017-02-22 RX ADMIN — HEPARIN SODIUM 5000 UNITS: 5000 INJECTION, SOLUTION INTRAVENOUS; SUBCUTANEOUS at 20:57

## 2017-02-22 RX ADMIN — LEVOTHYROXINE SODIUM 75 MCG: 0.07 TABLET ORAL at 08:59

## 2017-02-22 RX ADMIN — ASPIRIN 81 MG: 81 TABLET ORAL at 08:59

## 2017-02-22 RX ADMIN — Medication 20 MG: at 20:58

## 2017-02-22 RX ADMIN — HEPARIN SODIUM 5000 UNITS: 5000 INJECTION, SOLUTION INTRAVENOUS; SUBCUTANEOUS at 15:30

## 2017-02-22 RX ADMIN — VITAMIN B12 0.1 MG ORAL TABLET 100 MCG: 0.1 TABLET ORAL at 08:59

## 2017-02-22 RX ADMIN — HYDROCODONE BITARTRATE AND ACETAMINOPHEN 1 TABLET: 7.5; 325 TABLET ORAL at 20:57

## 2017-02-22 RX ADMIN — HEPARIN SODIUM 5000 UNITS: 5000 INJECTION, SOLUTION INTRAVENOUS; SUBCUTANEOUS at 07:11

## 2017-02-22 NOTE — PLAN OF CARE
Problem: Patient Care Overview (Adult)  Goal: Plan of Care Review  Outcome: Ongoing (interventions implemented as appropriate)    02/22/17 0211 02/22/17 1234   Coping/Psychosocial Response Interventions   Plan Of Care Reviewed With patient --    Patient Care Overview   Progress progress toward functional goals as expected --    Outcome Evaluation   Outcome Summary/Follow up Plan --  chest tube clamped, pain controlled with Lortab, ambulating well, CXR in am         Problem: Perioperative Period (Adult)  Goal: Signs and Symptoms of Listed Potential Problems Will be Absent or Manageable (Perioperative Period)  Outcome: Ongoing (interventions implemented as appropriate)    Problem: Fall Risk (Adult)  Goal: Absence of Falls  Outcome: Ongoing (interventions implemented as appropriate)    Problem: Chest Tube Drainage Device (Adult)  Goal: Signs and Symptoms of Listed Potential Problems Will be Absent or Manageable (Chest Tube Drainage Device)  Outcome: Ongoing (interventions implemented as appropriate)

## 2017-02-22 NOTE — PLAN OF CARE
Problem: Patient Care Overview (Adult)  Goal: Plan of Care Review  Outcome: Ongoing (interventions implemented as appropriate)    02/22/17 0211   Coping/Psychosocial Response Interventions   Plan Of Care Reviewed With patient   Patient Care Overview   Progress progress toward functional goals as expected   Outcome Evaluation   Outcome Summary/Follow up Plan chest tube remains unclamped pain controlled with pain med ambulated with family         Problem: Perioperative Period (Adult)  Goal: Signs and Symptoms of Listed Potential Problems Will be Absent or Manageable (Perioperative Period)  Outcome: Ongoing (interventions implemented as appropriate)    Problem: Chest Tube Drainage Device (Adult)  Goal: Signs and Symptoms of Listed Potential Problems Will be Absent or Manageable (Chest Tube Drainage Device)  Outcome: Ongoing (interventions implemented as appropriate)

## 2017-02-22 NOTE — PROGRESS NOTES
"    Chief Complaint: Post op  S/P:BRONCHOSCOPY, RIGHT VAT, ROBOT ASSISTED RIGHT UPPER LOBECTOMY, INTERCOSTAL NERVE BLOCK WITH CRYOA  POD # 5    Subjective:  Symptoms:  (No new concerns.  Pain controlled.  No SOA.  ).        Vital Signs:  Temp:  [97.5 °F (36.4 °C)-99.6 °F (37.6 °C)] 99.2 °F (37.3 °C)  Heart Rate:  [61-72] 64  Resp:  [16-18] 18  BP: (109-125)/(62-83) 117/83      Intake/Output Summary (Last 24 hours) at 02/22/17 1131  Last data filed at 02/22/17 0724   Gross per 24 hour   Intake   1300 ml   Output   1190 ml   Net    110 ml       Objective:  General Appearance:  Comfortable and in no acute distress (sitting up in chair).    Vital signs: (most recent): Blood pressure 117/83, pulse 64, temperature 99.2 °F (37.3 °C), temperature source Oral, resp. rate 18, height 66\" (167.6 cm), weight 145 lb (65.8 kg), SpO2 94 %.  No fever.    Lungs:  Normal respiratory rate and normal effort.  Breath sounds clear to auscultation.    Heart: Normal rate.  Regular rhythm.  No murmur.   Abdomen: Abdomen is soft and non-distended.    Extremities: Normal range of motion.  There is no dependent edema.    Neurological: Patient is alert and oriented to person, place and time.    Skin:  Warm and dry.              Chest tube:   Site: Right, Clean, Dry and Intact  Suction: waterseal  Air Leak: negative  24 Hour Total: 190    Results Review:     I reviewed the patient's new clinical results.  I reviewed the patient's new imaging results and agree with the interpretation.    Radiologist Interpretation:  Study Result   CLINICAL HISTORY: 71-year-old female 5 days postop right video-assisted  thoracoscopy with right upper lobectomy for malignancy. Follow-up with  right chest tube.      PORTABLE AP ERECT CHEST DATED 02/22/2017 AT 0551 HOURS      FINDINGS: Comparison is with multiple recent prior chest radiographs,  particularly including the studies of 02/20/2017 at 0549 hours and  02/21/2017 at 0550 hours. There is again a pleural " interface at the  right upper lung zone with a 2nd apparent pleural interface only 2-3 mm  below the superolateral right apex suggesting there is a tiny right  apical pneumothorax that may or may not have superimposed area of  somewhat greater residual right pneumothorax, or the lower pleural  interface may simply reflect lobation or lobulation of remaining lung  margin. Tip of the right chest tube remains at the lateral right apical  distribution. Subcutaneous air in the right chest wall remains. Minimal  patchy to strandy atelectasis at the left base is again not excluded.  The heart remains borderline to mildly enlarged caliber. Aortic  uncoiling and calcification are again demonstrated. Monitoring lead  wires are present. Surgical clips at the mediastinum and right lower  neck distribution are again demonstrated.      CONCLUSION: At least a tiny subcentimeter right apical pneumothorax is  seen and I believe there may be a superimposed larger component that is  slightly reduced from the study of 02/21/2017 with current maximal  possible pleural separation at the right apex now 2.8 cm compared to  previous 3.6 cm. See above discussion.         Lab Results:    Labs in chart were reviewed.     Assessment/Plan     Active Problems:    Malignant neoplasm of upper lobe of right lung    Lung cancer      Assessment:    Condition: In stable condition.  Improving.   (Stable post-op course.  HD stable. ).     Plan:   Encourage ambulation.  Start/continue incentive spirometry.  X-rays as ordered.  (Clamped chest tube today.  Repeat CXR in am.  Continue current treatment plan.  Pulmonary hygiene, incentive spirometer, increase activity, and pain management.  ).       Susana Wallace, APRN  [facility]  02/22/17  11:31 AM

## 2017-02-23 ENCOUNTER — APPOINTMENT (OUTPATIENT)
Dept: GENERAL RADIOLOGY | Facility: HOSPITAL | Age: 72
End: 2017-02-23

## 2017-02-23 VITALS
TEMPERATURE: 97.9 F | OXYGEN SATURATION: 95 % | HEART RATE: 62 BPM | WEIGHT: 145 LBS | RESPIRATION RATE: 18 BRPM | SYSTOLIC BLOOD PRESSURE: 116 MMHG | HEIGHT: 66 IN | DIASTOLIC BLOOD PRESSURE: 87 MMHG | BODY MASS INDEX: 23.3 KG/M2

## 2017-02-23 DIAGNOSIS — C34.11 MALIGNANT NEOPLASM OF UPPER LOBE OF RIGHT LUNG (HCC): Primary | ICD-10-CM

## 2017-02-23 PROCEDURE — 71010 HC CHEST PA OR AP: CPT

## 2017-02-23 PROCEDURE — 25010000002 HEPARIN (PORCINE) PER 1000 UNITS: Performed by: THORACIC SURGERY (CARDIOTHORACIC VASCULAR SURGERY)

## 2017-02-23 RX ORDER — HYDROCODONE BITARTRATE AND ACETAMINOPHEN 7.5; 325 MG/1; MG/1
1 TABLET ORAL EVERY 6 HOURS PRN
Qty: 30 TABLET | Refills: 0 | Status: SHIPPED | OUTPATIENT
Start: 2017-02-23 | End: 2017-02-27

## 2017-02-23 RX ADMIN — HYDROCODONE BITARTRATE AND ACETAMINOPHEN 1 TABLET: 7.5; 325 TABLET ORAL at 03:46

## 2017-02-23 RX ADMIN — Medication 1 TABLET: at 08:27

## 2017-02-23 RX ADMIN — OMEPRAZOLE 20 MG: 20 CAPSULE, DELAYED RELEASE ORAL at 06:18

## 2017-02-23 RX ADMIN — METOPROLOL TARTRATE 25 MG: 25 TABLET ORAL at 08:27

## 2017-02-23 RX ADMIN — LEVOTHYROXINE SODIUM 75 MCG: 0.07 TABLET ORAL at 08:28

## 2017-02-23 RX ADMIN — CALCIUM CARBONATE-VITAMIN D TAB 500 MG-200 UNIT 500 MG: 500-200 TAB at 08:27

## 2017-02-23 RX ADMIN — POLYETHYLENE GLYCOL 3350 17 G: 17 POWDER, FOR SOLUTION ORAL at 08:27

## 2017-02-23 RX ADMIN — ESTRADIOL 0.5 MG: 0.5 TABLET ORAL at 08:28

## 2017-02-23 RX ADMIN — HYDROCODONE BITARTRATE AND ACETAMINOPHEN 1 TABLET: 7.5; 325 TABLET ORAL at 08:27

## 2017-02-23 RX ADMIN — VITAMIN B12 0.1 MG ORAL TABLET 100 MCG: 0.1 TABLET ORAL at 08:27

## 2017-02-23 RX ADMIN — ASPIRIN 81 MG: 81 TABLET ORAL at 08:27

## 2017-02-23 RX ADMIN — HEPARIN SODIUM 5000 UNITS: 5000 INJECTION, SOLUTION INTRAVENOUS; SUBCUTANEOUS at 06:18

## 2017-02-23 NOTE — PROGRESS NOTES
Continued Stay Note  [facility]     Patient Name: Jerilyn Martinez  MRN: [id number]  Today's Date: 2/23/2017    Admit Date: 2/17/2017          Discharge Plan       02/23/17 1122    Case Management/Social Work Plan    Plan Home     Patient/Family In Agreement With Plan yes    Additional Comments Noted orders for discharge. Followed up with patient and no home needs. Patient plans home today with family assist. She denies need for any equipment or HH.               Discharge Codes     None        Expected Discharge Date and Time     Expected Discharge Date Expected Discharge Time    Feb 23, 2017             Mauricio Ramos RN

## 2017-02-23 NOTE — DISCHARGE INSTR - LAB
You have a scheduled one week follow up with your primary care doctor Dr. Felisha Baez on Thursday March 2nd, 2017 at 11:05 am. If you need to change the appointment day or time for any reason give their office a call as soon as possible.

## 2017-02-23 NOTE — PLAN OF CARE
Problem: Patient Care Overview (Adult)  Goal: Plan of Care Review  Outcome: Ongoing (interventions implemented as appropriate)    02/23/17 1101   Coping/Psychosocial Response Interventions   Plan Of Care Reviewed With patient   Patient Care Overview   Progress improving   Outcome Evaluation   Outcome Summary/Follow up Plan patient vss. pain well controlled with Norco. patient chest tube removed today by NP. patient being discharged today.         Problem: Perioperative Period (Adult)  Goal: Signs and Symptoms of Listed Potential Problems Will be Absent or Manageable (Perioperative Period)  Outcome: Ongoing (interventions implemented as appropriate)    02/23/17 1101   Perioperative Period   Problems Assessed (Perioperative Period) all   Problems Present (Perioperative Period) pain         Problem: Fall Risk (Adult)  Goal: Absence of Falls  Outcome: Ongoing (interventions implemented as appropriate)    02/23/17 1101   Fall Risk (Adult)   Absence of Falls making progress toward outcome         Problem: Chest Tube Drainage Device (Adult)  Goal: Signs and Symptoms of Listed Potential Problems Will be Absent or Manageable (Chest Tube Drainage Device)  Outcome: Ongoing (interventions implemented as appropriate)    02/23/17 1101   Chest Tube Drainage Device   Problems Assessed (Chest Tube Drainage Device) all   Problems Present (Chest Tube Drainage Device) pain

## 2017-02-23 NOTE — PLAN OF CARE
Problem: Patient Care Overview (Adult)  Goal: Plan of Care Review  Outcome: Ongoing (interventions implemented as appropriate)    02/23/17 0000   Coping/Psychosocial Response Interventions   Plan Of Care Reviewed With patient   Patient Care Overview   Progress improving   Outcome Evaluation   Outcome Summary/Follow up Plan VSS. Tolerating CT clamped. Pain controlled with Lortab. Ambulated around nurses satation x3. Plan for chest xay in am and possible DC home. Will continue to monitor.        Goal: Adult Individualization and Mutuality  Outcome: Ongoing (interventions implemented as appropriate)  Goal: Discharge Needs Assessment  Outcome: Ongoing (interventions implemented as appropriate)    Problem: Perioperative Period (Adult)  Goal: Signs and Symptoms of Listed Potential Problems Will be Absent or Manageable (Perioperative Period)  Outcome: Ongoing (interventions implemented as appropriate)    Problem: Fall Risk (Adult)  Goal: Absence of Falls  Outcome: Ongoing (interventions implemented as appropriate)    Problem: Chest Tube Drainage Device (Adult)  Goal: Signs and Symptoms of Listed Potential Problems Will be Absent or Manageable (Chest Tube Drainage Device)  Outcome: Ongoing (interventions implemented as appropriate)

## 2017-03-08 ENCOUNTER — OFFICE VISIT (OUTPATIENT)
Dept: SURGERY | Facility: CLINIC | Age: 72
End: 2017-03-08

## 2017-03-08 ENCOUNTER — HOSPITAL ENCOUNTER (OUTPATIENT)
Dept: GENERAL RADIOLOGY | Facility: HOSPITAL | Age: 72
Discharge: HOME | End: 2017-03-08
Attending: THORACIC SURGERY (CARDIOTHORACIC VASCULAR SURGERY) | Admitting: THORACIC SURGERY (CARDIOTHORACIC VASCULAR SURGERY)

## 2017-03-08 VITALS
HEIGHT: 66 IN | HEART RATE: 72 BPM | SYSTOLIC BLOOD PRESSURE: 122 MMHG | RESPIRATION RATE: 16 BRPM | OXYGEN SATURATION: 98 % | BODY MASS INDEX: 23.3 KG/M2 | WEIGHT: 145 LBS | DIASTOLIC BLOOD PRESSURE: 64 MMHG

## 2017-03-08 DIAGNOSIS — Z09 FOLLOW-UP EXAMINATION FOLLOWING SURGERY: ICD-10-CM

## 2017-03-08 DIAGNOSIS — C34.11 MALIGNANT NEOPLASM OF UPPER LOBE OF RIGHT LUNG (HCC): ICD-10-CM

## 2017-03-08 DIAGNOSIS — C34.11 MALIGNANT NEOPLASM OF UPPER LOBE OF RIGHT LUNG (HCC): Primary | ICD-10-CM

## 2017-03-08 PROBLEM — C34.90 LUNG CANCER: Status: RESOLVED | Noted: 2017-02-17 | Resolved: 2017-03-08

## 2017-03-08 PROCEDURE — 99024 POSTOP FOLLOW-UP VISIT: CPT | Performed by: THORACIC SURGERY (CARDIOTHORACIC VASCULAR SURGERY)

## 2017-03-08 PROCEDURE — 71020 HC CHEST PA AND LATERAL: CPT

## 2017-03-08 RX ORDER — SIMVASTATIN 20 MG
20 TABLET ORAL NIGHTLY
COMMUNITY
Start: 2017-03-06

## 2017-03-08 RX ORDER — HYDROCODONE BITARTRATE AND ACETAMINOPHEN 7.5; 325 MG/1; MG/1
TABLET ORAL
COMMUNITY
Start: 2017-03-02 | End: 2017-10-11

## 2017-03-08 NOTE — PROGRESS NOTES
Subjective   Patient ID: Jerilyn Martinez is a 71 y.o. female who presents to the office today for a 1st post op visit with chest xray.    History of Present Illness  Dear Colleagues,   Jerilyn Martinez is here today for her first follow-up visit following a robot-assisted right upper lobectomy performed February 17, 2017 for a stage I adenocarcinoma of the lung.  Since discharge from the hospital she has done very well.  She has some numbness in her chest wall.  She has some pain particularly at night.  She reports shortness of breath with moderate exertion.  She has no fever chills or night sweats.  She has no cough or hemoptysis.  There is no pleuritic pain.  Her appetite is back to normal.    The following portions of the patient's history were reviewed and updated as appropriate: allergies, current medications, past family history, past medical history, past social history, past surgical history and problem list.  Review of Systems   Constitution: Negative.   HENT: Negative.    Eyes: Negative.    Cardiovascular: Negative.    Respiratory:        Right sided chest wall pain.   Endocrine: Negative.    Hematologic/Lymphatic: Negative.    Skin: Negative.    Musculoskeletal: Negative.    Gastrointestinal: Negative.    Genitourinary: Negative.    Neurological: Negative.    Psychiatric/Behavioral: Negative.         Objective   Physical Exam   Constitutional: She is oriented to person, place, and time. Vital signs are normal. She appears well-developed.   HENT:   Head: Normocephalic and atraumatic.   Neck: Normal range of motion. Neck supple.   Cardiovascular: Normal rate, regular rhythm, normal heart sounds and intact distal pulses.    No murmur heard.  Pulmonary/Chest: Effort normal and breath sounds normal. She has no wheezes. She has no rhonchi. She has no rales. She exhibits no tenderness.   Anterior most port site where the chest tube was placed is slow to heal.  There is no gross infection.  It was cleaned with  peroxide and painted with Betadine.  The other incisions are well-healed.  There are no subcutaneous masses or nodules.   Abdominal: Soft. There is no tenderness.   Musculoskeletal: Normal range of motion. She exhibits no edema or tenderness.   Neurological: She is alert and oriented to person, place, and time. She has normal strength.   Skin: Skin is warm and dry. No rash noted. No cyanosis or erythema.   Psychiatric: She has a normal mood and affect. Her behavior is normal.       Assessment/Plan   Independent Review of Radiographic Studies:    Chest x-ray performed today was independently reviewed.  Expected postoperative changes on the right.  No pneumothorax.  No pleural effusion.  Left lung is clear      Assessment/Plan: Patient has made a good recovery from her surgery.  I have given her wound care instructions.  I have released her to drive her car into resume all normal activities.  We have scheduled her an appointment in the survivorship clinic.  She will return to see me in 4 weeks with a repeat chest x-ray.  I will keep you informed of her progress.  Thank you for allowing me to participate in the care of Mrs. Dorado.      Diagnoses and all orders for this visit:    Malignant neoplasm of upper lobe of right lung  -     XR Chest 2 View; Future  -     Ambulatory Referral to Multi-Disciplinary Clinic    Follow-up examination following surgery  -     XR Chest 2 View; Future

## 2017-03-09 ENCOUNTER — TELEPHONE (OUTPATIENT)
Dept: OTHER | Facility: HOSPITAL | Age: 72
End: 2017-03-09

## 2017-03-09 NOTE — TELEPHONE ENCOUNTER
Received call from patient RE: open referral to survivorship clinic from Dr Brizuela. I spoke to the patient and reviewed purpose and goals of survivorship visit as well as what to expect. Patient agreeable. Scheduled for survivorship visit 3/16/2017 at 1:00pm. Reminder card and contact info mailed to patient.

## 2017-03-16 ENCOUNTER — OFFICE VISIT (OUTPATIENT)
Dept: OTHER | Facility: HOSPITAL | Age: 72
End: 2017-03-16

## 2017-03-16 VITALS
OXYGEN SATURATION: 99 % | HEIGHT: 66 IN | BODY MASS INDEX: 23.63 KG/M2 | DIASTOLIC BLOOD PRESSURE: 96 MMHG | SYSTOLIC BLOOD PRESSURE: 135 MMHG | WEIGHT: 147 LBS | HEART RATE: 99 BPM | TEMPERATURE: 97 F | RESPIRATION RATE: 20 BRPM

## 2017-03-16 DIAGNOSIS — C34.11 MALIGNANT NEOPLASM OF UPPER LOBE OF RIGHT LUNG (HCC): Primary | ICD-10-CM

## 2017-03-16 PROCEDURE — 99215 OFFICE O/P EST HI 40 MIN: CPT | Performed by: NURSE PRACTITIONER

## 2017-03-16 NOTE — PROGRESS NOTES
[facility] CANCER SURVIVORSHIP VISIT NOTE    Jerilyn Martinez is a pleasant 71 y.o.   female seen today at the request of Dr Brizuela in our Cancer Survivorship Clinic. The patient has a history of Adenocarcinoma (Stage IA) of the Lung, right upper lobe. Here today to review survivorship care plan.    TREATMENT HISTORY:   Initially patient was seen by PCP Dr Baez for complaints of cough and chest wall pain. Imaging included abnormal chest x-ray followed by chest CT also abnormal noting 2cm right upper lobe mass. On 2/17/2017 she underwent robot-assisted right upper lobectomy with mediastinal lymph node dissection. A 1.9cm tumor was identified, clear margins and 0 of 13 lymph nodes positive (adenocarcinoma stage IA). This was negative for EGFR mutation and negative for rearrangement involving ALK gene.     Allergies as of 03/16/2017 - Chris as Reviewed 03/16/2017   Allergen Reaction Noted   • Codeine Shortness Of Breath 07/15/2013       MEDICATIONS:  I have reviewed the medication list with the patient and I updated it in the electronic medical record. Medication dosages and frequencies were confirmed to be accurate with the patient.      Review of Systems   Constitutional: Positive for appetite change. Negative for fever and unexpected weight change.   HENT: Negative for nosebleeds, sore throat and trouble swallowing.    Respiratory: Positive for shortness of breath. Negative for cough and chest tightness.         SOB on exertion   Cardiovascular: Negative for chest pain, palpitations and leg swelling.   Gastrointestinal: Negative for abdominal distention, blood in stool, constipation, diarrhea and nausea.   Genitourinary: Negative for difficulty urinating, dysuria and hematuria.   Skin: Positive for wound. Negative for rash.        Healing right lateral chest surgical wound   Neurological: Negative for numbness and headaches.   Hematological: Does not bruise/bleed easily.  "  Psychiatric/Behavioral: Positive for decreased concentration. Negative for dysphoric mood and sleep disturbance. The patient is not nervous/anxious.          Visit Vitals   • /96   • Pulse 99   • Temp 97 °F (36.1 °C) (Oral)   • Resp 20   • Ht 66\" (167.6 cm)   • Wt 147 lb (66.7 kg)   • SpO2 99%  Comment: room air   • BMI 23.73 kg/m2       Physical Exam   Constitutional: She is oriented to person, place, and time. She appears well-developed and well-nourished. She is cooperative.   HENT:   Head: Normocephalic and atraumatic.   Mouth/Throat: Mucous membranes are normal.   Cardiovascular: Normal rate and regular rhythm.    Pulses:       Radial pulses are 2+ on the right side, and 2+ on the left side.   Pulmonary/Chest: No accessory muscle usage. No respiratory distress. She has no wheezes. She has no rhonchi. She has no rales.   Abdominal: Soft. Normal appearance. There is no tenderness.   Neurological: She is alert and oriented to person, place, and time.   Skin: Skin is warm and dry.        Psychiatric: She has a normal mood and affect. Her speech is normal and behavior is normal. Judgment and thought content normal.         SURVIVORSHIP DISCUSSION HELD TODAY:   Primary patient goal(s): increase activity tolerance     Management of disease and treatment related effects: Jerilyn states overall she has been feeling well. She has been caring for the healing surgical wound from chest tube insertion which she states she thinks became infected. Today it looks like it is resolving. She does note having some exertional dyspnea when walking with friends and at times taking breaks. Currently she sleeps propped on two pillows and this is new since surgery. We discussed that with continued and sustained activity this should improve over time. We discussed management of dyspnea including positioning, diaphragmatic breathing and pursed lip breathing. She continues to use her incentive spirometer at home. She also affirms some " concentration and memory problems, and we reviewed strategies for managing this and discussed that this too should gradually resolve with time.      Psychosocial and spiritual: Jerilyn reports having a strong network of social support. She is active in her condominium community. She has recently retired from her position as an  at a law firm and states she has received great support from her colleagues there as well. She denies distress, depression or dysphoric mood and explains she feels grateful that her cancer was identified early and was treatable though surgical intervention. She denies spiritual distress. She states she is looking forward to feeling back to normal and enjoying life.             Maintaining personal wellness: During her workup some metabolic activity by PET CT was noted in the nasopharynx. This was cleared after office exam with ENT/Dr Duran. She is scheduled for follow up in 6 months. Jerilyn is maintaining a healthy BMI and active daily. She is a former smoker, having quit in 1987 and continues to be tobacco free. She continues her screenings for colon cancer. We discussed that for women 70 and over, it is ok to stop PAP tests if last three have been normal and no abnormals in 10 years. She continues annual pelvic exams and mammography with Dr Shah.      After review of the Survivorship Treatment Summary & Care Plan, the patient verbalized understanding of reccommendations for follow-up. As outlined in the care plan, they were advised to continue with follow-up care in accordance with the NCCN surveillance guidelines while transitioning back to their primary care physician for continued general preventive and healthcare needs. We discussed the importance of healthy eating, exercise and weight management. We reviewed current guidelines for routine screening of other cancers.     A copy of the Survivorship Treatment Summary & Care Plan for Ms. Martinez (see below) was provided to her  and forwarded to the providers identified on the care team.    50 minutes out of 60 minutes face-to-face spent counseling patient extensively on treatment summary, late effects of treatment, surveillance for recurrence, preventative screening for other cancers, management of anxiety and self care strategies, exercise and activity tolerance.       This Cancer Care Plan will facilitate cancer care following active treatment. It may include important contact information, a treatment summary, recommendations for follow-up care testing, a directory of support services and resources, and other information. [1]                       Care Plan for Lung Cancer            Prepared by:     ADELITA Flores DNP on 3/15/2017 at [facility]                                   General Information         Patient Name CLIFFORD ROWAN   Medical record number [id number]   Phone (home) [phone]   Date of birth 1945   Age at diagnosis 71   Gender Female   Support contact ADELITA FLORES, [phone]                       Care team    Medical oncologist     Radiation oncologist PIA DELA CRUZ MD, [phone]   Primary care physician SWEETIE AGUIRRE MD, [phone]   Nurse/nurse practitioner    Mental health/    Coordination of care    Gynecologist JOHN NGUYỄN MD, [phone]             Background Information           Diagnosis Symptoms/signs, (Symptoms of cough with chest wall pain; history of mother, father and sister with lung cancer)   Tobacco use-past Yes   Tobacco use current No   Other health concerns Anemia, COPD, Depression, GERD (gastroesophageal reflux disease);    Location UPPER LOBE, LUNG, Right origin   Relevant preoperative findings 2cm mass in right upper lobe by chest CT   Definitive surgery On 2/17/2017 of Robot-assisted right upper lobectomy WITH mediastinal lymph node dissection via Lobectomy, Robotics   Notable surgical findings 1.9cm tumor identified,  clear margins, 13 lymph nodes removed -no positive lymph nodes   Completeness of resection R0 (No residual tumor)   TNM stage T1a,  N0,  M0: I   Pathologic stage IA   Final pathologic details adenocarcinoma   Molecular or genetic studies No EGFR mutation, negative for rearrangement involving the ALK gene   Comments Former smoker, quit in 1987    Some metabolic activity in nasopharynx by PET CT, evaluated by ENT                  Treatment Plan & Summary           Patient's height 66 in                   Pre-Treatment Post-Treatment   Patient's weight 151 lb 145 lb   Patient's BSA 1.79 m² 1.75 m²   Patient's BMI 24.4 23.4               Treatment on clinical trial No                  Non-chemotherapy agents Route Purpose/goal Comments                   Follow-Up Care            UPON SCREENING, THE PATIENT HAS BEEN DETERMINED TO HAVE THE FOLLOWING ISSUE(S):             Patients - Please consult your health care provider for medical advice specific to you before using any medications, supplements, or other products, and before beginning any lifestyle program.                  Chief patient concern Fatigue              Needs/Concerns Suggested intervention(s)   Concerns about risk of recurrence Patients need to know that these feelings are normal, and they won’t cause the cancer to come back.    • Referral for cognitive behavioral therapy or counseling    • Referral to support group, e.g. American Cancer Society (www.cancer.org, 342.253.1697), Cancer Support Community (www.wellnessandcancer.org, 670.114.2725)   Screening for other cancers Lung cancer diagnosis does not change screening practices for other cancers.    • If over 50 years: Colonoscopy every 10 years if first one normal    • If over 50 years (male): PSA  and digital rectal exam annually (for high risk  men and those with a family history begin at age 40)    • If over 30 years (female): Pap smear every 3 years as long as the last 3 were normal,  every year if abnormal    • If over 25 years (female): annual clinical breast exam and mammography   Memory problems and/or confusion Patients should know that 25% of cancer patients have cognitive dysfunction after treatment and it usually gets better over time    • Rule out depression, sleep disturbance   Wellness (e.g., diet, exercise, smoking cessation)   • Maintenance of body weight as weight gain is associated with recurrence    • Regular physical activity (gradually working up to a goal of walking 20 minutes daily as tolerated)    • Continued avoidance of smoking and second hand smoke    • Limitation of alcohol intake to less than 1 drink, 2-3x per week   Dyspnea (Shortness of Breath) - Energy conservation: pace activities take frequent rests, use assistive devices and/or break activities into smaller tasks to help decrease shortness of breath  ·Use breathing positions to reduce shortness of breath (handout)    ·Practice breathing techniques such as pursed lip or diaphragmatic breathing  ·Identify your best “breathing time” of the day, and reserve strenuous activities for this period.   ·Avoid triggers such as temperature extremes and exposure to air pollution, pollen, cigarette smoke, chemical fragrances, and dust.                Surveillance When/How often Coordinating provider   Medical oncology visits every 3-4 months during years 1-2, then every 6 months during years 3-5, then annually. PIA DELA CRUZ MD   Imaging: Chest CT with contrast Every 6 months for the first two years. PIA DEL ACRUZ MD   Imaging: Chest CT non-contrast two or more years after diagnosis and then annually. PIA DELA CRUZ MD   Colonoscopy Every 10 years if last colonoscopy was normal (no polyps) SWEETIE AGUIRRE MD   Mammogram Annual clinical breast exam and mammography JOHN NGUYỄN MD   Pap smear Women age 70 or older who have had three normal PAP tests in a row and no abnormal PAP tests in the past ten years, may choose to stop  having PAP tests. Continue with annual pelvic exams JOHN NGUYỄN MD              Potential late effects of treatment Chest pain, inflammation of the lungs, thickening of the lining of the lungs, a change in how well the lungs work, difficulty breathing.   Symptoms to watch for Coughing up blood, continued hoarseness, persistant cough, recurring infections, loss of appetite, bone pain, headache, dizziness, swelling in the face, arms, or neck, lumps in the neck or collar bone region.               Comments Chest X-Ray and follow-up with Dr Brizuela 4/5/2017    Continue all standard non-cancer related healthcare with your primary care provider. Any new, unusual, and/or persistent symptoms should be brought to the attention of your provider               Nathalia Richard DNP, APRN, AGCNS-BC, OCN  Oncology Survivorship Clinical Nurse Specialist  [facility]

## 2017-04-05 ENCOUNTER — HOSPITAL ENCOUNTER (OUTPATIENT)
Dept: GENERAL RADIOLOGY | Facility: HOSPITAL | Age: 72
Discharge: HOME | End: 2017-04-05
Attending: THORACIC SURGERY (CARDIOTHORACIC VASCULAR SURGERY) | Admitting: THORACIC SURGERY (CARDIOTHORACIC VASCULAR SURGERY)

## 2017-04-05 ENCOUNTER — OFFICE VISIT (OUTPATIENT)
Dept: SURGERY | Facility: CLINIC | Age: 72
End: 2017-04-05

## 2017-04-05 VITALS
HEART RATE: 76 BPM | DIASTOLIC BLOOD PRESSURE: 85 MMHG | OXYGEN SATURATION: 100 % | WEIGHT: 146.2 LBS | HEIGHT: 66 IN | TEMPERATURE: 97.2 F | SYSTOLIC BLOOD PRESSURE: 121 MMHG | RESPIRATION RATE: 18 BRPM | BODY MASS INDEX: 23.5 KG/M2

## 2017-04-05 DIAGNOSIS — Z09 FOLLOW-UP EXAMINATION FOLLOWING SURGERY: ICD-10-CM

## 2017-04-05 DIAGNOSIS — C34.11 MALIGNANT NEOPLASM OF UPPER LOBE OF RIGHT LUNG (HCC): Primary | ICD-10-CM

## 2017-04-05 DIAGNOSIS — C34.11 MALIGNANT NEOPLASM OF UPPER LOBE OF RIGHT LUNG (HCC): ICD-10-CM

## 2017-04-05 PROCEDURE — 71020 HC CHEST PA AND LATERAL: CPT

## 2017-04-05 PROCEDURE — 99024 POSTOP FOLLOW-UP VISIT: CPT | Performed by: THORACIC SURGERY (CARDIOTHORACIC VASCULAR SURGERY)

## 2017-04-05 NOTE — PROGRESS NOTES
Subjective   Patient ID: Jerilyn Martinez is a 72 y.o. female is here today for follow-up.    History of Present Illness  Dear Colleagues,   Jerilyn Martinez is here today for further follow-up of a robot-assisted right upper lobectomy performed February 17, 2017 for a T1a N0 M0 adenocarcinoma of the right upper lobe of the lung.  Since her last visit here she has steadily improved.  She has pretty well resumed all of her normal activities.  She is experiencing no significant shortness of breath.  She has an intermittent dry cough.  She has had no hemoptysis.  She reports no fever chills or night sweats.  She has no chest wall discomfort.  The area of the chest tube insertion is now completely healed without signs of infection.    The following portions of the patient's history were reviewed and updated as appropriate: allergies, current medications, past family history, past medical history, past social history, past surgical history and problem list.  Review of Systems   Constitution: Negative.   HENT: Negative.    Eyes: Negative.    Cardiovascular: Negative.    Respiratory: Negative.    Endocrine: Negative.    Hematologic/Lymphatic: Negative.    Skin: Negative.    Musculoskeletal: Negative.    Gastrointestinal: Negative.    Genitourinary: Negative.    Neurological: Negative.    Psychiatric/Behavioral: Negative.         Objective   Physical Exam   Constitutional: She is oriented to person, place, and time. Vital signs are normal. She appears well-developed.   HENT:   Head: Normocephalic and atraumatic.   Neck: Normal range of motion. Neck supple.   Cardiovascular: Normal rate, regular rhythm, normal heart sounds and intact distal pulses.    No murmur heard.  Pulmonary/Chest: Effort normal and breath sounds normal. She has no wheezes. She has no rhonchi. She has no rales. She exhibits no tenderness.   All incisions are clean dry and well healed.  There are no subcutaneous masses or nodules.  Chest wall is stable.    Abdominal: Soft. There is no tenderness.   Musculoskeletal: Normal range of motion. She exhibits no edema or tenderness.   Neurological: She is alert and oriented to person, place, and time. She has normal strength.   Skin: Skin is warm and dry. No rash noted. No cyanosis or erythema.   Psychiatric: She has a normal mood and affect. Her behavior is normal.       Assessment/Plan   Independent Review of Radiographic Studies:    Chest x-ray performed today was independently reviewed and compared to previous x-rays.  Both lungs are fully expanded.  There are no effusions infiltrates or masses.  There is no pneumothorax.    Assessment/Plan: Mrs. Martinez has made a good recovery from her surgery.  She can continue all normal activities without restrictions.  We will begin our routine surveillance.  She will return to see me in 6 months with a CT of the chest with contrast.  I will keep you informed of her progress.  Thank you for allowing me to participate in the care of Mrs. Martinez.      Diagnoses and all orders for this visit:    Malignant neoplasm of upper lobe of right lung  -     CT Chest With Contrast; Future    Follow-up examination following surgery

## 2017-10-04 ENCOUNTER — HOSPITAL ENCOUNTER (OUTPATIENT)
Dept: CT IMAGING | Facility: HOSPITAL | Age: 72
Discharge: HOME | End: 2017-10-04
Attending: THORACIC SURGERY (CARDIOTHORACIC VASCULAR SURGERY) | Admitting: THORACIC SURGERY (CARDIOTHORACIC VASCULAR SURGERY)

## 2017-10-04 DIAGNOSIS — C34.11 MALIGNANT NEOPLASM OF UPPER LOBE OF RIGHT LUNG (HCC): ICD-10-CM

## 2017-10-04 LAB — CREAT BLDA-MCNC: 0.7 MG/DL (ref 0.6–1.3)

## 2017-10-04 PROCEDURE — 0 IOPAMIDOL 61 % SOLUTION: Performed by: THORACIC SURGERY (CARDIOTHORACIC VASCULAR SURGERY)

## 2017-10-04 PROCEDURE — 82565 ASSAY OF CREATININE: CPT

## 2017-10-04 PROCEDURE — 71260 CT THORAX DX C+: CPT

## 2017-10-04 RX ADMIN — IOPAMIDOL 75 ML: 612 INJECTION, SOLUTION INTRAVENOUS at 10:30

## 2017-10-11 ENCOUNTER — OFFICE VISIT (OUTPATIENT)
Dept: SURGERY | Facility: CLINIC | Age: 72
End: 2017-10-11

## 2017-10-11 VITALS
HEART RATE: 102 BPM | HEIGHT: 66 IN | OXYGEN SATURATION: 100 % | WEIGHT: 144.8 LBS | SYSTOLIC BLOOD PRESSURE: 128 MMHG | DIASTOLIC BLOOD PRESSURE: 86 MMHG | BODY MASS INDEX: 23.27 KG/M2

## 2017-10-11 DIAGNOSIS — Z85.118 HISTORY OF LUNG CANCER: Primary | ICD-10-CM

## 2017-10-11 PROCEDURE — 99213 OFFICE O/P EST LOW 20 MIN: CPT | Performed by: NURSE PRACTITIONER

## 2017-10-11 NOTE — PROGRESS NOTES
Patient ID: Jerilyn Martinez is a 72 y.o. female is here today for follow-up.    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  Since her last visit here, she has been doing well.  She has returned to her normal activities without any problems.  Her only concern is continued right sided chest wall numbness.  The numbness has improved since surgery and has not worsen.  She denies any complaints of fever, chills, cough, hemoptysis, shortness of air, dyspnea with exertion, night sweats, hoarseness, or unintentional weight loss.      Patient Active Problem List   Diagnosis   • Gastroesophageal reflux disease   • Iron deficiency anemia   • Long term current use of non-steroidal anti-inflammatories (NSAID)   • COPD (chronic obstructive pulmonary disease)   • Malignant neoplasm of upper lobe of right lung     Past Medical History:   Diagnosis Date   • Arthritis    • Disease of thyroid gland     HYPOTHYROIDISM   • GERD (gastroesophageal reflux disease)    • High cholesterol    • Lung cancer     RIGHT UPPER LOBE   • Slow to wake up after anesthesia    • Spinal stenosis    • Wears contact lenses      Past Surgical History:   Procedure Laterality Date   • APPENDECTOMY     • BUNIONECTOMY      X 2   • HYSTERECTOMY     • LASIK     • THORACOSCOPY Right 2/17/2017    Procedure: BRONCHOSCOPY, RIGHT VAT, ROBOT ASSISTED RIGHT UPPER LOBECTOMY, INTERCOSTAL NERVE BLOCK WITH CRYOA;  Surgeon: Yuri Brizuela III, MD;  Location: [facility];  Service:    • THYROID SURGERY      nodule removed   • TONSILLECTOMY AND ADENOIDECTOMY       Family History   Problem Relation Age of Onset   • Lung cancer Mother    • Lung  cancer Father    • Lung cancer Sister      Social History     Social History   • Marital status: Single     Spouse name: N/A   • Number of children: N/A   • Years of education: N/A     Occupational History   • Not on file.     Social History Main Topics   • Smoking status: Former Smoker     Packs/day: 2.00     Years: 20.00     Types: Cigarettes     Quit date: 1987   • Smokeless tobacco: Not on file   • Alcohol use Yes      Comment: socially   • Drug use: No   • Sexual activity: Defer     Other Topics Concern   • Not on file     Social History Narrative       Current Outpatient Prescriptions:   •  aspirin 81 MG EC tablet, Take 81 mg by mouth Daily. PT TO HOLD MED PRIOR TO OR, Disp: , Rfl:   •  Calcium Carbonate-Vitamin D 600-200 MG-UNIT tablet, Take 1 tablet by mouth Daily., Disp: , Rfl:   •  cyanocobalamin 100 MCG tablet, Take 100 mcg by mouth Daily., Disp: , Rfl:   •  estradiol (ESTRACE) 0.5 MG tablet, Take 0.5 mg by mouth Daily., Disp: , Rfl:   •  levothyroxine (SYNTHROID, LEVOTHROID) 75 MCG tablet, Take 75 mcg by mouth Daily., Disp: , Rfl:   •  Multiple Vitamin (MULTIVITAMIN) capsule, Take 1 capsule by mouth Daily., Disp: , Rfl:   •  Multiple Vitamins-Minerals (EMERGEN-C VITAMIN C) pack, Take 1 package by mouth Daily As Needed., Disp: , Rfl:   •  Omega-3 Fatty Acids (FISH OIL) 1200 MG capsule capsule, Take 1,200 mg by mouth Daily With Breakfast. PT TO HOLD MED PRIOR TO OR, Disp: , Rfl:   •  omeprazole (priLOSEC) 20 MG capsule, Take 20 mg by mouth Daily., Disp: , Rfl:   •  simvastatin (ZOCOR) 20 MG tablet, , Disp: , Rfl:   •  diphenhydrAMINE-acetaminophen (TYLENOL PM)  MG tablet per tablet, Take 1 tablet by mouth At Night As Needed for sleep., Disp: , Rfl:   Allergies   Allergen Reactions   • Codeine Shortness Of Breath     CHEST PAIN           Review of Systems   Constitution: Negative.   HENT: Negative.    Eyes: Negative.    Cardiovascular: Negative.    Respiratory: Negative.         Right sided pleuritic  chest pain   Endocrine: Negative.    Hematologic/Lymphatic: Negative.    Skin: Negative.    Musculoskeletal: Negative.    Gastrointestinal: Negative.    Genitourinary: Negative.    Neurological: Negative.    Psychiatric/Behavioral: Negative.        Vitals:    10/11/17 1313   BP: 128/86   Pulse: 102   SpO2: 100%       Objective     Physical Exam   Constitutional: She is oriented to person, place, and time. Vital signs are normal. She appears well-developed.   HENT:   Head: Normocephalic and atraumatic.   Neck: Normal range of motion. Neck supple.   Cardiovascular: Normal rate, regular rhythm, normal heart sounds and intact distal pulses.    No murmur heard.  Pulmonary/Chest: Effort normal and breath sounds normal. She has no wheezes. She has no rhonchi. She has no rales. She exhibits no tenderness.   Abdominal: Soft. There is no tenderness.   Musculoskeletal: Normal range of motion. She exhibits no edema or tenderness.   Neurological: She is alert and oriented to person, place, and time. She has normal strength.   Skin: Skin is warm and dry. No rash noted. No cyanosis or erythema.   Psychiatric: She has a normal mood and affect. Her behavior is normal.       Review of Radiographic Studies:    Study Result   CT CHEST W CONTRAST-      CLINICAL HISTORY: 72-year-old female with history of lung carcinoma.  Evaluate for recurrent primary neoplasm or metastatic disease.  Restaging.      TECHNIQUE: Spiral CT images were obtained through the chest with IV  contrast and were reconstructed in 1 mm thick axial slices.      Radiation dose reduction techniques were utilized, including automated  exposure control and exposure modulation based on body size.      COMPARISON: CT scan of the chest dated 12/20/2016.      FINDINGS: The patient is status post right upper lobectomy. There is a  tiny pleural-based nodule in the superior segment of the right lower  lobe that is unchanged and is quite likely benign. No suspicious lung  masses  are identified. There are no parenchymal infiltrates or pleural  effusions. There is no mediastinal or hilar or axillary lymphadenopathy.  Images through the upper abdomen show no abnormalities. Incidental note  is made of a prominent pectus excavatum deformity of the chest wall. A  1.2 cm diameter hypodense nodule in the inferior aspect of the left lobe  of the thyroid gland is unchanged, and is most likely benign in  etiology.      IMPRESSION:  Status post right upper lobectomy. There is no evidence of  recurrent primary neoplasm or metastatic disease within the chest.      This report was finalized on 10/5/2017 1:50 PM by Dr. Shakeel Lewis MD.         Assessment/Plan   Diagnoses and all orders for this visit:    History of lung cancer  -     CT Chest With Contrast; Future      SUMMARY  Jerilyn Martinez has been doing well since she was last seen.  She denies any new complaints or concerns. 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  Her CT scan of chest remains stable.  There is no evidence of metastatic disease or disease reoccurrence. We will continue surveillance with a repeat CT scan of chest with contrast in 6 months and follow-up with Dr. Brizuela in the office.  In the meantime, instructed to contact us sooner for any problems or concerns.        Susana Wallace, APRN  10/11/17  3:43 PM

## 2017-12-13 ENCOUNTER — APPOINTMENT (OUTPATIENT)
Dept: WOMENS IMAGING | Facility: HOSPITAL | Age: 72
End: 2017-12-13

## 2017-12-13 PROCEDURE — 77063 BREAST TOMOSYNTHESIS BI: CPT | Performed by: RADIOLOGY

## 2017-12-13 PROCEDURE — 77080 DXA BONE DENSITY AXIAL: CPT | Performed by: RADIOLOGY

## 2017-12-13 PROCEDURE — G0202 SCR MAMMO BI INCL CAD: HCPCS | Performed by: RADIOLOGY

## 2018-01-02 ENCOUNTER — APPOINTMENT (OUTPATIENT)
Dept: WOMENS IMAGING | Facility: HOSPITAL | Age: 73
End: 2018-01-02

## 2018-01-02 PROCEDURE — 77065 DX MAMMO INCL CAD UNI: CPT | Performed by: RADIOLOGY

## 2018-01-02 PROCEDURE — 76641 ULTRASOUND BREAST COMPLETE: CPT | Performed by: RADIOLOGY

## 2018-01-02 PROCEDURE — G0279 TOMOSYNTHESIS, MAMMO: HCPCS | Performed by: RADIOLOGY

## 2018-04-09 ENCOUNTER — HOSPITAL ENCOUNTER (OUTPATIENT)
Dept: CT IMAGING | Facility: HOSPITAL | Age: 73
Discharge: HOME | End: 2018-04-09
Admitting: NURSE PRACTITIONER

## 2018-04-09 DIAGNOSIS — Z85.118 HISTORY OF LUNG CANCER: ICD-10-CM

## 2018-04-09 LAB — CREAT BLDA-MCNC: 0.6 MG/DL (ref 0.6–1.3)

## 2018-04-09 PROCEDURE — 25010000002 IOPAMIDOL 61 % SOLUTION: Performed by: NURSE PRACTITIONER

## 2018-04-09 PROCEDURE — 82565 ASSAY OF CREATININE: CPT

## 2018-04-09 PROCEDURE — 71260 CT THORAX DX C+: CPT

## 2018-04-09 RX ADMIN — IOPAMIDOL 85 ML: 612 INJECTION, SOLUTION INTRAVENOUS at 14:23

## 2018-05-01 DIAGNOSIS — Z85.118 HISTORY OF LUNG CANCER: Primary | ICD-10-CM

## 2018-11-02 ENCOUNTER — HOSPITAL ENCOUNTER (OUTPATIENT)
Dept: CT IMAGING | Facility: HOSPITAL | Age: 73
Discharge: HOME | End: 2018-11-02
Attending: THORACIC SURGERY (CARDIOTHORACIC VASCULAR SURGERY) | Admitting: THORACIC SURGERY (CARDIOTHORACIC VASCULAR SURGERY)

## 2018-11-02 DIAGNOSIS — Z85.118 HISTORY OF LUNG CANCER: ICD-10-CM

## 2018-11-02 LAB — CREAT BLDA-MCNC: 0.7 MG/DL (ref 0.6–1.3)

## 2018-11-02 PROCEDURE — 25010000002 IOPAMIDOL 61 % SOLUTION: Performed by: THORACIC SURGERY (CARDIOTHORACIC VASCULAR SURGERY)

## 2018-11-02 PROCEDURE — 82565 ASSAY OF CREATININE: CPT

## 2018-11-02 PROCEDURE — 71260 CT THORAX DX C+: CPT

## 2018-11-02 RX ADMIN — IOPAMIDOL 75 ML: 612 INJECTION, SOLUTION INTRAVENOUS at 09:31

## 2018-11-06 ENCOUNTER — OFFICE VISIT (OUTPATIENT)
Dept: SURGERY | Facility: CLINIC | Age: 73
End: 2018-11-06

## 2018-11-06 VITALS
BODY MASS INDEX: 23.14 KG/M2 | WEIGHT: 144 LBS | SYSTOLIC BLOOD PRESSURE: 122 MMHG | DIASTOLIC BLOOD PRESSURE: 62 MMHG | HEIGHT: 66 IN | HEART RATE: 86 BPM | OXYGEN SATURATION: 97 %

## 2018-11-06 DIAGNOSIS — C34.11 MALIGNANT NEOPLASM OF UPPER LOBE OF RIGHT LUNG (HCC): Primary | ICD-10-CM

## 2018-11-06 DIAGNOSIS — Z48.3 AFTERCARE FOLLOWING SURGERY FOR NEOPLASM: ICD-10-CM

## 2018-11-06 PROCEDURE — 99213 OFFICE O/P EST LOW 20 MIN: CPT | Performed by: THORACIC SURGERY (CARDIOTHORACIC VASCULAR SURGERY)

## 2018-11-06 NOTE — PROGRESS NOTES
Subjective   Patient ID: Jerilyn Martinez is a 73 y.o. female is here today for follow-up.    History of Present Illness  Dear Colleague,  Jerilyn Martinez was seen in our office today for further follow-up of a robot-assisted right upper lobectomy performed February 17, 2017 for a stage I non-small cell carcinoma of the lung.  Since her last visit here she has done very well.  She has some tightness in her chest wall when she stretches or exert yourself.  She reports no cough or hemoptysis.  There is no hoarseness or change in her voice.  She has no pleuritic pain.  She is active without shortness of breath or wheezing.  She has had no unexplained weight loss    The following portions of the patient's history were reviewed and updated as appropriate: allergies, current medications, past family history, past medical history, past social history, past surgical history and problem list.  Review of Systems   Constitution: Negative.   HENT: Negative.    Eyes: Negative.    Cardiovascular: Negative.    Respiratory: Negative.    Endocrine: Negative.    Hematologic/Lymphatic: Negative.    Skin: Negative.    Musculoskeletal: Negative.    Gastrointestinal: Negative.    Genitourinary: Negative.    Neurological: Negative.    Psychiatric/Behavioral: Negative.      Patient Active Problem List   Diagnosis   • Gastroesophageal reflux disease   • Iron deficiency anemia   • Long term current use of non-steroidal anti-inflammatories (NSAID)   • COPD (chronic obstructive pulmonary disease) (CMS/HCC)   • Malignant neoplasm of upper lobe of right lung (CMS/HCC)     Past Medical History:   Diagnosis Date   • Arthritis    • Disease of thyroid gland     HYPOTHYROIDISM   • GERD (gastroesophageal reflux disease)    • High cholesterol    • Lung cancer (CMS/HCC)     RIGHT UPPER LOBE   • Slow to wake up after anesthesia    • Spinal stenosis    • Wears contact lenses      Past Surgical History:   Procedure Laterality Date   • APPENDECTOMY     •  BUNIONECTOMY      X 2   • HYSTERECTOMY     • LASIK     • THORACOSCOPY Right 2/17/2017    Procedure: BRONCHOSCOPY, RIGHT VAT, ROBOT ASSISTED RIGHT UPPER LOBECTOMY, INTERCOSTAL NERVE BLOCK WITH CRYOA;  Surgeon: Yuri Brizuela III, MD;  Location: [facility] OR;  Service:    • THYROID SURGERY      nodule removed   • TONSILLECTOMY AND ADENOIDECTOMY       Family History   Problem Relation Age of Onset   • Lung cancer Mother    • Lung cancer Father    • Lung cancer Sister      Social History     Social History   • Marital status: Single     Spouse name: N/A   • Number of children: N/A   • Years of education: N/A     Occupational History   • Not on file.     Social History Main Topics   • Smoking status: Former Smoker     Packs/day: 2.00     Years: 20.00     Types: Cigarettes     Quit date: 1987   • Smokeless tobacco: Not on file   • Alcohol use Yes      Comment: socially   • Drug use: No   • Sexual activity: Defer     Other Topics Concern   • Not on file     Social History Narrative   • No narrative on file       Current Outpatient Prescriptions:   •  aspirin 81 MG EC tablet, Take 81 mg by mouth Daily. PT TO HOLD MED PRIOR TO OR, Disp: , Rfl:   •  Calcium Carbonate-Vitamin D 600-200 MG-UNIT tablet, Take 1 tablet by mouth Daily., Disp: , Rfl:   •  cyanocobalamin 100 MCG tablet, Take 100 mcg by mouth Daily., Disp: , Rfl:   •  diphenhydrAMINE-acetaminophen (TYLENOL PM)  MG tablet per tablet, Take 1 tablet by mouth At Night As Needed for sleep., Disp: , Rfl:   •  estradiol (ESTRACE) 0.5 MG tablet, Take 0.5 mg by mouth Daily., Disp: , Rfl:   •  levothyroxine (SYNTHROID, LEVOTHROID) 75 MCG tablet, Take 75 mcg by mouth Daily., Disp: , Rfl:   •  Multiple Vitamin (MULTIVITAMIN) capsule, Take 1 capsule by mouth Daily., Disp: , Rfl:   •  Multiple Vitamins-Minerals (EMERGEN-C VITAMIN C) pack, Take 1 package by mouth Daily As Needed., Disp: , Rfl:   •  Omega-3 Fatty Acids (FISH OIL) 1200 MG capsule capsule, Take 1,200 mg by  mouth Daily With Breakfast. PT TO HOLD MED PRIOR TO OR, Disp: , Rfl:   •  omeprazole (priLOSEC) 20 MG capsule, Take 20 mg by mouth Daily., Disp: , Rfl:   •  simvastatin (ZOCOR) 20 MG tablet, , Disp: , Rfl:   Allergies   Allergen Reactions   • Codeine Shortness Of Breath     CHEST PAIN          Objective   Vitals:    11/06/18 1350   BP: 122/62   Pulse: 86   SpO2: 97%     Physical Exam   Constitutional: She is oriented to person, place, and time. She appears well-developed and well-nourished.   HENT:   Head: Normocephalic.   Eyes: Pupils are equal, round, and reactive to light. Conjunctivae, EOM and lids are normal.   Neck: Trachea normal and normal range of motion. Neck supple. No hepatojugular reflux and no JVD present. Carotid bruit is not present. No thyroid mass and no thyromegaly present.   Cardiovascular: Normal rate, regular rhythm, S1 normal, S2 normal, normal heart sounds and normal pulses.   No extrasystoles are present. PMI is not displaced.    Pulmonary/Chest: Effort normal and breath sounds normal.   Incisions are well healed.  No subcutaneous masses or nodules.  Chest wall is stable   Abdominal: Soft. Normal appearance and bowel sounds are normal. She exhibits no mass. There is no hepatosplenomegaly. There is no tenderness. No hernia.   Musculoskeletal: Normal range of motion.   Neurological: She is alert and oriented to person, place, and time. She has normal strength and normal reflexes. No cranial nerve deficit or sensory deficit. She displays a negative Romberg sign.   Skin: Skin is warm, dry and intact.   Psychiatric: She has a normal mood and affect. Her speech is normal and behavior is normal. Judgment and thought content normal. Cognition and memory are normal.     Independent Review of Radiographic Studies:    CT scan of the chest performed November 2, 2018 was independently reviewed.  There are no new infiltrates nodules or masses.  No suspicious hilar or mediastinal adenopathy.  No pleural  effusions.  Upper abdomen shows left renal cortical nodule which is unchanged.    Assessment/Plan     The patient continues to do well with no evidence of recurrent or metastatic lung cancer.  She will return to see me in one year with a CT the chest without contrast.  I will keep you informed of her progress.  Thank you for allowing us to participate in the care Mrs. Martinez.    Diagnoses and all orders for this visit:    Malignant neoplasm of upper lobe of right lung (CMS/HCC)  -     CT Chest Without Contrast; Future    Aftercare following surgery for neoplasm  -     CT Chest Without Contrast; Future

## 2018-12-17 ENCOUNTER — APPOINTMENT (OUTPATIENT)
Dept: WOMENS IMAGING | Facility: HOSPITAL | Age: 73
End: 2018-12-17

## 2018-12-17 PROCEDURE — 77063 BREAST TOMOSYNTHESIS BI: CPT | Performed by: RADIOLOGY

## 2018-12-17 PROCEDURE — 77067 SCR MAMMO BI INCL CAD: CPT | Performed by: RADIOLOGY

## 2019-08-17 ENCOUNTER — APPOINTMENT (OUTPATIENT)
Dept: GENERAL RADIOLOGY | Facility: HOSPITAL | Age: 74
End: 2019-08-17

## 2019-08-17 PROCEDURE — 73660 X-RAY EXAM OF TOE(S): CPT | Performed by: FAMILY MEDICINE

## 2019-11-01 ENCOUNTER — HOSPITAL ENCOUNTER (OUTPATIENT)
Dept: CT IMAGING | Facility: HOSPITAL | Age: 74
Discharge: HOME | End: 2019-11-01
Attending: THORACIC SURGERY (CARDIOTHORACIC VASCULAR SURGERY) | Admitting: THORACIC SURGERY (CARDIOTHORACIC VASCULAR SURGERY)

## 2019-11-01 DIAGNOSIS — Z48.3 AFTERCARE FOLLOWING SURGERY FOR NEOPLASM: ICD-10-CM

## 2019-11-01 DIAGNOSIS — C34.11 MALIGNANT NEOPLASM OF UPPER LOBE OF RIGHT LUNG (HCC): ICD-10-CM

## 2019-11-01 PROCEDURE — 71250 CT THORAX DX C-: CPT

## 2019-11-06 ENCOUNTER — OFFICE VISIT (OUTPATIENT)
Dept: SURGERY | Facility: CLINIC | Age: 74
End: 2019-11-06

## 2019-11-06 VITALS
HEART RATE: 101 BPM | WEIGHT: 142 LBS | SYSTOLIC BLOOD PRESSURE: 132 MMHG | OXYGEN SATURATION: 100 % | HEIGHT: 65 IN | BODY MASS INDEX: 23.66 KG/M2 | DIASTOLIC BLOOD PRESSURE: 82 MMHG

## 2019-11-06 DIAGNOSIS — J43.8 OTHER EMPHYSEMA (HCC): ICD-10-CM

## 2019-11-06 DIAGNOSIS — C34.11 MALIGNANT NEOPLASM OF UPPER LOBE OF RIGHT LUNG (HCC): Primary | ICD-10-CM

## 2019-11-06 DIAGNOSIS — J06.9 UPPER RESPIRATORY INFECTION, ACUTE: ICD-10-CM

## 2019-11-06 DIAGNOSIS — Z85.118 HISTORY OF LUNG CANCER: ICD-10-CM

## 2019-11-06 PROCEDURE — 99213 OFFICE O/P EST LOW 20 MIN: CPT | Performed by: NURSE PRACTITIONER

## 2019-12-19 ENCOUNTER — APPOINTMENT (OUTPATIENT)
Dept: WOMENS IMAGING | Facility: HOSPITAL | Age: 74
End: 2019-12-19

## 2019-12-19 PROCEDURE — 77067 SCR MAMMO BI INCL CAD: CPT | Performed by: RADIOLOGY

## 2019-12-19 PROCEDURE — 77063 BREAST TOMOSYNTHESIS BI: CPT | Performed by: RADIOLOGY

## 2020-01-16 ENCOUNTER — OFFICE VISIT (OUTPATIENT)
Dept: ORTHOPEDIC SURGERY | Facility: CLINIC | Age: 75
End: 2020-01-16

## 2020-01-16 VITALS — HEIGHT: 65 IN | BODY MASS INDEX: 23.66 KG/M2 | WEIGHT: 142 LBS | TEMPERATURE: 98.1 F

## 2020-01-16 DIAGNOSIS — M25.561 CHRONIC PAIN OF RIGHT KNEE: Primary | ICD-10-CM

## 2020-01-16 DIAGNOSIS — G89.29 CHRONIC PAIN OF RIGHT KNEE: Primary | ICD-10-CM

## 2020-01-16 PROCEDURE — 99203 OFFICE O/P NEW LOW 30 MIN: CPT | Performed by: ORTHOPAEDIC SURGERY

## 2020-01-16 PROCEDURE — 73562 X-RAY EXAM OF KNEE 3: CPT | Performed by: ORTHOPAEDIC SURGERY

## 2020-01-16 RX ORDER — POLYETHYLENE GLYCOL-3350 AND ELECTROLYTES 236; 6.74; 5.86; 2.97; 22.74 G/274.31G; G/274.31G; G/274.31G; G/274.31G; G/274.31G
POWDER, FOR SOLUTION ORAL
COMMUNITY
Start: 2020-01-05 | End: 2020-10-08
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
## 2020-10-08 ENCOUNTER — OFFICE VISIT (OUTPATIENT)
Dept: ORTHOPEDIC SURGERY | Facility: CLINIC | Age: 75
End: 2020-10-08

## 2020-10-08 VITALS — TEMPERATURE: 97.7 F | WEIGHT: 140 LBS | HEIGHT: 66 IN | BODY MASS INDEX: 22.5 KG/M2

## 2020-10-08 DIAGNOSIS — G89.29 CHRONIC PAIN OF RIGHT KNEE: Primary | ICD-10-CM

## 2020-10-08 DIAGNOSIS — M25.561 CHRONIC PAIN OF RIGHT KNEE: Primary | ICD-10-CM

## 2020-10-08 PROCEDURE — 20610 DRAIN/INJ JOINT/BURSA W/O US: CPT | Performed by: NURSE PRACTITIONER

## 2020-10-08 PROCEDURE — 99213 OFFICE O/P EST LOW 20 MIN: CPT | Performed by: NURSE PRACTITIONER

## 2020-10-08 RX ORDER — METHYLPREDNISOLONE ACETATE 80 MG/ML
80 INJECTION, SUSPENSION INTRA-ARTICULAR; INTRALESIONAL; INTRAMUSCULAR; SOFT TISSUE
Status: COMPLETED | OUTPATIENT
Start: 2020-10-08 | End: 2020-10-08

## 2020-10-08 RX ADMIN — METHYLPREDNISOLONE ACETATE 80 MG: 80 INJECTION, SUSPENSION INTRA-ARTICULAR; INTRALESIONAL; INTRAMUSCULAR; SOFT TISSUE at 11:49

## 2020-10-08 NOTE — PROGRESS NOTES
Patient: Jerilyn Martinez  YOB: 1945 75 y.o. female  Medical Record Number: [id number]    Chief Complaints: Right knee pain    History of Present Illness:Jerilyn Martinez is a 75 y.o. female who presents with complaints of increased right knee pain and swelling.  Apparently the patient saw Dr. Vela about 7 months ago and at that time was having issues coming from her back she had epidural injections which did seem to help, started with increased right knee pain a couple months ago.  Denies any injury.  Denies any mechanical symptoms.  Describes her right knee pain as a moderate constant ache worse with standing walking better with rest she has had some swelling.  She actually saw a practitioner at [facility] and had an MRI and was told she had significant arthritis as well as associated meniscus tear.    Allergies:   Allergies   Allergen Reactions   • Codeine Shortness Of Breath     CHEST PAIN         Medications:   Current Outpatient Medications   Medication Sig Dispense Refill   • cyanocobalamin 100 MCG tablet Take 100 mcg by mouth Daily.     • estradiol (ESTRACE) 0.5 MG tablet Take 0.5 mg by mouth Daily.     • GAVILYTE-G 236 g solution      • levothyroxine (SYNTHROID, LEVOTHROID) 75 MCG tablet Take 75 mcg by mouth Daily.     • Multiple Vitamin (MULTIVITAMIN) capsule Take 1 capsule by mouth Daily.     • Omega-3 Fatty Acids (FISH OIL) 1200 MG capsule capsule Take 1,200 mg by mouth Daily With Breakfast. PT TO HOLD MED PRIOR TO OR     • omeprazole (priLOSEC) 20 MG capsule Take 20 mg by mouth Daily.     • simvastatin (ZOCOR) 20 MG tablet        No current facility-administered medications for this visit.          The following portions of the patient's history were reviewed and updated as appropriate: allergies, current medications, past family history, past medical history, past social history, past surgical history and problem list.    Review of Systems:   A 14 point review of systems was  "performed. All systems negative except pertinent positives/negative listed in HPI above    Physical Exam:   Vitals:    10/08/20 1128   Temp: 97.7 °F (36.5 °C)   TempSrc: Temporal   Weight: 63.5 kg (140 lb)   Height: 167.6 cm (66\")       General: A and O x 3, ASA, NAD    SCLERA:    Normal    DENTITION:   Normal  Skin clear no unusual lesions noted  Right knee the patient has 1+ effusion noted with 110 degrees flexion neutral and extension with a positive Zaira negative Lockman calf soft and nontender    Radiology:  Xrays 3views (ap,lateral, sunrise) previous x-rays of the right knee were reviewed and the patient does have some arthritic changes    Assessment/Plan:  Osteoarthritis right knee with associated meniscus tear and swelling    Patient discussed treatment options, she would like to proceed with right knee aspiration and injection, she was referred to outpatient physical therapy will make sure to drop that MRI report off in case her symptoms do not improve and we will otherwise see her back as needed    Large Joint Arthrocentesis: R knee  Date/Time: 10/8/2020 11:49 AM  Consent given by: patient  Site marked: site marked  Timeout: Immediately prior to procedure a time out was called to verify the correct patient, procedure, equipment, support staff and site/side marked as required   Supporting Documentation  Indications: pain and joint swelling   Procedure Details  Location: knee - R knee  Preparation: Patient was prepped and draped in the usual sterile fashion  Needle size: 22 G  Approach: anterolateral  Medications administered: 80 mg methylPREDNISolone acetate 80 MG/ML; 2 mL lidocaine (cardiac)  Aspirate amount: 40 mL  Aspirate: clear  Patient tolerance: patient tolerated the procedure well with no immediate complications        "

## 2020-10-29 ENCOUNTER — HOSPITAL ENCOUNTER (OUTPATIENT)
Dept: CT IMAGING | Facility: HOSPITAL | Age: 75
Discharge: HOME | End: 2020-10-29
Admitting: NURSE PRACTITIONER

## 2020-10-29 DIAGNOSIS — C34.11 MALIGNANT NEOPLASM OF UPPER LOBE OF RIGHT LUNG (HCC): ICD-10-CM

## 2020-10-29 DIAGNOSIS — Z85.118 HISTORY OF LUNG CANCER: ICD-10-CM

## 2020-10-29 PROCEDURE — 71250 CT THORAX DX C-: CPT

## 2020-11-02 ENCOUNTER — TREATMENT (OUTPATIENT)
Dept: PHYSICAL THERAPY | Facility: CLINIC | Age: 75
End: 2020-11-02

## 2020-11-02 DIAGNOSIS — S83.282D TEAR OF LATERAL MENISCUS OF LEFT KNEE, CURRENT, UNSPECIFIED TEAR TYPE, SUBSEQUENT ENCOUNTER: ICD-10-CM

## 2020-11-02 DIAGNOSIS — M25.561 CHRONIC PAIN OF RIGHT KNEE: Primary | ICD-10-CM

## 2020-11-02 DIAGNOSIS — G89.29 CHRONIC PAIN OF RIGHT KNEE: Primary | ICD-10-CM

## 2020-11-02 PROCEDURE — 97110 THERAPEUTIC EXERCISES: CPT | Performed by: PHYSICAL THERAPIST

## 2020-11-02 PROCEDURE — 97161 PT EVAL LOW COMPLEX 20 MIN: CPT | Performed by: PHYSICAL THERAPIST

## 2020-11-02 NOTE — PROGRESS NOTES
Physical Therapy Initial Evaluation and Plan of Care    Patient: Jerilyn Martinez   : 1945  Diagnosis/ICD-10 Code:  Chronic pain of right knee [M25.561, G89.29]  Referring practitioner: ADELITA Martins    Subjective Evaluation    History of Present Illness  Mechanism of injury: Chronic history of right knee pain, much worse the past few months after she twisted while out walking  Had xrays and MRI through DR Chi when seeing him for her back  Had epidurals for lower back - had PT for back issues and still does when needed  MRI states large lateral meniscal tear, tricompartmental OA and Bakers Cyst  Received cortisone shot in knee 10/8/20 - with good relief  Also had knee drained   Wanted to rehab the knee in efforts of preventing TKR  Takes ibu daily BID  Has rescue puppy at home  Walking 10-12K steps/day      Patient Occupation: Works part time at a [employer] shop Pain  Current pain ratin  At best pain ratin  At worst pain ratin  Location: Lateral knee, some radiation into the lateral calf, knee swells  Quality: dull ache, discomfort and sharp  Relieving factors: medications and change in position  Aggravating factors: prolonged positioning, stairs, ambulation and squatting  Progression: improved    Social Support  Lives in: condominium    Diagnostic Tests  MRI studies: abnormal    Treatments  Previous treatment: injection treatment and medication  Current treatment: physical therapy  Patient Goals  Patient goal: prevent surgery as long as possible           Objective          Observations     Additional Knee Observation Details  Slight swelling right knee - no pocketed area however  Valgus positioning in standing     Palpation     Right   No palpable tenderness to the distal biceps femoris, distal semimembranosus, vastus lateralis and vastus medialis.     Tenderness     Right Knee   Tenderness in the lateral joint line and quadriceps tendon.     Neurological Testing     Sensation      Knee     Right Knee   Intact: light touch     Active Range of Motion     Right Knee   Flexion: 127 degrees   Extension: 0 degrees     Strength/Myotome Testing     Right Knee   Flexion: 4+  Prone flexion: 4+  Extension: 4+    Swelling     Left Knee Girth Measurement (cm)   Joint line: 36 (mid patella) cm    Right Knee Girth Measurement (cm)   Joint line: 37 cm          Assessment & Plan     Assessment  Impairments: abnormal gait, abnormal or restricted ROM, activity intolerance, impaired balance, impaired physical strength, lacks appropriate home exercise program, pain with function and weight-bearing intolerance  Assessment details: 75 y.o. female with right knee pain due to lateral meniscal tear and OA presents with: 1. Intermittent knee pain, 2. Nearly full knee AROM, 3. Valgus position in WB, 4. Tenderness to palpation lateral joint line, 5. Decreased tolerance for WB activities at home, 6. Decreased tolerance for exercise walking  Prognosis: good  Functional Limitations: walking, standing and stooping  Goals  Plan Goals: Short Term Goals: 2 weeks  Patient will be able to tolerate initial exercises  Patient will have pain <5/10  Patient will be able to walk on level surface for >15 minutes without pain  Patient will be able to walk up a flight of steps without pain    Long Term Goals: 4 weeks  Patient will be independent in performing home exercise program.  Patient will have functional pain free knee AROM  Patient will be able to walk up/down a flight of steps without increased symptoms  Patient will return to regular exercise walking without pain    Plan  Therapy options: will be seen for skilled physical therapy services  Planned modality interventions: cryotherapy  Planned therapy interventions: manual therapy, strengthening, stretching and home exercise program  Frequency: 2x week  Duration in visits: 8  Duration in weeks: 4  Treatment plan discussed with: patient  Plan details: Patient issued written  HEP of exercises performed in clinic today          Manual Therapy:    0     mins  24428;  Therapeutic Exercise:    25     mins  21904;     Neuromuscular Veena:    0    mins  59122;    Therapeutic Activity:     5     mins  80663;   Knee anatomy review    Evaluation Time:     25  mins  Timed Treatment:   30   mins   Total Treatment:     60   mins    PT SIGNATURE: Eliza Desouza, PT   DATE TREATMENT INITIATED: 11/2/2020    Initial Certification  Certification Period: 1/31/2021  I certify that the therapy services are furnished while this patient is under my care.  The services outlined above are required by this patient, and will be reviewed every 90 days.     PHYSICIAN: Cass Burgos, APRN      DATE:     Please sign and return via fax to [phone].. Thank you, [facility].

## 2020-11-04 ENCOUNTER — OFFICE VISIT (OUTPATIENT)
Dept: SURGERY | Facility: CLINIC | Age: 75
End: 2020-11-04

## 2020-11-04 VITALS
DIASTOLIC BLOOD PRESSURE: 84 MMHG | OXYGEN SATURATION: 99 % | WEIGHT: 142.6 LBS | SYSTOLIC BLOOD PRESSURE: 126 MMHG | BODY MASS INDEX: 22.92 KG/M2 | HEART RATE: 82 BPM | HEIGHT: 66 IN | TEMPERATURE: 97.3 F

## 2020-11-04 DIAGNOSIS — C34.11 MALIGNANT NEOPLASM OF UPPER LOBE OF RIGHT LUNG (HCC): Primary | ICD-10-CM

## 2020-11-04 DIAGNOSIS — Z48.3 AFTERCARE FOLLOWING SURGERY FOR NEOPLASM: ICD-10-CM

## 2020-11-04 PROCEDURE — 99213 OFFICE O/P EST LOW 20 MIN: CPT | Performed by: THORACIC SURGERY (CARDIOTHORACIC VASCULAR SURGERY)

## 2020-11-04 NOTE — PROGRESS NOTES
Subjective   Patient ID: Jerilyn Martinez is a 75 y.o. female is here today for follow-up.    History of Present Illness  Dear Colleague,  Jerilyn Martinez was seen in our office today for further follow-up of a robot-assisted right upper lobectomy performed February 17, 2017 for a T1 a N0 M0 stage I adenocarcinoma of the lung.  Since her last visit here she has done very well.  She reports no cough or hemoptysis.  She has no hoarseness or change in her voice.  She has no pleuritic pain.  She is active and does have some shortness of breath especially climbing steps or a hill.  She has had no unexplained weight loss.    The following portions of the patient's history were reviewed and updated as appropriate: allergies, current medications, past family history, past medical history, past social history, past surgical history and problem list.  Review of Systems   Constitution: Negative.   HENT: Negative.    Eyes: Negative.    Cardiovascular: Negative.    Respiratory: Negative.    Endocrine: Negative.    Hematologic/Lymphatic: Negative.    Skin: Negative.    Musculoskeletal: Negative.    Gastrointestinal: Negative.    Genitourinary: Negative.    Neurological: Negative.    Psychiatric/Behavioral: Negative.    Allergic/Immunologic: Negative.      Patient Active Problem List   Diagnosis   • Gastroesophageal reflux disease   • Iron deficiency anemia   • Long term current use of non-steroidal anti-inflammatories (NSAID)   • COPD (chronic obstructive pulmonary disease) (CMS/HCC)   • Malignant neoplasm of upper lobe of right lung (CMS/HCC)     Past Medical History:   Diagnosis Date   • Arthritis    • Disease of thyroid gland     HYPOTHYROIDISM   • GERD (gastroesophageal reflux disease)    • High cholesterol    • Iron deficiency anemia    • Lung cancer (CMS/HCC)     RIGHT UPPER LOBE   • Slow to wake up after anesthesia    • Spinal stenosis    • Wears contact lenses      Past Surgical History:   Procedure Laterality Date   •  APPENDECTOMY     • BUNIONECTOMY      X 2   • HYSTERECTOMY     • LASIK     • THORACOSCOPY Right 2017    Procedure: BRONCHOSCOPY, RIGHT VAT, ROBOT ASSISTED RIGHT UPPER LOBECTOMY, INTERCOSTAL NERVE BLOCK WITH CRYOA;  Surgeon: Yuri Brizuela III, MD;  Location: [facility] OR;  Service:    • THYROID SURGERY      nodule removed   • TONSILLECTOMY AND ADENOIDECTOMY       Family History   Problem Relation Age of Onset   • Lung cancer Mother    • Lung cancer Father    • Lung cancer Sister      Social History     Socioeconomic History   • Marital status:      Spouse name: Not on file   • Number of children: Not on file   • Years of education: Not on file   • Highest education level: Not on file   Tobacco Use   • Smoking status: Former Smoker     Packs/day: 2.00     Years: 20.00     Pack years: 40.00     Types: Cigarettes     Quit date:      Years since quittin.8   Substance and Sexual Activity   • Alcohol use: Yes     Comment: socially   • Drug use: No   • Sexual activity: Not Currently     Partners: Female     Birth control/protection: Post-menopausal       Current Outpatient Medications:   •  levothyroxine (SYNTHROID, LEVOTHROID) 75 MCG tablet, Take 75 mcg by mouth Daily., Disp: , Rfl:   •  Multiple Vitamin (MULTIVITAMIN) capsule, Take 1 capsule by mouth Daily., Disp: , Rfl:   •  omeprazole (priLOSEC) 20 MG capsule, Take 20 mg by mouth Daily., Disp: , Rfl:   •  simvastatin (ZOCOR) 20 MG tablet, , Disp: , Rfl:   •  cyanocobalamin 100 MCG tablet, Take 100 mcg by mouth Daily., Disp: , Rfl:   •  Omega-3 Fatty Acids (FISH OIL) 1200 MG capsule capsule, Take 1,200 mg by mouth Daily With Breakfast. PT TO HOLD MED PRIOR TO OR, Disp: , Rfl:   Allergies   Allergen Reactions   • Codeine Shortness Of Breath     CHEST PAIN          Objective   Vitals:    20 0936   BP: 126/84   Pulse: 82   Temp: 97.3 °F (36.3 °C)   SpO2: 99%     Physical Exam  Constitutional:       Appearance: Normal appearance. She is  well-developed.   HENT:      Head: Normocephalic.   Eyes:      General: Lids are normal.      Conjunctiva/sclera: Conjunctivae normal.      Pupils: Pupils are equal, round, and reactive to light.   Neck:      Musculoskeletal: Normal range of motion and neck supple.      Thyroid: No thyroid mass or thyromegaly.      Vascular: No carotid bruit, hepatojugular reflux or JVD.      Trachea: Trachea normal.   Cardiovascular:      Rate and Rhythm: Normal rate and regular rhythm.  No extrasystoles are present.     Chest Wall: PMI is not displaced.      Pulses: Normal pulses.      Heart sounds: Normal heart sounds, S1 normal and S2 normal.   Pulmonary:      Effort: Pulmonary effort is normal.      Breath sounds: Examination of the right-lower field reveals decreased breath sounds. Decreased breath sounds present.   Chest:      Comments: Incisions are well-healed.  No subcutaneous masses or nodules.  Chest wall is stable.  Abdominal:      General: Bowel sounds are normal.      Palpations: Abdomen is soft. There is no mass.      Tenderness: There is no abdominal tenderness.      Hernia: No hernia is present.   Musculoskeletal: Normal range of motion.   Skin:     General: Skin is warm and dry.   Neurological:      Mental Status: She is alert and oriented to person, place, and time.      Cranial Nerves: No cranial nerve deficit.      Sensory: No sensory deficit.      Deep Tendon Reflexes: Reflexes are normal and symmetric.   Psychiatric:         Speech: Speech normal.         Behavior: Behavior normal.         Thought Content: Thought content normal.         Judgment: Judgment normal.       Independent Review of Radiographic Studies:    CT of the chest performed October 29, 2020 was independently reviewed and compared to previous x-rays.  Postoperative changes on the right.  No new infiltrates nodules or masses.  No suspicious hilar or mediastinal adenopathy.  No pleural effusions.  Stable lesion in the superior pole of the left  kidney.  Stable nodule in the left lobe of the thyroid gland.  No other new findings.    Assessment/Plan   Assessment:  Patient continues to do well and shows no evidence of recurrent or metastatic cancer 3 years out from her surgery.  We will continue our surveillance at 1 year intervals.    Plan:  Return to the office in 1 year with a CT of the chest without contrast.  I will keep you informed of her progress.  Thank you for allowing me to participate in the care of Ms. Martinez    Diagnoses and all orders for this visit:    Malignant neoplasm of upper lobe of right lung (CMS/HCC)  -     CT Chest Without Contrast; Future    Aftercare following surgery for neoplasm  -     CT Chest Without Contrast; Future

## 2020-11-09 ENCOUNTER — TREATMENT (OUTPATIENT)
Dept: PHYSICAL THERAPY | Facility: CLINIC | Age: 75
End: 2020-11-09

## 2020-11-09 DIAGNOSIS — S83.282D TEAR OF LATERAL MENISCUS OF LEFT KNEE, CURRENT, UNSPECIFIED TEAR TYPE, SUBSEQUENT ENCOUNTER: ICD-10-CM

## 2020-11-09 DIAGNOSIS — M25.561 CHRONIC PAIN OF RIGHT KNEE: Primary | ICD-10-CM

## 2020-11-09 DIAGNOSIS — G89.29 CHRONIC PAIN OF RIGHT KNEE: Primary | ICD-10-CM

## 2020-11-09 PROCEDURE — 97110 THERAPEUTIC EXERCISES: CPT | Performed by: PHYSICAL THERAPIST

## 2020-11-09 PROCEDURE — 97112 NEUROMUSCULAR REEDUCATION: CPT | Performed by: PHYSICAL THERAPIST

## 2020-11-09 NOTE — PROGRESS NOTES
Physical Therapy Daily Progress Note    VISIT#: 2    Subjective   Jerilyn Martinez reports: that she had a fair amount of pain after her last session.  States that she is not nearly as sore today. States that she has walked about 9500 steps so fat today.      Objective   Slight swelling in the right knee     Slight valgus positioning of the right knee unless attention paid to it    See Exercise, Manual, and Modality Logs for complete treatment.     Patient Education: Required re-instruction for proper exercise technique    Assessment/Plan  Soreness after last session but resolved now.  Needs added awareness of knee ;position with exercise.  Required multiple verbal cues for proper exercise technique    Progress strengthening /stabilization /functional activity           Manual Therapy:    0     mins  21320;  Therapeutic Exercise:    30/40     mins  28210;     Neuromuscular Veena:    10    mins  22504;    Therapeutic Activity:     0     mins  96241;     Dry Needling                  0_  mins    Timed Treatment:   40   mins   Total Treatment:     60   mins    Eliza Desouza, PT  [place] License # [id number]  Physical Therapist

## 2020-11-13 ENCOUNTER — TREATMENT (OUTPATIENT)
Dept: PHYSICAL THERAPY | Facility: CLINIC | Age: 75
End: 2020-11-13

## 2020-11-13 DIAGNOSIS — M25.561 CHRONIC PAIN OF RIGHT KNEE: Primary | ICD-10-CM

## 2020-11-13 DIAGNOSIS — S83.282D TEAR OF LATERAL MENISCUS OF LEFT KNEE, CURRENT, UNSPECIFIED TEAR TYPE, SUBSEQUENT ENCOUNTER: ICD-10-CM

## 2020-11-13 DIAGNOSIS — G89.29 CHRONIC PAIN OF RIGHT KNEE: Primary | ICD-10-CM

## 2020-11-13 PROCEDURE — 97112 NEUROMUSCULAR REEDUCATION: CPT | Performed by: PHYSICAL THERAPIST

## 2020-11-13 PROCEDURE — 97140 MANUAL THERAPY 1/> REGIONS: CPT | Performed by: PHYSICAL THERAPIST

## 2020-11-13 PROCEDURE — 97110 THERAPEUTIC EXERCISES: CPT | Performed by: PHYSICAL THERAPIST

## 2020-11-13 NOTE — PROGRESS NOTES
"Physical Therapy Daily Progress Note        VISIT#: 3      Jerilyn Martinez reports: Her knee is feeling worse since last PT session. She even hurt more after doing her exercises on Tuesday. It was like she never had a cortisone shot.   Current Pain Level:    7-8/10; Worst:   10/10; Best:  0/10 when laying down or sitting. The pain is intermittent  Location Of Pain: Lateral R knee  Response to Previous Session: Increased symptoms  Functional Deficits/Irritating Factors: twisting leg, squatting, sit<stand  Progression: worsening  Compliance with HEP Reported: Yes and no    Objective   Presents: Genu Valgus in R knee, Tender in lateral knee/IT band  Increased sets/reps of:  none   Increased resistance on:  none  Added to Program: BKFOs.         See Exercise, Manual, and Modality Logs for complete treatment.     Patient Education: Pt was educated on exercise biomechanical correctness, intensity, and speed.     Assessment:  Pt tender along R IT band. Performed STM to IT band to decrease pain and IT stretch for flexibility. Noted during [employer], pt RLE was in valgus and instructed pt to keep knee over toes when extending her leg. Pt had to use manual assistance of her UE to keep her knee in alignment with her foot. Added kinesiotape to help pt with proprioception and pain. Pt will continue to benefit from skilled PT interventions to address current functional deficits and impairments.       Plan: Progress to/Continue with current program. Add step ups - 4\".         Manual Therapy:    10     mins  46651;  Therapeutic Exercise:    25     mins  86104;     Neuromuscular Veena:    20    mins  71992;    Therapeutic Activity:     0     mins  94957;     Electrical Stimulation: __0____ mins 63504  Ultrasound:   0 mins 29319  Work Conditionin mins 49100  Iontophoresis: 0 mins 06437  Timed Treatment:   55   mins   Total Treatment:     65   mins    VITALY Magaña License # [id number]  Physical Therapist Assistant      "

## 2020-11-16 ENCOUNTER — TREATMENT (OUTPATIENT)
Dept: PHYSICAL THERAPY | Facility: CLINIC | Age: 75
End: 2020-11-16

## 2020-11-16 DIAGNOSIS — S83.282D TEAR OF LATERAL MENISCUS OF LEFT KNEE, CURRENT, UNSPECIFIED TEAR TYPE, SUBSEQUENT ENCOUNTER: ICD-10-CM

## 2020-11-16 DIAGNOSIS — M25.561 CHRONIC PAIN OF RIGHT KNEE: Primary | ICD-10-CM

## 2020-11-16 DIAGNOSIS — G89.29 CHRONIC PAIN OF RIGHT KNEE: Primary | ICD-10-CM

## 2020-11-16 PROCEDURE — 97110 THERAPEUTIC EXERCISES: CPT | Performed by: PHYSICAL THERAPIST

## 2020-11-16 PROCEDURE — 97112 NEUROMUSCULAR REEDUCATION: CPT | Performed by: PHYSICAL THERAPIST

## 2020-11-16 PROCEDURE — 97140 MANUAL THERAPY 1/> REGIONS: CPT | Performed by: PHYSICAL THERAPIST

## 2020-11-16 NOTE — PROGRESS NOTES
Physical Therapy Daily Progress Note    VISIT#: 4    Subjective   Jerilyn Martinez reports: that she had some lateral knee pain last night which then radiated up and down the lateral aspect of the knee.   States that the pain just came up out of the blue.      Objective   Slight tenderness in the lateral joint line    Positive lateral McMurrays    See Exercise, Manual, and Modality Logs for complete treatment.     Patient Education: lengthy discussion of progression to gym and potential surgery, rehab from the surgery     Assessment/Plan  Jerilyn has some lateral knee pain which was relieved with lateral knee unloading taping.  Discussion of progression of the program at her gym.    Progress strengthening /stabilization /functional activity           Manual Therapy:    15     mins  44550;  Therapeutic Exercise:    20/30     mins  82960;     Neuromuscular Veena:    10    mins  38607;    Therapeutic Activity:     0     mins  31719;     Dry Needling                  0_  mins    Timed Treatment:   45   mins   Total Treatment:     60   mins    Eliza Desouza, PT  [place] License # [id number]  Physical Therapist

## 2020-11-20 ENCOUNTER — TREATMENT (OUTPATIENT)
Dept: PHYSICAL THERAPY | Facility: CLINIC | Age: 75
End: 2020-11-20

## 2020-11-20 DIAGNOSIS — G89.29 CHRONIC PAIN OF RIGHT KNEE: Primary | ICD-10-CM

## 2020-11-20 DIAGNOSIS — M25.561 CHRONIC PAIN OF RIGHT KNEE: Primary | ICD-10-CM

## 2020-11-20 DIAGNOSIS — S83.282D TEAR OF LATERAL MENISCUS OF LEFT KNEE, CURRENT, UNSPECIFIED TEAR TYPE, SUBSEQUENT ENCOUNTER: ICD-10-CM

## 2020-11-20 PROCEDURE — 97110 THERAPEUTIC EXERCISES: CPT | Performed by: PHYSICAL THERAPIST

## 2020-11-20 PROCEDURE — 97140 MANUAL THERAPY 1/> REGIONS: CPT | Performed by: PHYSICAL THERAPIST

## 2020-11-20 PROCEDURE — 97112 NEUROMUSCULAR REEDUCATION: CPT | Performed by: PHYSICAL THERAPIST

## 2020-11-20 NOTE — PROGRESS NOTES
Physical Therapy Daily Progress Note        VISIT#: 5      Jerilyn Martinez reports: Her knee is really hurting today. Stated it started earlier this week and she believes it may be from the diagonal walking w/band but she is not sure. Stated the pain is not unbearable but she has a high tolerance to pain. Stated the tape seems to help. She did not notice much difference between the taping methods.  Current Pain Level:    7/10; Worst:   7/10; Best:  0/10  Location Of Pain: lateral r knee  Response to Previous Session: Increasing symptoms  Functional Deficits/Irritating Factors: initial ambulation, sit<stand, squatting, pushing  Progression: declining  Compliance with HEP Reported: Yes    Objective   Presents: Genu Valgus in R knee, Tender in lateral knee/IT band, Edema present around patella  Increased sets/reps of:  none   Increased resistance on:  BKFOs  Added to Program: Glute bridges    Gave additional HEP: Access Code: [email or web address]   URL: [email or web address]   Date: 11/20/2020   Prepared by: Sydney London     Exercises  Hooklying Single Leg Bent Knee Fallouts with Resistance - 10 reps - 3 sets - 1x daily - 7x weekly  Supine Bridge - 10 reps - 3 sets - 1x daily - 7x weekly      See Exercise, Manual, and Modality Logs for complete treatment.     Patient Education: Pt was educated on exercise biomechanical correctness, intensity, and speed.     Assessment:  Pt continues to be very tender in IT band from GT to fibula. Performed STM/IASTM to help decrease pain and tightness. Added kinesiotape to IT band to fibula and to patella for improved proprioception, patella tracking and pain modulation. Focused on decreasing pain levels today.  Pt will continue to benefit from skilled PT interventions to address current functional deficits and impairments.       Plan: Progress to/Continue with current program. Return to evaluating therapist. Assess response to taping/STM.         Manual Therapy:    10     mins   18597;  Therapeutic Exercise:    30     mins  66875;     Neuromuscular Veena:    15    mins  15090;    Therapeutic Activity:     0     mins  50351;     Electrical Stimulation: __0____ mins 95640  Ultrasound:   0 mins 37833  Work Conditionin mins 35248  Iontophoresis: 0 mins 74172  Timed Treatment:   55   mins   Total Treatment:     55   mins    Sydney London PTA  [place] License # [id number]  Physical Therapist Assistant

## 2020-11-23 ENCOUNTER — TREATMENT (OUTPATIENT)
Dept: PHYSICAL THERAPY | Facility: CLINIC | Age: 75
End: 2020-11-23

## 2020-11-23 DIAGNOSIS — S83.282D TEAR OF LATERAL MENISCUS OF LEFT KNEE, CURRENT, UNSPECIFIED TEAR TYPE, SUBSEQUENT ENCOUNTER: ICD-10-CM

## 2020-11-23 DIAGNOSIS — G89.29 CHRONIC PAIN OF RIGHT KNEE: Primary | ICD-10-CM

## 2020-11-23 DIAGNOSIS — M25.561 CHRONIC PAIN OF RIGHT KNEE: Primary | ICD-10-CM

## 2020-11-23 PROCEDURE — 97112 NEUROMUSCULAR REEDUCATION: CPT | Performed by: PHYSICAL THERAPIST

## 2020-11-23 PROCEDURE — 97110 THERAPEUTIC EXERCISES: CPT | Performed by: PHYSICAL THERAPIST

## 2020-11-23 NOTE — PROGRESS NOTES
Physical Therapy Daily Progress Note    VISIT#: 6    Leslie Martinez reports: that she had some increased pain after her last session.  Notes that she twisted the knee while walking the dog as well.  He notes that most of the pain is in the lateral knee.  Reports current pain is 6/10.      Objective   Presents with some swelling in the superior medial knee    Tenderness in the lateral joint line    Walks with valgus positioning     See Exercise, Manual, and Modality Logs for complete treatment.     Patient Education: Issued compressive sleeve for edema control    Assessment/Plan  Persistent swelling in the knee but feels better with compressive sleeve in place.  Needs to continue to work on stabilization exercises as she does not maintain the leg in neutral position, rather in valgus positioning with exercise.     Progress strengthening /stabilization /functional activity           Manual Therapy:    0     mins  85508;  Therapeutic Exercise:    30     mins  05291;     Neuromuscular Veena:    10    mins  15282;    Therapeutic Activity:     0     mins  04268;     Dry Needling                  0_  mins    Timed Treatment:   40   mins   Total Treatment:     60   mins    Eliza Desouza, PT  [place] License # [id number]  Physical Therapist

## 2020-11-30 ENCOUNTER — TREATMENT (OUTPATIENT)
Dept: PHYSICAL THERAPY | Facility: CLINIC | Age: 75
End: 2020-11-30

## 2020-11-30 DIAGNOSIS — M25.561 CHRONIC PAIN OF RIGHT KNEE: Primary | ICD-10-CM

## 2020-11-30 DIAGNOSIS — G89.29 CHRONIC PAIN OF RIGHT KNEE: Primary | ICD-10-CM

## 2020-11-30 DIAGNOSIS — S83.282D TEAR OF LATERAL MENISCUS OF LEFT KNEE, CURRENT, UNSPECIFIED TEAR TYPE, SUBSEQUENT ENCOUNTER: ICD-10-CM

## 2020-11-30 PROCEDURE — 97140 MANUAL THERAPY 1/> REGIONS: CPT | Performed by: PHYSICAL THERAPIST

## 2020-11-30 PROCEDURE — 97112 NEUROMUSCULAR REEDUCATION: CPT | Performed by: PHYSICAL THERAPIST

## 2020-11-30 PROCEDURE — 97110 THERAPEUTIC EXERCISES: CPT | Performed by: PHYSICAL THERAPIST

## 2020-11-30 NOTE — PROGRESS NOTES
Physical Therapy Daily Progress Note        VISIT#: 7      Jerilyn Martinez reports: Her knee is hurting today  Current Pain Level:    7/10; Worst:   8/10; Best:  0/10  Location Of Pain: Lateral R knee  Response to Previous Session: Good. No issues  Functional Deficits/Irritating Factors: Transfer from sit<stand  Progression: plateued  Compliance with HEP Reported: Yes    Objective   Presents: Neoprene brace donned. Genu Valgus in R knee, edema around R patella, Tender in R IT band  Increased sets/reps of:  none   Increased resistance on:  BKFOs  Added to Program: Supine Hip flexor Stretch, Hooklying IT Band/QL Stretch   Gave additional HEP: Access Code: [email or web address]   URL: [email or web address]   Date: 11/30/2020   Prepared by: Sydney London     Exercises  Hip Flexor Stretch at Edge of Bed - 10 reps - 3 sets - 1x daily - 7x weekly  Seated Hip Flexor Stretch - 10 reps - 3 sets - 1x daily - 7x weekly      See Exercise, Manual, and Modality Logs for complete treatment.     Patient Education: Pt was educated on exercise biomechanical correctness, intensity, and speed.     Assessment:  Noted extensor lag during SLR.  Tenderness in R IT band from GT to fibula. Lacks full hip extension when performing glut bridges and Prone hip extension so added hip flexor stretch since it may be limiting her extension range. Also noted B tight quads, R>L.  Pt needs LE stretching of her quads, IT band and hip flexors and strengthening of her gluts and hips - especially lateral hip musculature. Pt will continue to benefit from skilled PT interventions to address current functional deficits and impairments.       Plan: Progress to/Continue with current program. Return to evaluating therapist and assess response to today's session.         Manual Therapy:    15     mins  51105;  Therapeutic Exercise:    15     mins  33351;     Neuromuscular Veena:    30    mins  35201;    Therapeutic Activity:     0     mins  20157;     Electrical  Stimulation: __0____ mins 14524  Ultrasound:   0 mins 39962  Work Conditionin mins 92083  Iontophoresis: 0 mins 69636  Timed Treatment:   60   mins   Total Treatment:     60   mins    VITALY Magaña License # [id number]  Physical Therapist Assistant

## 2020-12-02 ENCOUNTER — TREATMENT (OUTPATIENT)
Dept: PHYSICAL THERAPY | Facility: CLINIC | Age: 75
End: 2020-12-02

## 2020-12-02 DIAGNOSIS — G89.29 CHRONIC PAIN OF RIGHT KNEE: Primary | ICD-10-CM

## 2020-12-02 DIAGNOSIS — M25.561 CHRONIC PAIN OF RIGHT KNEE: Primary | ICD-10-CM

## 2020-12-02 DIAGNOSIS — S83.282D TEAR OF LATERAL MENISCUS OF LEFT KNEE, CURRENT, UNSPECIFIED TEAR TYPE, SUBSEQUENT ENCOUNTER: ICD-10-CM

## 2020-12-02 PROCEDURE — 97110 THERAPEUTIC EXERCISES: CPT | Performed by: PHYSICAL THERAPIST

## 2020-12-02 PROCEDURE — 97112 NEUROMUSCULAR REEDUCATION: CPT | Performed by: PHYSICAL THERAPIST

## 2020-12-02 PROCEDURE — 97530 THERAPEUTIC ACTIVITIES: CPT | Performed by: PHYSICAL THERAPIST

## 2020-12-02 NOTE — PROGRESS NOTES
MD Letter - Reassessment  Patient: Jerilyn Martinez   : 1945    Date of Initial Visit: Type: THERAPY  Noted: 2020  Today's Date: 2020  Patient seen for 8 sessions    Treatment has included: therapeutic exercise, neuromuscular re-education, manual therapy, ultrasound and cryotherapy    Subjective   Jerilyn states that she is still having pain nearly any movement of the knee.  She reports a deep aching sensation with movement or weight bearing of the knee.  She denies a sense of the knee locking but does have some sense of weakness in the knee.  She reports intermittent popping in the knee but does not have pain with the pop.  She states that she feels stronger since doing the therapy and feels a bit more stable with that added strength.    Objective - she presents walking with a symmetrical gait pattern but is in a valgus position   Knee AROM - 0-121*  Strength - Quads 4+/5, Hamstrings 4+/5 both limited by pain  Sensation - intact  Palpation - tenderness to palpation in the lateral>medial joint line as well as along hte LCL  Special tests - negative for liga,emtous instability but has pain with lateral McMurrays test  Activity tolerances - she is able to perform all of her normal ADL's but has pain with any squatting     Assessment/Plan  Patient has demonstrated moderate improvement since the initiation of therapy.  The pain has remained presents with flexion of the knee.  The motion has remained functional.  The activity tolerances have increased slightly since starting therapy.  I feel that the patient would benefit from a further short course of therapy.          PT Signature: Eliza Desouza, PT        Manual Therapy:    0     mins  70102;  Therapeutic Exercise:    30     mins  56602;     Neuromuscular Veena:    15    mins  29921;    Therapeutic Activity:     10     mins  90291;   Reassessment and discussion of progression of HEP    Timed Treatment:   55   mins   Total Treatment:     65   mins

## 2020-12-09 ENCOUNTER — TREATMENT (OUTPATIENT)
Dept: PHYSICAL THERAPY | Facility: CLINIC | Age: 75
End: 2020-12-09

## 2020-12-09 DIAGNOSIS — G89.29 CHRONIC PAIN OF RIGHT KNEE: Primary | ICD-10-CM

## 2020-12-09 DIAGNOSIS — M25.561 CHRONIC PAIN OF RIGHT KNEE: Primary | ICD-10-CM

## 2020-12-09 DIAGNOSIS — S83.282D TEAR OF LATERAL MENISCUS OF LEFT KNEE, CURRENT, UNSPECIFIED TEAR TYPE, SUBSEQUENT ENCOUNTER: ICD-10-CM

## 2020-12-09 PROCEDURE — 97140 MANUAL THERAPY 1/> REGIONS: CPT | Performed by: PHYSICAL THERAPIST

## 2020-12-09 PROCEDURE — 97110 THERAPEUTIC EXERCISES: CPT | Performed by: PHYSICAL THERAPIST

## 2020-12-09 PROCEDURE — 97112 NEUROMUSCULAR REEDUCATION: CPT | Performed by: PHYSICAL THERAPIST

## 2020-12-09 NOTE — PROGRESS NOTES
Physical Therapy Daily Progress Note        VISIT#: 9      Jerilyn Martinez reports: She is not getting any better and does not feel like she has made a lot of improvements since starting PT. Stated she believes she is stronger in her hip and knee but they hurt.  Current Pain Level:    0/10 when not moving; Worst:   9-10/10; Best:  0/10  Location Of Pain: R knee  Response to Previous Session: Hurting  Functional Deficits/Irritating Factors: squatting, sit<stand  Progression: Declining  Compliance with HEP Reported: Yes with increased pain    Objective   Presents: Genu Valgus in R knee  Increased sets/reps of:  none   Increased resistance on:  none  Added to Program: Kinesiotape to lateral knee    See Exercise, Manual, and Modality Logs for complete treatment.     Patient Education: Pt was educated on exercise biomechanical correctness, intensity, and speed.     Assessment:  Pt progress has been limited according to patient's subjective report. She does acknowledge gains in strength but pain and tolerance to functional have not improved and somewhat declined. Pt has an appt. To follow up with referring MD on Dec. 24th. Other medical intervention may be necessary. Pt will continue to benefit from skilled PT interventions to address current functional deficits and impairments.       Plan: Progress to/Continue with current program but remaining in pain free/limited pain ROM and activity.         Manual Therapy:    15     mins  72546;  Therapeutic Exercise:    35     mins  95107;     Neuromuscular Veena:    10    mins  04637;    Therapeutic Activity:     0     mins  02248;     Electrical Stimulation: __0____ mins 36080  Ultrasound:   0 mins 27137  Work Conditionin mins 18771  Iontophoresis: 0 mins 31099  Timed Treatment:   60   mins   Total Treatment:     75   mins    Sydney London PTA  [place] License # [id number]  Physical Therapist Assistant

## 2020-12-23 ENCOUNTER — APPOINTMENT (OUTPATIENT)
Dept: WOMENS IMAGING | Facility: HOSPITAL | Age: 75
End: 2020-12-23

## 2020-12-23 ENCOUNTER — TREATMENT (OUTPATIENT)
Dept: PHYSICAL THERAPY | Facility: CLINIC | Age: 75
End: 2020-12-23

## 2020-12-23 DIAGNOSIS — G89.29 CHRONIC PAIN OF RIGHT KNEE: Primary | ICD-10-CM

## 2020-12-23 DIAGNOSIS — M25.561 CHRONIC PAIN OF RIGHT KNEE: Primary | ICD-10-CM

## 2020-12-23 DIAGNOSIS — S83.282D TEAR OF LATERAL MENISCUS OF LEFT KNEE, CURRENT, UNSPECIFIED TEAR TYPE, SUBSEQUENT ENCOUNTER: ICD-10-CM

## 2020-12-23 PROCEDURE — 97530 THERAPEUTIC ACTIVITIES: CPT | Performed by: PHYSICAL THERAPIST

## 2020-12-23 PROCEDURE — 97112 NEUROMUSCULAR REEDUCATION: CPT | Performed by: PHYSICAL THERAPIST

## 2020-12-23 PROCEDURE — 77067 SCR MAMMO BI INCL CAD: CPT | Performed by: RADIOLOGY

## 2020-12-23 PROCEDURE — 77063 BREAST TOMOSYNTHESIS BI: CPT | Performed by: RADIOLOGY

## 2020-12-23 PROCEDURE — 97110 THERAPEUTIC EXERCISES: CPT | Performed by: PHYSICAL THERAPIST

## 2020-12-23 NOTE — PROGRESS NOTES
"Patient: Jerilyn Martinez  YOB: 1945 75 y.o. female  Medical Record Number: [id number]    Chief Complaints: Right knee pain    History of Present Illness:Jerilyn Martinez is a 75 y.o. female who presents with complaints of increased right knee pain and swelling.  The patient has known history of significant osteoarthritic changes.  She describes the right knee pain as a moderate sometimes severe constant ache worse with standing walking, better with rest    Allergies:   Allergies   Allergen Reactions   • Codeine Shortness Of Breath     CHEST PAIN         Medications:   Current Outpatient Medications   Medication Sig Dispense Refill   • cyanocobalamin 100 MCG tablet Take 100 mcg by mouth Daily.     • levothyroxine (SYNTHROID, LEVOTHROID) 75 MCG tablet Take 75 mcg by mouth Daily.     • Multiple Vitamin (MULTIVITAMIN) capsule Take 1 capsule by mouth Daily.     • Omega-3 Fatty Acids (FISH OIL) 1200 MG capsule capsule Take 1,200 mg by mouth Daily With Breakfast. PT TO HOLD MED PRIOR TO OR     • omeprazole (priLOSEC) 20 MG capsule Take 20 mg by mouth Daily.     • simvastatin (ZOCOR) 20 MG tablet        No current facility-administered medications for this visit.          The following portions of the patient's history were reviewed and updated as appropriate: allergies, current medications, past family history, past medical history, past social history, past surgical history and problem list.    Review of Systems:   A 14 point review of systems was performed. All systems negative except pertinent positives/negative listed in HPI above    Physical Exam:   Vitals:    12/24/20 1016   Temp: 97.3 °F (36.3 °C)   Weight: 64.4 kg (142 lb)   Height: 165.1 cm (65\")   PainSc:   8       General: A and O x 3, ASA, NAD    SCLERA:    Normal    DENTITION:   Normal  Skin clear no unusual lesions noted  Right knee patient has 1+ effusion noted with 122 degrees flexion neutral extension with a positive Zaira negative Lockman " calf soft and nontender    Radiology:  Xrays 3views (ap,lateral, sunrise) previous x-rays of the right knee were reviewed and the patient does have arthritic changes    Assessment/Plan:  Osteoarthritis right knee    Patient discussed options, she would like to proceed with right knee aspiration, injection, continue physical therapy, and I will see her back in 3 months if needed    Large Joint Arthrocentesis: R knee  Date/Time: 12/24/2020 10:37 AM  Consent given by: patient  Site marked: site marked  Timeout: Immediately prior to procedure a time out was called to verify the correct patient, procedure, equipment, support staff and site/side marked as required   Supporting Documentation  Indications: pain   Procedure Details  Location: knee - R knee  Preparation: Patient was prepped and draped in the usual sterile fashion  Needle gauge: 21G.  Approach: anterolateral  Medications administered: 4 mL lidocaine (cardiac); 80 mg methylPREDNISolone acetate 80 MG/ML  Aspirate amount: 30 mL  Aspirate: clear  Patient tolerance: patient tolerated the procedure well with no immediate complications        ADELITA Lauren

## 2020-12-23 NOTE — PROGRESS NOTES
MD Letter - Reassessment  Patient: Jerilyn Martinez   : 1945    Date of Initial Visit: Type: THERAPY  Noted: 2020  Today's Date: 2020  Patient seen for 10 sessions    Treatment has included: therapeutic exercise, neuromuscular re-education, manual therapy and ultrasound    Subjective   Jerilyn states that the pain in the lateral knee is present with  Motion of the knee.  She states that she actually feels better walking on level surfaces than she does just lifting her leg.  She complains of pain that varies up to a 10/10 with transtion from sit to stand.   She complains of pain upon awakening but does sleep well through the night.  She does state that she feels stronger since coming to therapy.    Objective - she stands and walks with the knees iin a valgus position but has symmetrical stance phase and step length.   Knee AROM -  0-129*  Strength - quads 4+/5, hamstrings 4+/5  Sensation - intact  Palpation -Tenderness in lateral   Special tests - pain with lateral McMurrays and varus stress test  Activity tolerances - she is able to walk on level surface and up/down steps with a reciprocal gait pattern but does have pain with descending the steps    Assessment/Plan  Patient has demonstrated moderate improvement since the initiation of therapy.  The pain has persisted but the strength has increased.  The motion has remained very functional.  The activity tolerances have increased but still has pain with transitional movements and step climbing as well as squatting and pivoting type movements.  I feel that the patient would benefit from further medical intervention.  If you have any questions concerning the care, please do not hesitate to contact me.          PT Signature: Eliza Desouza, PT        Manual Therapy:    5     mins  94664;  Therapeutic Exercise:    25     mins  60313;     Neuromuscular Veena:    10    mins  20201;    Therapeutic Activity:     15     mins  03167;   Assessed for MD and lengthy  discussion of possible surgical interventions and rehab processes    Timed Treatment:   55   mins   Total Treatment:     60   mins

## 2020-12-24 ENCOUNTER — OFFICE VISIT (OUTPATIENT)
Dept: ORTHOPEDIC SURGERY | Facility: CLINIC | Age: 75
End: 2020-12-24

## 2020-12-24 VITALS — BODY MASS INDEX: 23.66 KG/M2 | TEMPERATURE: 97.3 F | WEIGHT: 142 LBS | HEIGHT: 65 IN

## 2020-12-24 DIAGNOSIS — G89.29 CHRONIC PAIN OF RIGHT KNEE: Primary | ICD-10-CM

## 2020-12-24 DIAGNOSIS — M25.561 CHRONIC PAIN OF RIGHT KNEE: Primary | ICD-10-CM

## 2020-12-24 PROCEDURE — 20610 DRAIN/INJ JOINT/BURSA W/O US: CPT | Performed by: NURSE PRACTITIONER

## 2020-12-24 PROCEDURE — 99213 OFFICE O/P EST LOW 20 MIN: CPT | Performed by: NURSE PRACTITIONER

## 2020-12-24 RX ORDER — METHYLPREDNISOLONE ACETATE 80 MG/ML
80 INJECTION, SUSPENSION INTRA-ARTICULAR; INTRALESIONAL; INTRAMUSCULAR; SOFT TISSUE
Status: COMPLETED | OUTPATIENT
Start: 2020-12-24 | End: 2020-12-24

## 2020-12-24 RX ADMIN — METHYLPREDNISOLONE ACETATE 80 MG: 80 INJECTION, SUSPENSION INTRA-ARTICULAR; INTRALESIONAL; INTRAMUSCULAR; SOFT TISSUE at 10:37

## 2021-02-08 ENCOUNTER — DOCUMENTATION (OUTPATIENT)
Dept: PHYSICAL THERAPY | Facility: CLINIC | Age: 76
End: 2021-02-08

## 2021-02-08 NOTE — PROGRESS NOTES
Discharge Summary  Discharge Summary from Physical Therapy Report    Patient Information  Jerilyn Martinez  1945    Dates  PT visit: 11/02/20 to 12/23/20  Number of Visits: 10     Discharge Status of Patient: See PT Note dated 12/23/20    Goals: Partially Met    Visit Diagnoses:  No diagnosis found.    Discharge Plan: Patient to return to referring/providing physician  Refer to other services (specify):futher medical intervention    Comments None    Date of Discharge 02/08/2021        Sydney London, VITALY  Physical Therapist

## 2021-03-02 DIAGNOSIS — Z23 IMMUNIZATION DUE: ICD-10-CM

## 2021-04-06 ENCOUNTER — CLINICAL SUPPORT (OUTPATIENT)
Dept: ORTHOPEDIC SURGERY | Facility: CLINIC | Age: 76
End: 2021-04-06

## 2021-04-06 VITALS — HEIGHT: 65 IN | BODY MASS INDEX: 23.66 KG/M2 | WEIGHT: 142 LBS | TEMPERATURE: 97.2 F

## 2021-04-06 DIAGNOSIS — G89.29 CHRONIC PAIN OF RIGHT KNEE: Primary | ICD-10-CM

## 2021-04-06 DIAGNOSIS — M25.561 CHRONIC PAIN OF RIGHT KNEE: Primary | ICD-10-CM

## 2021-04-06 PROCEDURE — 73562 X-RAY EXAM OF KNEE 3: CPT | Performed by: NURSE PRACTITIONER

## 2021-04-06 PROCEDURE — 20610 DRAIN/INJ JOINT/BURSA W/O US: CPT | Performed by: NURSE PRACTITIONER

## 2021-04-06 RX ORDER — METHYLPREDNISOLONE ACETATE 80 MG/ML
80 INJECTION, SUSPENSION INTRA-ARTICULAR; INTRALESIONAL; INTRAMUSCULAR; SOFT TISSUE
Status: COMPLETED | OUTPATIENT
Start: 2021-04-06 | End: 2021-04-06

## 2021-04-06 RX ORDER — MELOXICAM 15 MG/1
15 TABLET ORAL DAILY
Qty: 30 TABLET | Refills: 3 | Status: SHIPPED | OUTPATIENT
Start: 2021-04-06 | End: 2021-07-17 | Stop reason: HOSPADM

## 2021-04-06 RX ADMIN — METHYLPREDNISOLONE ACETATE 80 MG: 80 INJECTION, SUSPENSION INTRA-ARTICULAR; INTRALESIONAL; INTRAMUSCULAR; SOFT TISSUE at 16:20

## 2021-04-06 NOTE — PROGRESS NOTES
4/6/2021    Jerilyn Martinez is here today for worsening knee pain. Pt has undergone injection of the knee in the past with good resolution of symptoms. Pt is requesting a repeat injection.     KNEE Injection Procedure Note:    Large Joint Arthrocentesis: R knee  Date/Time: 4/6/2021 4:20 PM  Consent given by: patient  Site marked: site marked  Timeout: Immediately prior to procedure a time out was called to verify the correct patient, procedure, equipment, support staff and site/side marked as required   Supporting Documentation  Indications: pain and joint swelling   Procedure Details  Location: knee - R knee  Preparation: Patient was prepped and draped in the usual sterile fashion  Needle size: 22 G  Approach: anterolateral  Medications administered: 80 mg methylPREDNISolone acetate 80 MG/ML; 2 mL lidocaine (cardiac)  Patient tolerance: patient tolerated the procedure well with no immediate complications        3 views of right knee were ordered and reviewed today secondary to pain show bone-on-bone end-stage osteoarthritis.  Compared to views show definite progression in arthritic changes, we will see how she does with the injection prescription sent to pharmacy for meloxicam as well  At the conclusion of the injection I discussed the importance of continued quad strengthening exercises on a daily basis. I will see the patient back in 6 weeks for follow-up and potentially discuss total knee replacement at that time if her symptoms do not improve    Cass Burgos, APRN  4/6/2021

## 2021-05-25 ENCOUNTER — OFFICE VISIT (OUTPATIENT)
Dept: ORTHOPEDIC SURGERY | Facility: CLINIC | Age: 76
End: 2021-05-25

## 2021-05-25 VITALS — BODY MASS INDEX: 23.66 KG/M2 | TEMPERATURE: 96 F | WEIGHT: 142 LBS | HEIGHT: 65 IN

## 2021-05-25 DIAGNOSIS — G89.29 CHRONIC PAIN OF RIGHT KNEE: Primary | ICD-10-CM

## 2021-05-25 DIAGNOSIS — M25.561 CHRONIC PAIN OF RIGHT KNEE: Primary | ICD-10-CM

## 2021-05-25 PROCEDURE — 99214 OFFICE O/P EST MOD 30 MIN: CPT | Performed by: NURSE PRACTITIONER

## 2021-05-25 RX ORDER — VANCOMYCIN HYDROCHLORIDE 1 G/200ML
15 INJECTION, SOLUTION INTRAVENOUS ONCE
Status: CANCELLED | OUTPATIENT
Start: 2021-07-16 | End: 2021-05-25

## 2021-05-25 RX ORDER — MELOXICAM 7.5 MG/1
15 TABLET ORAL ONCE
Status: CANCELLED | OUTPATIENT
Start: 2021-07-16 | End: 2021-05-25

## 2021-05-25 RX ORDER — PREGABALIN 75 MG/1
150 CAPSULE ORAL ONCE
Status: CANCELLED | OUTPATIENT
Start: 2021-07-16 | End: 2021-05-25

## 2021-05-25 RX ORDER — CEFAZOLIN SODIUM 2 G/100ML
2 INJECTION, SOLUTION INTRAVENOUS ONCE
Status: CANCELLED | OUTPATIENT
Start: 2021-07-16 | End: 2021-05-25

## 2021-05-25 RX ORDER — CHLORHEXIDINE GLUCONATE 500 MG/1
CLOTH TOPICAL 2 TIMES DAILY
Status: CANCELLED | OUTPATIENT
Start: 2021-05-25

## 2021-05-25 NOTE — PROGRESS NOTES
"Patient: Jerilyn Martinez  YOB: 1945 76 y.o. female  Medical Record Number: [id number]    Chief Complaints:   Chief Complaint   Patient presents with   • Right Knee - Follow-up, Pain       History of Present Illness:Jerilyn Martinez is a 76 y.o. female who presents with complaints of severe right knee pain.  She has tried and failed all conservative measures including injections, physical therapy, anti-inflammatories, pain continues to be severe she would like to proceed with surgery    Allergies:   Allergies   Allergen Reactions   • Codeine Shortness Of Breath     CHEST PAIN         Medications:   Current Outpatient Medications   Medication Sig Dispense Refill   • cyanocobalamin 100 MCG tablet Take 100 mcg by mouth Daily.     • levothyroxine (SYNTHROID, LEVOTHROID) 75 MCG tablet Take 75 mcg by mouth Daily.     • meloxicam (Mobic) 15 MG tablet Take 1 tablet by mouth Daily. 30 tablet 3   • Multiple Vitamin (MULTIVITAMIN) capsule Take 1 capsule by mouth Daily.     • omeprazole (priLOSEC) 20 MG capsule Take 20 mg by mouth Daily.     • simvastatin (ZOCOR) 20 MG tablet      • Omega-3 Fatty Acids (FISH OIL) 1200 MG capsule capsule Take 1,200 mg by mouth Daily With Breakfast. PT TO HOLD MED PRIOR TO OR       No current facility-administered medications for this visit.         The following portions of the patient's history were reviewed and updated as appropriate: allergies, current medications, past family history, past medical history, past social history, past surgical history and problem list.    Review of Systems:   A 14 point review of systems was performed. All systems negative except pertinent positives/negative listed in HPI above    Physical Exam:   Vitals:    05/25/21 0826   Temp: 96 °F (35.6 °C)   Weight: 64.4 kg (142 lb)   Height: 165.1 cm (65\")   PainSc:   7       General: A and O x 3, ASA, NAD    SCLERA:    Normal    DENTITION:   Normal  Skin clear no unusual lesions noted  Right knee patient does " have 1+ effusion noted with 110 degrees flexion neutral extension with a positive Zaira negative Lockman calf soft and nontender       Radiology:  Xrays 3views (ap,lateral, sunrise) previous x-rays of the right knee were reviewed show bone-on-bone end-stage osteoarthritis    Assessment/Plan: End-stage osteoarthritis right knee    Patient I discussed options, she also saw Dr. Vela, she would like to proceed with right total knee replacement  Continuation of conservative management vs. TKA discussed.  The patient wishes to proceed with total knee replacement.  At this point the patient has failed the full compliment of conservative treatment and stating complete understanding of the risks/benefits/ anternatives wishes to proceed with surgical treatment.    Risk and benefits of surgery were reviewed.  Including, but not limited to, blood clots or pulmonary embolism, anesthesia risk, infection, fracture, skin/leg numbness, persistent pain/crepitance/popping/catching, failure of the implant, need for future surgeries, hematoma, possible nerve or blood vessel injury, need for transfusion, and potential risk of stroke,heart attack or death, among others.  The patient understands and wishes to proceed.     It was explained that if tissue has been repaired or reconstructed, there is also an increased chance of failure which may require further management.  Following the completion of the discussion, the patient expressed understanding of this planned course of care, all their questions were answered and consent will be obtained preoperatively.    Operative Plan: [facility] Oxinium Total Knee Replacement an overnight staywith home health rehab        Cass Burgos, APRN  5/25/2021

## 2021-05-26 ENCOUNTER — TELEPHONE (OUTPATIENT)
Dept: ORTHOPEDIC SURGERY | Facility: CLINIC | Age: 76
End: 2021-05-26

## 2021-05-27 PROBLEM — G89.29 CHRONIC PAIN OF RIGHT KNEE: Status: ACTIVE | Noted: 2021-05-27

## 2021-05-27 PROBLEM — M25.561 CHRONIC PAIN OF RIGHT KNEE: Status: ACTIVE | Noted: 2021-05-27

## 2021-06-22 ENCOUNTER — TRANSCRIBE ORDERS (OUTPATIENT)
Dept: PREADMISSION TESTING | Facility: HOSPITAL | Age: 76
End: 2021-06-22

## 2021-06-28 ENCOUNTER — PRE-ADMISSION TESTING (OUTPATIENT)
Dept: PREADMISSION TESTING | Facility: HOSPITAL | Age: 76
End: 2021-06-28

## 2021-06-28 VITALS
HEIGHT: 66 IN | SYSTOLIC BLOOD PRESSURE: 153 MMHG | BODY MASS INDEX: 22.34 KG/M2 | WEIGHT: 139 LBS | DIASTOLIC BLOOD PRESSURE: 91 MMHG | TEMPERATURE: 98.3 F | HEART RATE: 78 BPM | OXYGEN SATURATION: 97 % | RESPIRATION RATE: 16 BRPM

## 2021-06-28 DIAGNOSIS — G89.29 CHRONIC PAIN OF RIGHT KNEE: ICD-10-CM

## 2021-06-28 DIAGNOSIS — M25.561 CHRONIC PAIN OF RIGHT KNEE: ICD-10-CM

## 2021-06-28 LAB
ANION GAP SERPL CALCULATED.3IONS-SCNC: 8.5 MMOL/L (ref 5–15)
BILIRUB UR QL STRIP: NEGATIVE
BUN SERPL-MCNC: 23 MG/DL (ref 8–23)
BUN/CREAT SERPL: 31.5 (ref 7–25)
CALCIUM SPEC-SCNC: 9.3 MG/DL (ref 8.6–10.5)
CHLORIDE SERPL-SCNC: 103 MMOL/L (ref 98–107)
CLARITY UR: CLEAR
CO2 SERPL-SCNC: 26.5 MMOL/L (ref 22–29)
COLOR UR: YELLOW
CREAT SERPL-MCNC: 0.73 MG/DL (ref 0.57–1)
DEPRECATED RDW RBC AUTO: 46.2 FL (ref 37–54)
ERYTHROCYTE [DISTWIDTH] IN BLOOD BY AUTOMATED COUNT: 14.4 % (ref 12.3–15.4)
GFR SERPL CREATININE-BSD FRML MDRD: 78 ML/MIN/1.73
GLUCOSE SERPL-MCNC: 106 MG/DL (ref 65–99)
GLUCOSE UR STRIP-MCNC: NEGATIVE MG/DL
HCT VFR BLD AUTO: 40.3 % (ref 34–46.6)
HGB BLD-MCNC: 13.2 G/DL (ref 12–15.9)
HGB UR QL STRIP.AUTO: NEGATIVE
KETONES UR QL STRIP: ABNORMAL
LEUKOCYTE ESTERASE UR QL STRIP.AUTO: NEGATIVE
MCH RBC QN AUTO: 28.6 PG (ref 26.6–33)
MCHC RBC AUTO-ENTMCNC: 32.8 G/DL (ref 31.5–35.7)
MCV RBC AUTO: 87.2 FL (ref 79–97)
NITRITE UR QL STRIP: NEGATIVE
PH UR STRIP.AUTO: 5.5 [PH] (ref 5–8)
PLATELET # BLD AUTO: 211 10*3/MM3 (ref 140–450)
PMV BLD AUTO: 10.5 FL (ref 6–12)
POTASSIUM SERPL-SCNC: 4.2 MMOL/L (ref 3.5–5.2)
PROT UR QL STRIP: NEGATIVE
QT INTERVAL: 362 MS
RBC # BLD AUTO: 4.62 10*6/MM3 (ref 3.77–5.28)
SODIUM SERPL-SCNC: 138 MMOL/L (ref 136–145)
SP GR UR STRIP: 1.02 (ref 1–1.03)
UROBILINOGEN UR QL STRIP: ABNORMAL
WBC # BLD AUTO: 5.75 10*3/MM3 (ref 3.4–10.8)

## 2021-06-28 PROCEDURE — 36415 COLL VENOUS BLD VENIPUNCTURE: CPT

## 2021-06-28 PROCEDURE — 93010 ELECTROCARDIOGRAM REPORT: CPT | Performed by: INTERNAL MEDICINE

## 2021-06-28 PROCEDURE — 93005 ELECTROCARDIOGRAM TRACING: CPT

## 2021-06-28 PROCEDURE — A9270 NON-COVERED ITEM OR SERVICE: HCPCS | Performed by: NURSE PRACTITIONER

## 2021-06-28 PROCEDURE — 81003 URINALYSIS AUTO W/O SCOPE: CPT

## 2021-06-28 PROCEDURE — 63710000001 MUPIROCIN 2 % OINTMENT: Performed by: NURSE PRACTITIONER

## 2021-06-28 PROCEDURE — 85027 COMPLETE CBC AUTOMATED: CPT

## 2021-06-28 PROCEDURE — 80048 BASIC METABOLIC PNL TOTAL CA: CPT

## 2021-06-28 RX ORDER — CHLORHEXIDINE GLUCONATE 500 MG/1
1 CLOTH TOPICAL
COMMUNITY
End: 2021-07-17 | Stop reason: HOSPADM

## 2021-06-28 RX ORDER — CHLORHEXIDINE GLUCONATE 500 MG/1
CLOTH TOPICAL 2 TIMES DAILY
Status: ACTIVE | OUTPATIENT
Start: 2021-06-28

## 2021-06-28 ASSESSMENT — KOOS JR
KOOS JR SCORE: 15
KOOS JR SCORE: 50.012

## 2021-07-01 ENCOUNTER — OFFICE VISIT (OUTPATIENT)
Dept: ORTHOPEDIC SURGERY | Facility: CLINIC | Age: 76
End: 2021-07-01

## 2021-07-01 VITALS — TEMPERATURE: 98 F | HEIGHT: 66 IN | BODY MASS INDEX: 22.98 KG/M2 | WEIGHT: 143 LBS

## 2021-07-01 DIAGNOSIS — R52 PAIN: Primary | ICD-10-CM

## 2021-07-01 PROCEDURE — 77077 JOINT SURVEY SINGLE VIEW: CPT | Performed by: ORTHOPAEDIC SURGERY

## 2021-07-01 PROCEDURE — S0260 H&P FOR SURGERY: HCPCS | Performed by: NURSE PRACTITIONER

## 2021-07-01 NOTE — H&P
Patient: Jerilyn Martinez    YOB: 1945    Medical Record Number: [id number]    Admitting Physician: Dr. Monroe Vela    Reason for Admission: End Stage right knee OA    History of Present Illness: 76 y.o. female presents with severe end stage knee osteoarthritis which has not been responsive to the full compliment of conservative measures. Despite conservative attempts, there is still severe, constant activity limiting pain. Given the severity of the pain, the patient has elected to proceed with knee replacement.    Allergies:   Allergies   Allergen Reactions   • Codeine Shortness Of Breath     CHEST PAIN           Current Medications:  Home Medications:    Current Outpatient Medications on File Prior to Visit   Medication Sig   • Chlorhexidine Gluconate Cloth 2 % pads Apply 1 application topically. AS DIRECTED   • cyanocobalamin 100 MCG tablet Take 100 mcg by mouth Daily.   • levothyroxine (SYNTHROID, LEVOTHROID) 75 MCG tablet Take 75 mcg by mouth Daily.   • meloxicam (Mobic) 15 MG tablet Take 1 tablet by mouth Daily. (Patient taking differently: Take 15 mg by mouth Daily. HOLD FOR SURGERY)   • Multiple Vitamin (MULTIVITAMIN) capsule Take 1 capsule by mouth Daily.   • mupirocin (BACTROBAN) 2 % ointment Apply 1 application topically to the appropriate area as directed. AS DIRECTED   • omeprazole (priLOSEC) 20 MG capsule Take 20 mg by mouth Daily.   • simvastatin (ZOCOR) 20 MG tablet 20 mg Every Night.     Current Facility-Administered Medications on File Prior to Visit   Medication   • Chlorhexidine Gluconate Cloth 2 % pads     PRN Meds:.    PMH:     Past Medical History:   Diagnosis Date   • Arthritis    • Disease of thyroid gland     HYPOTHYROIDISM   • GERD (gastroesophageal reflux disease)    • High cholesterol    • History of bilateral breast implants    • Iron deficiency anemia    • Lumbar stenosis     AND CERVICAL   • Lung cancer (CMS/HCC)     RIGHT UPPER LOBE   • Murmur, heart    • Periodic heart  "flutter     2O PLUS YEARS AGO   • Right knee pain    • Slow to wake up after anesthesia    • Slow to wake up after anesthesia    • Spinal stenosis    • Wears contact lenses        PF/Surg/Soc Hx:     Past Surgical History:   Procedure Laterality Date   • APPENDECTOMY     • BUNIONECTOMY      X 2   • HYSTERECTOMY     • LASIK     • THORACOSCOPY Right 2017    Procedure: BRONCHOSCOPY, RIGHT VAT, ROBOT ASSISTED RIGHT UPPER LOBECTOMY, INTERCOSTAL NERVE BLOCK WITH CRYOA;  Surgeon: Yuri Brizuela III, MD;  Location: [facility] OR;  Service:    • THYROID SURGERY      nodule removed   • TONSILLECTOMY AND ADENOIDECTOMY          Social History     Occupational History   • Not on file   Tobacco Use   • Smoking status: Former Smoker     Packs/day: 2.00     Years: 20.00     Pack years: 40.00     Types: Cigarettes     Quit date:      Years since quittin.5   • Tobacco comment: QUIT OVER 30 YEARS AGO   Vaping Use   • Vaping Use: Never used   Substance and Sexual Activity   • Alcohol use: Yes     Comment: socially   • Drug use: No   • Sexual activity: Defer     Partners: Female     Birth control/protection: Post-menopausal      Social History     Social History Narrative   • Not on file        Family History   Problem Relation Age of Onset   • Lung cancer Mother    • Lung cancer Father    • Lung cancer Sister    • Malig Hyperthermia Neg Hx          Review of Systems:   A 14 point review of systems was performed, pertinent positives discussed above, all other systems are negative    Physical Exam: 76 y.o. female  Vital Signs :   Vitals:    21 1625   Temp: 98 °F (36.7 °C)   TempSrc: Temporal   Weight: 64.9 kg (143 lb)   Height: 167.6 cm (66\")     General: Alert and Oriented x 3, No acute distress.  Psych: mood and affect appropriate; recent and remote memory intact  Eyes: conjunctiva clear; pupils equally round and reactive, sclera nonicteric  CV: no peripheral edema  Resp: normal respiratory effort  Skin: no " rashes or wounds; normal turgor  Musculosketetal; pain and crepitance with knee range of motion  Vascular: palpable distal pulses    Xrays:  -3 views (AP, lateral, and sunrise) were reviewed demonstrating end-stage OA with bone on bone articulation.  -A full length AP xray was ordered and reviewed today for purposes of operative alignment demonstrating end stage arthritic findings. There are no previous full length films for review    Assessment:  End-stage right knee osteoarthritis. Conservative measures have failed.      Plan:  The plan is to proceed with right total Knee Replacement. The patient voiced understanding of the risks, benefits, and alternative forms of treatment that were discussed with Dr Vela at the time of scheduling. 23     Cass Burgos, APRN  7/1/2021

## 2021-07-01 NOTE — H&P (VIEW-ONLY)
Patient: Jerilyn Martinez    YOB: 1945    Medical Record Number: [id number]    Admitting Physician: Dr. oMnroe Vela    Reason for Admission: End Stage right knee OA    History of Present Illness: 76 y.o. female presents with severe end stage knee osteoarthritis which has not been responsive to the full compliment of conservative measures. Despite conservative attempts, there is still severe, constant activity limiting pain. Given the severity of the pain, the patient has elected to proceed with knee replacement.    Allergies:   Allergies   Allergen Reactions   • Codeine Shortness Of Breath     CHEST PAIN           Current Medications:  Home Medications:    Current Outpatient Medications on File Prior to Visit   Medication Sig   • Chlorhexidine Gluconate Cloth 2 % pads Apply 1 application topically. AS DIRECTED   • cyanocobalamin 100 MCG tablet Take 100 mcg by mouth Daily.   • levothyroxine (SYNTHROID, LEVOTHROID) 75 MCG tablet Take 75 mcg by mouth Daily.   • meloxicam (Mobic) 15 MG tablet Take 1 tablet by mouth Daily. (Patient taking differently: Take 15 mg by mouth Daily. HOLD FOR SURGERY)   • Multiple Vitamin (MULTIVITAMIN) capsule Take 1 capsule by mouth Daily.   • mupirocin (BACTROBAN) 2 % ointment Apply 1 application topically to the appropriate area as directed. AS DIRECTED   • omeprazole (priLOSEC) 20 MG capsule Take 20 mg by mouth Daily.   • simvastatin (ZOCOR) 20 MG tablet 20 mg Every Night.     Current Facility-Administered Medications on File Prior to Visit   Medication   • Chlorhexidine Gluconate Cloth 2 % pads     PRN Meds:.    PMH:     Past Medical History:   Diagnosis Date   • Arthritis    • Disease of thyroid gland     HYPOTHYROIDISM   • GERD (gastroesophageal reflux disease)    • High cholesterol    • History of bilateral breast implants    • Iron deficiency anemia    • Lumbar stenosis     AND CERVICAL   • Lung cancer (CMS/HCC)     RIGHT UPPER LOBE   • Murmur, heart    • Periodic heart  "flutter     2O PLUS YEARS AGO   • Right knee pain    • Slow to wake up after anesthesia    • Slow to wake up after anesthesia    • Spinal stenosis    • Wears contact lenses        PF/Surg/Soc Hx:     Past Surgical History:   Procedure Laterality Date   • APPENDECTOMY     • BUNIONECTOMY      X 2   • HYSTERECTOMY     • LASIK     • THORACOSCOPY Right 2017    Procedure: BRONCHOSCOPY, RIGHT VAT, ROBOT ASSISTED RIGHT UPPER LOBECTOMY, INTERCOSTAL NERVE BLOCK WITH CRYOA;  Surgeon: Yuri Brizuela III, MD;  Location: [facility] OR;  Service:    • THYROID SURGERY      nodule removed   • TONSILLECTOMY AND ADENOIDECTOMY          Social History     Occupational History   • Not on file   Tobacco Use   • Smoking status: Former Smoker     Packs/day: 2.00     Years: 20.00     Pack years: 40.00     Types: Cigarettes     Quit date:      Years since quittin.5   • Tobacco comment: QUIT OVER 30 YEARS AGO   Vaping Use   • Vaping Use: Never used   Substance and Sexual Activity   • Alcohol use: Yes     Comment: socially   • Drug use: No   • Sexual activity: Defer     Partners: Female     Birth control/protection: Post-menopausal      Social History     Social History Narrative   • Not on file        Family History   Problem Relation Age of Onset   • Lung cancer Mother    • Lung cancer Father    • Lung cancer Sister    • Malig Hyperthermia Neg Hx          Review of Systems:   A 14 point review of systems was performed, pertinent positives discussed above, all other systems are negative    Physical Exam: 76 y.o. female  Vital Signs :   Vitals:    21 1625   Temp: 98 °F (36.7 °C)   TempSrc: Temporal   Weight: 64.9 kg (143 lb)   Height: 167.6 cm (66\")     General: Alert and Oriented x 3, No acute distress.  Psych: mood and affect appropriate; recent and remote memory intact  Eyes: conjunctiva clear; pupils equally round and reactive, sclera nonicteric  CV: no peripheral edema  Resp: normal respiratory effort  Skin: no " rashes or wounds; normal turgor  Musculosketetal; pain and crepitance with knee range of motion  Vascular: palpable distal pulses    Xrays:  -3 views (AP, lateral, and sunrise) were reviewed demonstrating end-stage OA with bone on bone articulation.  -A full length AP xray was ordered and reviewed today for purposes of operative alignment demonstrating end stage arthritic findings. There are no previous full length films for review    Assessment:  End-stage right knee osteoarthritis. Conservative measures have failed.      Plan:  The plan is to proceed with right total Knee Replacement. The patient voiced understanding of the risks, benefits, and alternative forms of treatment that were discussed with Dr Vela at the time of scheduling. 23     Cass Burgos, APRN  7/1/2021

## 2021-07-09 NOTE — CASE MANAGEMENT/SOCIAL WORK
Pre Op Ortho Assessment    [facility]     Patient Name: Jerilyn Martinez  MRN: [id number]  Today's Date: 7/9/2021        PRE-OPERATIVE ORTHOPEDIC ASSESSMENT     Row Name 07/09/21 1100       Question    What is your age group?  1    Gender?  1    How far on average can you walk? (a block is 200 meters)  2    Which gait aid do you use? (more often than not)  2    Do you use community supports: (home help, meals on wheels, district nursing)  1    Will you live with someone who can care for you after your operation?  3    Your Score (out of 12)  10       Discharge Disposition/Planning:    Discussed the predicted discharge disposition needed based on RAPT Assessment with the patient  Yes    Patient Selected Discharge Disposition:  Home    Outpatient Rehabilitation Facility of Choice:  other *see comment undertermined    Home Health Services Preferred:  -- [facility]    Post-operative Caregiver Name and Phone Number  Child daughter Heidy Blas [phone]       Home Equipment    Does patient have a walker for home use?  Yes    Does patient have a 3 in 1 commode for home use?  No    Does patient have a shower chair for home use?  No    Does patient have an elevated commode seat for home use?  No    Does patient have a cane for home use?  No    Is there any other medical equipment in the home?  No       Pre-Operative Class Attendance    Attended or scheduled to attend the pre-operative class within 1 year of total joint replacement?  Yes       Patient Education    Expected time of discharge discussed  Yes    Encouraged to attend pre-operative class  Yes    Education regarding predicted discharge disposition based on RAPT score  Yes    Patient receptive and voiced understanding of information given  Yes        Plan is home with daughter and outpatient PT.  Undetermined on what facility for PT.  She has no stairs in her house.  She does have a walker but no other DME.  She has taken the class.    Shannon Epley, RN

## 2021-07-14 ENCOUNTER — LAB (OUTPATIENT)
Dept: LAB | Facility: HOSPITAL | Age: 76
End: 2021-07-14

## 2021-07-14 LAB — SARS-COV-2 ORF1AB RESP QL NAA+PROBE: NOT DETECTED

## 2021-07-14 PROCEDURE — U0005 INFEC AGEN DETEC AMPLI PROBE: HCPCS

## 2021-07-14 PROCEDURE — C9803 HOPD COVID-19 SPEC COLLECT: HCPCS

## 2021-07-14 PROCEDURE — U0004 COV-19 TEST NON-CDC HGH THRU: HCPCS

## 2021-07-16 ENCOUNTER — ANESTHESIA EVENT (OUTPATIENT)
Dept: PERIOP | Facility: HOSPITAL | Age: 76
End: 2021-07-16

## 2021-07-16 ENCOUNTER — APPOINTMENT (OUTPATIENT)
Dept: GENERAL RADIOLOGY | Facility: HOSPITAL | Age: 76
End: 2021-07-16

## 2021-07-16 ENCOUNTER — ANESTHESIA (OUTPATIENT)
Dept: PERIOP | Facility: HOSPITAL | Age: 76
End: 2021-07-16

## 2021-07-16 ENCOUNTER — HOSPITAL ENCOUNTER (OUTPATIENT)
Facility: HOSPITAL | Age: 76
Discharge: HOME HEALTH | End: 2021-07-17
Attending: ORTHOPAEDIC SURGERY | Admitting: ORTHOPAEDIC SURGERY

## 2021-07-16 DIAGNOSIS — M25.561 CHRONIC PAIN OF RIGHT KNEE: ICD-10-CM

## 2021-07-16 DIAGNOSIS — G89.29 CHRONIC PAIN OF RIGHT KNEE: ICD-10-CM

## 2021-07-16 DIAGNOSIS — Z96.651 S/P TKR (TOTAL KNEE REPLACEMENT) USING CEMENT, RIGHT: Primary | ICD-10-CM

## 2021-07-16 PROCEDURE — C1776 JOINT DEVICE (IMPLANTABLE): HCPCS | Performed by: ORTHOPAEDIC SURGERY

## 2021-07-16 PROCEDURE — 27447 TOTAL KNEE ARTHROPLASTY: CPT | Performed by: ORTHOPAEDIC SURGERY

## 2021-07-16 PROCEDURE — G0378 HOSPITAL OBSERVATION PER HR: HCPCS

## 2021-07-16 PROCEDURE — 73560 X-RAY EXAM OF KNEE 1 OR 2: CPT

## 2021-07-16 PROCEDURE — 94799 UNLISTED PULMONARY SVC/PX: CPT

## 2021-07-16 PROCEDURE — 63710000001 POLYETHYLENE GLYCOL 17 G PACK: Performed by: NURSE PRACTITIONER

## 2021-07-16 PROCEDURE — C1889 IMPLANT/INSERT DEVICE, NOC: HCPCS | Performed by: ORTHOPAEDIC SURGERY

## 2021-07-16 PROCEDURE — 27447 TOTAL KNEE ARTHROPLASTY: CPT | Performed by: NURSE PRACTITIONER

## 2021-07-16 PROCEDURE — 25010000003 BUPIVACAINE LIPOSOME 1.3 % SUSPENSION 20 ML VIAL: Performed by: ORTHOPAEDIC SURGERY

## 2021-07-16 PROCEDURE — 25010000003 CEFAZOLIN IN DEXTROSE 2-4 GM/100ML-% SOLUTION: Performed by: NURSE PRACTITIONER

## 2021-07-16 PROCEDURE — 25010000002 PROPOFOL 10 MG/ML EMULSION: Performed by: NURSE ANESTHETIST, CERTIFIED REGISTERED

## 2021-07-16 PROCEDURE — A9270 NON-COVERED ITEM OR SERVICE: HCPCS | Performed by: NURSE PRACTITIONER

## 2021-07-16 PROCEDURE — 63710000001 HYDROCODONE-ACETAMINOPHEN 7.5-325 MG TABLET: Performed by: NURSE PRACTITIONER

## 2021-07-16 PROCEDURE — 25010000002 DEXAMETHASONE PER 1 MG: Performed by: NURSE ANESTHETIST, CERTIFIED REGISTERED

## 2021-07-16 PROCEDURE — 63710000001 MUPIROCIN 2 % OINTMENT: Performed by: NURSE PRACTITIONER

## 2021-07-16 PROCEDURE — 25010000002 VANCOMYCIN PER 500 MG: Performed by: NURSE PRACTITIONER

## 2021-07-16 PROCEDURE — C1713 ANCHOR/SCREW BN/BN,TIS/BN: HCPCS | Performed by: ORTHOPAEDIC SURGERY

## 2021-07-16 PROCEDURE — 25010000002 FENTANYL CITRATE (PF) 50 MCG/ML SOLUTION: Performed by: NURSE ANESTHETIST, CERTIFIED REGISTERED

## 2021-07-16 PROCEDURE — 25010000002 MIDAZOLAM PER 1 MG: Performed by: ANESTHESIOLOGY

## 2021-07-16 PROCEDURE — C9290 INJ, BUPIVACAINE LIPOSOME: HCPCS | Performed by: ORTHOPAEDIC SURGERY

## 2021-07-16 DEVICE — GENESIS II BICONVEX PATELLA 29MM
Type: IMPLANTABLE DEVICE | Site: KNEE | Status: FUNCTIONAL
Brand: GENESIS II

## 2021-07-16 DEVICE — GENESIS II NON-POROUS TIBIAL                                    BASEPLATE SIZE 4 RIGHT
Type: IMPLANTABLE DEVICE | Site: KNEE | Status: FUNCTIONAL
Brand: GENESIS II

## 2021-07-16 DEVICE — LEGION CRUCIATE RETAINING HIGH                                    FLEX HIGHLY CROSS LINKED                                    POLYETHYLENE SIZE 3-4 9MM
Type: IMPLANTABLE DEVICE | Site: KNEE | Status: FUNCTIONAL
Brand: LEGION

## 2021-07-16 DEVICE — DEV CONTRL TISS STRATAFIX SYMM PDS PLUS VIL CT-1 60CM: Type: IMPLANTABLE DEVICE | Site: KNEE | Status: FUNCTIONAL

## 2021-07-16 DEVICE — LEGION CRUCIATE RETAINING OXINIUM                                    FEMORAL SIZE 4 RIGHT
Type: IMPLANTABLE DEVICE | Site: KNEE | Status: FUNCTIONAL
Brand: LEGION

## 2021-07-16 DEVICE — DEV CONTRL TISS STRATAFIX SPIRAL MNCRYL UD 3/0 PLS 30CM: Type: IMPLANTABLE DEVICE | Site: KNEE | Status: FUNCTIONAL

## 2021-07-16 DEVICE — IMPLANTABLE DEVICE: Type: IMPLANTABLE DEVICE | Site: KNEE | Status: FUNCTIONAL

## 2021-07-16 DEVICE — PALACOS® R IS A FAST-CURING, RADIOPAQUE, POLY(METHYL METHACRYLATE)-BASED BONE CEMENT.PALACOS ® R CONTAINS THE X-RAY CONTRAST MEDIUM ZIRCONIUM DIOXIDE. TO IMPROVE VISIBILITY IN THE SURGICAL FIELD PALACOS ® R HAS BEEN COLOURED WITH CHLOROPHYLL (E141). THE BONE CEMENT IS PREPARED DIRECTLY BEFORE USE BY MIXING A POLYMER POWDER COMPONENT WITH A LIQUID MONOMER COMPONENT. A DUCTILE DOUGH FORMS WHICH CURES WITHIN A FEW MINUTES.
Type: IMPLANTABLE DEVICE | Site: KNEE | Status: FUNCTIONAL
Brand: PALACOS®

## 2021-07-16 RX ORDER — MELOXICAM 15 MG/1
15 TABLET ORAL DAILY
Status: DISCONTINUED | OUTPATIENT
Start: 2021-07-17 | End: 2021-07-17 | Stop reason: HOSPADM

## 2021-07-16 RX ORDER — IBUPROFEN 600 MG/1
600 TABLET ORAL ONCE AS NEEDED
Status: DISCONTINUED | OUTPATIENT
Start: 2021-07-16 | End: 2021-07-16 | Stop reason: HOSPADM

## 2021-07-16 RX ORDER — PROMETHAZINE HYDROCHLORIDE 25 MG/1
25 SUPPOSITORY RECTAL ONCE AS NEEDED
Status: DISCONTINUED | OUTPATIENT
Start: 2021-07-16 | End: 2021-07-16 | Stop reason: HOSPADM

## 2021-07-16 RX ORDER — SODIUM CHLORIDE 0.9 % (FLUSH) 0.9 %
3-10 SYRINGE (ML) INJECTION AS NEEDED
Status: DISCONTINUED | OUTPATIENT
Start: 2021-07-16 | End: 2021-07-16 | Stop reason: HOSPADM

## 2021-07-16 RX ORDER — ONDANSETRON 2 MG/ML
4 INJECTION INTRAMUSCULAR; INTRAVENOUS EVERY 6 HOURS PRN
Status: DISCONTINUED | OUTPATIENT
Start: 2021-07-16 | End: 2021-07-17 | Stop reason: HOSPADM

## 2021-07-16 RX ORDER — HYDROCODONE BITARTRATE AND ACETAMINOPHEN 7.5; 325 MG/1; MG/1
1 TABLET ORAL EVERY 4 HOURS PRN
Status: DISCONTINUED | OUTPATIENT
Start: 2021-07-16 | End: 2021-07-17 | Stop reason: HOSPADM

## 2021-07-16 RX ORDER — MIDAZOLAM HYDROCHLORIDE 1 MG/ML
0.5 INJECTION INTRAMUSCULAR; INTRAVENOUS
Status: DISCONTINUED | OUTPATIENT
Start: 2021-07-16 | End: 2021-07-16 | Stop reason: HOSPADM

## 2021-07-16 RX ORDER — ONDANSETRON 2 MG/ML
4 INJECTION INTRAMUSCULAR; INTRAVENOUS ONCE AS NEEDED
Status: DISCONTINUED | OUTPATIENT
Start: 2021-07-16 | End: 2021-07-16 | Stop reason: HOSPADM

## 2021-07-16 RX ORDER — FENTANYL CITRATE 50 UG/ML
50 INJECTION, SOLUTION INTRAMUSCULAR; INTRAVENOUS
Status: DISCONTINUED | OUTPATIENT
Start: 2021-07-16 | End: 2021-07-16 | Stop reason: HOSPADM

## 2021-07-16 RX ORDER — PROMETHAZINE HYDROCHLORIDE 25 MG/1
25 TABLET ORAL ONCE AS NEEDED
Status: DISCONTINUED | OUTPATIENT
Start: 2021-07-16 | End: 2021-07-16 | Stop reason: HOSPADM

## 2021-07-16 RX ORDER — ONDANSETRON 4 MG/1
4 TABLET, FILM COATED ORAL EVERY 6 HOURS PRN
Status: DISCONTINUED | OUTPATIENT
Start: 2021-07-16 | End: 2021-07-17 | Stop reason: HOSPADM

## 2021-07-16 RX ORDER — PROPOFOL 10 MG/ML
VIAL (ML) INTRAVENOUS CONTINUOUS PRN
Status: DISCONTINUED | OUTPATIENT
Start: 2021-07-16 | End: 2021-07-16 | Stop reason: SURG

## 2021-07-16 RX ORDER — DIPHENHYDRAMINE HYDROCHLORIDE 50 MG/ML
12.5 INJECTION INTRAMUSCULAR; INTRAVENOUS
Status: DISCONTINUED | OUTPATIENT
Start: 2021-07-16 | End: 2021-07-16 | Stop reason: HOSPADM

## 2021-07-16 RX ORDER — ASPIRIN 81 MG/1
81 TABLET ORAL EVERY 12 HOURS SCHEDULED
Status: DISCONTINUED | OUTPATIENT
Start: 2021-07-17 | End: 2021-07-17 | Stop reason: HOSPADM

## 2021-07-16 RX ORDER — POLYETHYLENE GLYCOL 3350 17 G/17G
17 POWDER, FOR SOLUTION ORAL 2 TIMES DAILY
Status: DISCONTINUED | OUTPATIENT
Start: 2021-07-16 | End: 2021-07-17 | Stop reason: HOSPADM

## 2021-07-16 RX ORDER — PROMETHAZINE HYDROCHLORIDE 12.5 MG/1
12.5 TABLET ORAL EVERY 4 HOURS PRN
Status: DISCONTINUED | OUTPATIENT
Start: 2021-07-16 | End: 2021-07-17 | Stop reason: HOSPADM

## 2021-07-16 RX ORDER — PREGABALIN 75 MG/1
150 CAPSULE ORAL ONCE
Status: COMPLETED | OUTPATIENT
Start: 2021-07-16 | End: 2021-07-16

## 2021-07-16 RX ORDER — SODIUM CHLORIDE, SODIUM LACTATE, POTASSIUM CHLORIDE, CALCIUM CHLORIDE 600; 310; 30; 20 MG/100ML; MG/100ML; MG/100ML; MG/100ML
9 INJECTION, SOLUTION INTRAVENOUS CONTINUOUS
Status: DISCONTINUED | OUTPATIENT
Start: 2021-07-16 | End: 2021-07-16 | Stop reason: HOSPADM

## 2021-07-16 RX ORDER — DIPHENHYDRAMINE HCL 25 MG
25 CAPSULE ORAL
Status: DISCONTINUED | OUTPATIENT
Start: 2021-07-16 | End: 2021-07-16 | Stop reason: HOSPADM

## 2021-07-16 RX ORDER — LEVOTHYROXINE SODIUM 0.07 MG/1
75 TABLET ORAL
Status: DISCONTINUED | OUTPATIENT
Start: 2021-07-17 | End: 2021-07-17 | Stop reason: HOSPADM

## 2021-07-16 RX ORDER — LABETALOL HYDROCHLORIDE 5 MG/ML
5 INJECTION, SOLUTION INTRAVENOUS
Status: DISCONTINUED | OUTPATIENT
Start: 2021-07-16 | End: 2021-07-16 | Stop reason: HOSPADM

## 2021-07-16 RX ORDER — POLYETHYLENE GLYCOL 3350 17 G/17G
17 POWDER, FOR SOLUTION ORAL 2 TIMES DAILY
Qty: 238 G | Refills: 0 | Status: SHIPPED | OUTPATIENT
Start: 2021-07-16 | End: 2021-07-24

## 2021-07-16 RX ORDER — FLUMAZENIL 0.1 MG/ML
0.2 INJECTION INTRAVENOUS AS NEEDED
Status: DISCONTINUED | OUTPATIENT
Start: 2021-07-16 | End: 2021-07-16 | Stop reason: HOSPADM

## 2021-07-16 RX ORDER — LIDOCAINE HYDROCHLORIDE 10 MG/ML
0.5 INJECTION, SOLUTION EPIDURAL; INFILTRATION; INTRACAUDAL; PERINEURAL ONCE AS NEEDED
Status: DISCONTINUED | OUTPATIENT
Start: 2021-07-16 | End: 2021-07-16 | Stop reason: HOSPADM

## 2021-07-16 RX ORDER — CEFAZOLIN SODIUM 2 G/100ML
2 INJECTION, SOLUTION INTRAVENOUS EVERY 8 HOURS
Status: COMPLETED | OUTPATIENT
Start: 2021-07-16 | End: 2021-07-17

## 2021-07-16 RX ORDER — SODIUM CHLORIDE 0.9 % (FLUSH) 0.9 %
3 SYRINGE (ML) INJECTION EVERY 12 HOURS SCHEDULED
Status: DISCONTINUED | OUTPATIENT
Start: 2021-07-16 | End: 2021-07-16 | Stop reason: HOSPADM

## 2021-07-16 RX ORDER — HYDRALAZINE HYDROCHLORIDE 20 MG/ML
5 INJECTION INTRAMUSCULAR; INTRAVENOUS
Status: DISCONTINUED | OUTPATIENT
Start: 2021-07-16 | End: 2021-07-16 | Stop reason: HOSPADM

## 2021-07-16 RX ORDER — MELOXICAM 15 MG/1
15 TABLET ORAL ONCE
Status: COMPLETED | OUTPATIENT
Start: 2021-07-16 | End: 2021-07-16

## 2021-07-16 RX ORDER — ONDANSETRON 4 MG/1
4 TABLET, FILM COATED ORAL EVERY 8 HOURS PRN
Qty: 10 TABLET | Refills: 0 | Status: SHIPPED | OUTPATIENT
Start: 2021-07-16 | End: 2021-11-18

## 2021-07-16 RX ORDER — ASPIRIN 81 MG/1
TABLET ORAL
Qty: 60 TABLET | Refills: 0 | Status: SHIPPED | OUTPATIENT
Start: 2021-07-16 | End: 2021-07-27

## 2021-07-16 RX ORDER — VANCOMYCIN HYDROCHLORIDE 1 G/200ML
15 INJECTION, SOLUTION INTRAVENOUS ONCE
Status: COMPLETED | OUTPATIENT
Start: 2021-07-16 | End: 2021-07-16

## 2021-07-16 RX ORDER — EPHEDRINE SULFATE 50 MG/ML
5 INJECTION, SOLUTION INTRAVENOUS ONCE AS NEEDED
Status: DISCONTINUED | OUTPATIENT
Start: 2021-07-16 | End: 2021-07-16 | Stop reason: HOSPADM

## 2021-07-16 RX ORDER — MAGNESIUM HYDROXIDE 1200 MG/15ML
LIQUID ORAL AS NEEDED
Status: DISCONTINUED | OUTPATIENT
Start: 2021-07-16 | End: 2021-07-16 | Stop reason: HOSPADM

## 2021-07-16 RX ORDER — HYDROCODONE BITARTRATE AND ACETAMINOPHEN 7.5; 325 MG/1; MG/1
TABLET ORAL
Qty: 60 TABLET | Refills: 0 | Status: SHIPPED | OUTPATIENT
Start: 2021-07-16 | End: 2021-07-29 | Stop reason: SDUPTHER

## 2021-07-16 RX ORDER — UREA 10 %
3 LOTION (ML) TOPICAL NIGHTLY PRN
Status: DISCONTINUED | OUTPATIENT
Start: 2021-07-16 | End: 2021-07-17 | Stop reason: HOSPADM

## 2021-07-16 RX ORDER — NALOXONE HCL 0.4 MG/ML
0.2 VIAL (ML) INJECTION AS NEEDED
Status: DISCONTINUED | OUTPATIENT
Start: 2021-07-16 | End: 2021-07-16 | Stop reason: HOSPADM

## 2021-07-16 RX ORDER — CEFAZOLIN SODIUM 2 G/100ML
2 INJECTION, SOLUTION INTRAVENOUS ONCE
Status: COMPLETED | OUTPATIENT
Start: 2021-07-16 | End: 2021-07-16

## 2021-07-16 RX ORDER — DEXAMETHASONE SODIUM PHOSPHATE 4 MG/ML
INJECTION, SOLUTION INTRA-ARTICULAR; INTRALESIONAL; INTRAMUSCULAR; INTRAVENOUS; SOFT TISSUE AS NEEDED
Status: DISCONTINUED | OUTPATIENT
Start: 2021-07-16 | End: 2021-07-16 | Stop reason: SURG

## 2021-07-16 RX ORDER — TRANEXAMIC ACID 100 MG/ML
INJECTION, SOLUTION INTRAVENOUS AS NEEDED
Status: DISCONTINUED | OUTPATIENT
Start: 2021-07-16 | End: 2021-07-16 | Stop reason: SURG

## 2021-07-16 RX ORDER — LIDOCAINE HYDROCHLORIDE 20 MG/ML
INJECTION, SOLUTION INFILTRATION; PERINEURAL AS NEEDED
Status: DISCONTINUED | OUTPATIENT
Start: 2021-07-16 | End: 2021-07-16 | Stop reason: SURG

## 2021-07-16 RX ORDER — IPRATROPIUM BROMIDE AND ALBUTEROL SULFATE 2.5; .5 MG/3ML; MG/3ML
3 SOLUTION RESPIRATORY (INHALATION) ONCE AS NEEDED
Status: DISCONTINUED | OUTPATIENT
Start: 2021-07-16 | End: 2021-07-16 | Stop reason: HOSPADM

## 2021-07-16 RX ORDER — FAMOTIDINE 10 MG/ML
20 INJECTION, SOLUTION INTRAVENOUS ONCE
Status: COMPLETED | OUTPATIENT
Start: 2021-07-16 | End: 2021-07-16

## 2021-07-16 RX ORDER — BUPIVACAINE HYDROCHLORIDE 7.5 MG/ML
INJECTION, SOLUTION EPIDURAL; RETROBULBAR
Status: COMPLETED | OUTPATIENT
Start: 2021-07-16 | End: 2021-07-16

## 2021-07-16 RX ORDER — FENTANYL CITRATE 50 UG/ML
INJECTION, SOLUTION INTRAMUSCULAR; INTRAVENOUS AS NEEDED
Status: DISCONTINUED | OUTPATIENT
Start: 2021-07-16 | End: 2021-07-16 | Stop reason: SURG

## 2021-07-16 RX ORDER — HYDROMORPHONE HYDROCHLORIDE 1 MG/ML
0.5 INJECTION, SOLUTION INTRAMUSCULAR; INTRAVENOUS; SUBCUTANEOUS
Status: DISCONTINUED | OUTPATIENT
Start: 2021-07-16 | End: 2021-07-16 | Stop reason: HOSPADM

## 2021-07-16 RX ORDER — HYDROCODONE BITARTRATE AND ACETAMINOPHEN 7.5; 325 MG/1; MG/1
2 TABLET ORAL EVERY 4 HOURS PRN
Status: DISCONTINUED | OUTPATIENT
Start: 2021-07-16 | End: 2021-07-17 | Stop reason: HOSPADM

## 2021-07-16 RX ORDER — PANTOPRAZOLE SODIUM 40 MG/1
40 TABLET, DELAYED RELEASE ORAL EVERY MORNING
Status: DISCONTINUED | OUTPATIENT
Start: 2021-07-17 | End: 2021-07-17 | Stop reason: HOSPADM

## 2021-07-16 RX ORDER — MELOXICAM 15 MG/1
15 TABLET ORAL DAILY
Qty: 14 TABLET | Refills: 0 | Status: SHIPPED | OUTPATIENT
Start: 2021-07-16 | End: 2021-07-27

## 2021-07-16 RX ADMIN — PREGABALIN 150 MG: 75 CAPSULE ORAL at 11:16

## 2021-07-16 RX ADMIN — FENTANYL CITRATE 50 MCG: 50 INJECTION INTRAMUSCULAR; INTRAVENOUS at 12:30

## 2021-07-16 RX ADMIN — FAMOTIDINE 20 MG: 10 INJECTION INTRAVENOUS at 11:29

## 2021-07-16 RX ADMIN — FENTANYL CITRATE 50 MCG: 50 INJECTION, SOLUTION INTRAMUSCULAR; INTRAVENOUS at 15:44

## 2021-07-16 RX ADMIN — POLYETHYLENE GLYCOL 3350 17 G: 17 POWDER, FOR SOLUTION ORAL at 20:27

## 2021-07-16 RX ADMIN — TRANEXAMIC ACID 1000 MG: 1 INJECTION, SOLUTION INTRAVENOUS at 13:35

## 2021-07-16 RX ADMIN — FENTANYL CITRATE 50 MCG: 50 INJECTION INTRAMUSCULAR; INTRAVENOUS at 12:34

## 2021-07-16 RX ADMIN — CEFAZOLIN SODIUM 2 G: 2 INJECTION, SOLUTION INTRAVENOUS at 20:27

## 2021-07-16 RX ADMIN — FENTANYL CITRATE 50 MCG: 50 INJECTION, SOLUTION INTRAMUSCULAR; INTRAVENOUS at 16:02

## 2021-07-16 RX ADMIN — BUPIVACAINE HYDROCHLORIDE 1.6 ML: 7.5 INJECTION, SOLUTION EPIDURAL; RETROBULBAR at 12:35

## 2021-07-16 RX ADMIN — MELOXICAM 15 MG: 15 TABLET ORAL at 11:16

## 2021-07-16 RX ADMIN — MUPIROCIN 1 APPLICATION: 20 OINTMENT TOPICAL at 20:27

## 2021-07-16 RX ADMIN — PROPOFOL 140 MCG/KG/MIN: 10 INJECTION, EMULSION INTRAVENOUS at 12:40

## 2021-07-16 RX ADMIN — DEXAMETHASONE SODIUM PHOSPHATE 6 MG: 4 INJECTION, SOLUTION INTRAMUSCULAR; INTRAVENOUS at 12:41

## 2021-07-16 RX ADMIN — HYDROCODONE BITARTRATE AND ACETAMINOPHEN 2 TABLET: 7.5; 325 TABLET ORAL at 22:05

## 2021-07-16 RX ADMIN — SODIUM CHLORIDE, POTASSIUM CHLORIDE, SODIUM LACTATE AND CALCIUM CHLORIDE 9 ML/HR: 600; 310; 30; 20 INJECTION, SOLUTION INTRAVENOUS at 11:15

## 2021-07-16 RX ADMIN — LIDOCAINE HYDROCHLORIDE 60 MG: 20 INJECTION, SOLUTION INFILTRATION; PERINEURAL at 12:40

## 2021-07-16 RX ADMIN — CEFAZOLIN SODIUM 2 G: 2 INJECTION, SOLUTION INTRAVENOUS at 12:20

## 2021-07-16 RX ADMIN — SODIUM CHLORIDE, POTASSIUM CHLORIDE, SODIUM LACTATE AND CALCIUM CHLORIDE: 600; 310; 30; 20 INJECTION, SOLUTION INTRAVENOUS at 13:40

## 2021-07-16 RX ADMIN — MIDAZOLAM 0.5 MG: 1 INJECTION INTRAMUSCULAR; INTRAVENOUS at 11:29

## 2021-07-16 RX ADMIN — VANCOMYCIN HYDROCHLORIDE 1000 MG: 1 INJECTION, SOLUTION INTRAVENOUS at 11:16

## 2021-07-16 NOTE — ANESTHESIA PROCEDURE NOTES
Spinal Block      Patient reassessed immediately prior to procedure    Patient location during procedure: OR  Start Time: 7/16/2021 12:30 PM  Stop Time: 7/16/2021 12:35 PM  Indication:at surgeon's request  Performed By  Anesthesiologist: Jean-Paul Olivier MD  Preanesthetic Checklist  Completed: patient identified, IV checked, site marked, risks and benefits discussed, surgical consent, monitors and equipment checked, pre-op evaluation and timeout performed  Spinal Block Prep:  Patient Position:sitting  Sterile Tech:cap, gloves, mask and sterile barriers  Prep:Betadine  Patient Monitoring:blood pressure monitoring, continuous pulse oximetry and EKG  Spinal Block Procedure  Approach:midline  Guidance:landmark technique and palpation technique  Location:L4-L5  Needle Type:Devang  Needle Gauge:24 G    Fluid Appearance:clear  Medications: bupivacaine PF (MARCAINE) 0.75 % injection, 1.6 mL  Med Administered at 7/16/2021 12:35 PM   Post Assessment  Patient Tolerance:patient tolerated the procedure well with no apparent complications  Complications no

## 2021-07-16 NOTE — ANESTHESIA POSTPROCEDURE EVALUATION
Patient: Jerilyn Martinez    Procedure Summary     Date: 07/16/21 Room / Location: [facility] OR [address] [place] [address] MAIN OR    Anesthesia Start: 1224 Anesthesia Stop: 1431    Procedure: TOTAL KNEE ARTHROPLASTY (Right Knee) Diagnosis:       Chronic pain of right knee      (Chronic pain of right knee [M25.561, G89.29])    Surgeons: Monroe Vela MD Provider: Jean-Paul Olivier MD    Anesthesia Type: spinal ASA Status: 3          Anesthesia Type: spinal    Vitals  Vitals Value Taken Time   /84 07/16/21 1600   Temp 36.4 °C (97.5 °F) 07/16/21 1429   Pulse 67 07/16/21 1605   Resp 16 07/16/21 1515   SpO2 99 % 07/16/21 1605   Vitals shown include unvalidated device data.        Post Anesthesia Care and Evaluation    Patient location during evaluation: PACU  Patient participation: complete - patient participated  Level of consciousness: awake  Pain score: 1  Pain management: adequate  Airway patency: patent  Anesthetic complications: No anesthetic complications  PONV Status: none  Cardiovascular status: acceptable  Respiratory status: acceptable  Hydration status: acceptable

## 2021-07-16 NOTE — ANESTHESIA PREPROCEDURE EVALUATION
Anesthesia Evaluation     Patient summary reviewed and Nursing notes reviewed   history of anesthetic complications: prolonged sedation  NPO Solid Status: > 8 hours  NPO Liquid Status: > 4 hours           Airway   Mallampati: II  Neck ROM: full  No difficulty expected  Dental - normal exam     Pulmonary     breath sounds clear to auscultation  (+) a smoker Former, lung cancer, COPD,   Cardiovascular     Rhythm: regular    (+) valvular problems/murmurs murmur, hyperlipidemia,       Neuro/Psych  GI/Hepatic/Renal/Endo    (+)  GERD,      Musculoskeletal     Abdominal    Substance History      OB/GYN          Other   arthritis,    history of cancer                    Anesthesia Plan    ASA 3     spinal       Anesthetic plan, all risks, benefits, and alternatives have been provided, discussed and informed consent has been obtained with: patient.

## 2021-07-16 NOTE — OP NOTE
Name: Jerilyn Martinez  YOB: 1945    DATE OF SURGERY: 7/16/2021    PREOPERATIVE DIAGNOSIS: Right knee end-stage osteoarthritis    POSTOPERATIVE DIAGNOSIS: Right knee end-stage osteoarthritis    PROCEDURE PERFORMED: Right total knee replacement    SURGEON: Monroe Vela M.D.    ASSISTANT: REBECA SMITH    IMPLANTS: [employer]hew [employer]:     Implant Name Type Inv. Item Serial No.  Lot No. LRB No. Used Action   CMT BONE PALACOS R HI/VISC 1X40 - [id number] Implant CMT BONE PALACOS R HI/VISC 1X40  [facility] [id number] Right 1 Implanted   SUT CONTRL TISS STRATAFIX SYMM PDS PLUS PEPE CT-1 60CM - [id number] Implant SUT CONTRL TISS STRATAFIX SYMM PDS PLUS PEPE CT-1 60CM  [employer]  DIV OF J AND J [id number] Right 1 Implanted   SUT CONTRL TISS STRATAFIX SPIRAL MNCRYL UD 3/0 PLS 30CM - [id number] Implant SUT CONTRL TISS STRATAFIX SPIRAL MNCRYL UD 3/0 PLS 30CM  [employer]  DIV OF J AND J [id number] Right 1 Implanted   PAT GEN2 BICONVEX 02B49BS - [id number] Implant PAT GEN2 BICONVEX [id number]  [employer] [id number] Right 1 Implanted   COMP FEM LEGION OXINIUM CR SZ4 RT - [id number] Implant COMP FEM LEGION OXINIUM CR SZ4 RT  [employer] [id number] Right 1 Implanted   BASE TIB/KN GEN2 NONPOR TI SZ4 RT - [id number] Implant BASE TIB/KN GEN2 NONPOR TI SZ4 RT  [employer] [id number] Right 1 Implanted   INSRT ART LEGION CR HF XLPE SZ3TO4 9MM - [id number] Implant INSRT ART LEGION CR HF XLPE SZ3TO4 9MM  [employer] [id number] Right 1 Implanted       Estimated Blood Loss: 200cc  Specimens : none  Complications: none    DESCRIPTION OF PROCEDURE: The patient was taken to the operating room and placed in the supine position. A sequential compression device was carefully placed on the non-operative leg. Preoperative antibiotics were administered. Surgical time out was performed. After adequate induction of anesthesia, the leg was prepped and draped in the usual sterile fashion, exsanguinated with an  Esmarch bandage and the tourniquet inflated to 250 mmHg. A midline incision was performed followed by a medial parapatellar arthrotomy. The patella was subluxed laterally.  A portion of the fat pad, ACL, and anterior horns of the meniscus were excised. The drill hole was placed in the distal femur and the canal was the irrigated and suctioned. The IM rico was placed and a 5 degree distal valgus cut was performed on the femur. The femur was then sized with a sizing guide. The femoral cutting block was placed and all femoral cuts were performed. The proximal tibia was exposed. We used the extramedullary tibial cutting guide set for removal of 9mm of bone off the high side. The tibial cut was performed. The posterior horns of the menisci were excised. The posterior osteophytes were removed. Flexion extension blocks were then used to balance the knee. The tibial cut surface was then sized with the sizing templates and the tibial and femoral trial were then placed. The knee was placed in full extension and then the tibial tray rotation was then matched to the femoral rotation and marked.    Attention was then placed to the patella. The patella was noted to track centrally through range of motion. The patella was then sized with the trials. The thickness of the patella was then measured. The patella was resurfaced and the surrounding osteophytes were removed. The preoperative thickness was reproduced. The patella tracked centrally through range of motion.   At this point all trial components were removed, the knee was copiously irrigated with pulsed lavage, and the knee was injected with anesthetic cocktail solution. The cut surfaces were then dried with clean lap sponges, and the components were cemented tibia, followed by femur, then patella. The knee was held in full extension and all excess cement was removed. The knee was held still until the cement had completely hardened. We then placed the trial polyethylene spacer  which resulted in full extension and excellent flexion-extension balance. We placed the final polyethylene spacer.   The knee was then copiously irrigated. The tourniquet was then released. There was excellent hemostasis. We placed a one-eighth inch Hemovac drain. We closed the knee in multiple layers in standard fashion. Sterile dressing were applied. At the end of the case, the sponge and needle counts were reported as being correct. There were no known complications. The patient was then transported to the recovery room.      Monroe Vela M.D.

## 2021-07-17 ENCOUNTER — HOME HEALTH ADMISSION (OUTPATIENT)
Dept: HOME HEALTH SERVICES | Facility: HOME HEALTHCARE | Age: 76
End: 2021-07-17

## 2021-07-17 VITALS
HEART RATE: 64 BPM | WEIGHT: 139.55 LBS | BODY MASS INDEX: 22.43 KG/M2 | DIASTOLIC BLOOD PRESSURE: 75 MMHG | TEMPERATURE: 96.9 F | OXYGEN SATURATION: 100 % | HEIGHT: 66 IN | RESPIRATION RATE: 16 BRPM | SYSTOLIC BLOOD PRESSURE: 112 MMHG

## 2021-07-17 LAB
HCT VFR BLD AUTO: 35.3 % (ref 34–46.6)
HGB BLD-MCNC: 11.8 G/DL (ref 12–15.9)

## 2021-07-17 PROCEDURE — A9270 NON-COVERED ITEM OR SERVICE: HCPCS | Performed by: NURSE PRACTITIONER

## 2021-07-17 PROCEDURE — 63710000001 MELOXICAM 15 MG TABLET: Performed by: NURSE PRACTITIONER

## 2021-07-17 PROCEDURE — 85014 HEMATOCRIT: CPT | Performed by: NURSE PRACTITIONER

## 2021-07-17 PROCEDURE — 85018 HEMOGLOBIN: CPT | Performed by: NURSE PRACTITIONER

## 2021-07-17 PROCEDURE — 63710000001 POLYETHYLENE GLYCOL 17 G PACK: Performed by: NURSE PRACTITIONER

## 2021-07-17 PROCEDURE — 63710000001 PANTOPRAZOLE 40 MG TABLET DELAYED-RELEASE: Performed by: NURSE PRACTITIONER

## 2021-07-17 PROCEDURE — 63710000001 LEVOTHYROXINE 75 MCG TABLET: Performed by: NURSE PRACTITIONER

## 2021-07-17 PROCEDURE — G0378 HOSPITAL OBSERVATION PER HR: HCPCS

## 2021-07-17 PROCEDURE — 97161 PT EVAL LOW COMPLEX 20 MIN: CPT

## 2021-07-17 PROCEDURE — 63710000001 HYDROCODONE-ACETAMINOPHEN 7.5-325 MG TABLET: Performed by: NURSE PRACTITIONER

## 2021-07-17 PROCEDURE — 97110 THERAPEUTIC EXERCISES: CPT

## 2021-07-17 PROCEDURE — 25010000003 CEFAZOLIN IN DEXTROSE 2-4 GM/100ML-% SOLUTION: Performed by: NURSE PRACTITIONER

## 2021-07-17 PROCEDURE — 63710000001 MUPIROCIN 2 % OINTMENT: Performed by: NURSE PRACTITIONER

## 2021-07-17 PROCEDURE — 63710000001 ASPIRIN 81 MG TABLET DELAYED-RELEASE: Performed by: NURSE PRACTITIONER

## 2021-07-17 RX ADMIN — PANTOPRAZOLE SODIUM 40 MG: 40 TABLET, DELAYED RELEASE ORAL at 06:18

## 2021-07-17 RX ADMIN — HYDROCODONE BITARTRATE AND ACETAMINOPHEN 2 TABLET: 7.5; 325 TABLET ORAL at 09:22

## 2021-07-17 RX ADMIN — POLYETHYLENE GLYCOL 3350 17 G: 17 POWDER, FOR SOLUTION ORAL at 09:23

## 2021-07-17 RX ADMIN — MELOXICAM 15 MG: 15 TABLET ORAL at 09:23

## 2021-07-17 RX ADMIN — CEFAZOLIN SODIUM 2 G: 2 INJECTION, SOLUTION INTRAVENOUS at 04:02

## 2021-07-17 RX ADMIN — ASPIRIN 81 MG: 81 TABLET, COATED ORAL at 09:23

## 2021-07-17 RX ADMIN — MUPIROCIN 1 APPLICATION: 20 OINTMENT TOPICAL at 09:23

## 2021-07-17 RX ADMIN — LEVOTHYROXINE SODIUM 75 MCG: 0.07 TABLET ORAL at 06:18

## 2021-07-17 NOTE — THERAPY DISCHARGE NOTE
Patient Name: Jerilyn Martinez  : 1945    MRN: [id number]                              Today's Date: 2021       Admit Date: 2021    Visit Dx:     ICD-10-CM ICD-9-CM   1. S/P TKR (total knee replacement) using cement, right  Z96.651 V43.65   2. Chronic pain of right knee  M25.561 719.46    G89.29 338.29     Patient Active Problem List   Diagnosis   • Gastroesophageal reflux disease   • Iron deficiency anemia   • Long term current use of non-steroidal anti-inflammatories (NSAID)   • COPD (chronic obstructive pulmonary disease) (CMS/HCC)   • Malignant neoplasm of upper lobe of right lung (CMS/HCC)   • Chronic pain of right knee     Past Medical History:   Diagnosis Date   • Arthritis    • Disease of thyroid gland     HYPOTHYROIDISM   • GERD (gastroesophageal reflux disease)    • High cholesterol    • History of bilateral breast implants    • Iron deficiency anemia    • Lumbar stenosis     AND CERVICAL   • Lung cancer (CMS/HCC)     RIGHT UPPER LOBE   • Murmur, heart    • Periodic heart flutter     2O PLUS YEARS AGO   • Right knee pain    • Slow to wake up after anesthesia    • Slow to wake up after anesthesia    • Spinal stenosis    • Wears contact lenses      Past Surgical History:   Procedure Laterality Date   • APPENDECTOMY     • BUNIONECTOMY      X 2   • HYSTERECTOMY     • LASIK     • THORACOSCOPY Right 2017    Procedure: BRONCHOSCOPY, RIGHT VAT, ROBOT ASSISTED RIGHT UPPER LOBECTOMY, INTERCOSTAL NERVE BLOCK WITH CRYOA;  Surgeon: Yuri Brizuela III, MD;  Location: [facility];  Service:    • THYROID SURGERY      nodule removed   • TONSILLECTOMY AND ADENOIDECTOMY       General Information     Row Name 21 1121          Physical Therapy Time and Intention    Document Type  discharge evaluation/summary Pt. is s/p Right TKR  -MS     Mode of Treatment  physical therapy;individual therapy  -MS     Row Name 21 1121          General Information    Patient Profile Reviewed  yes  -MS      Prior Level of Function  independent:  -MS     Barriers to Rehab  none identified  -MS     Row Name 07/17/21 1121          Cognition    Orientation Status (Cognition)  oriented x 3  -MS     Row Name 07/17/21 1121          Safety Issues, Functional Mobility    Comment, Safety Issues/Impairments (Mobility)  Gait belt used for safety.  -MS       User Key  (r) = Recorded By, (t) = Taken By, (c) = Cosigned By    Initials Name Provider Type    Julio Cesar Hammer, PT Physical Therapist        Mobility     Row Name 07/17/21 1122          Bed Mobility    Bed Mobility  supine-sit;sit-supine  -MS     Comment (Bed Mobility)  Up in chair  -MS     Row Name 07/17/21 1122          Sit-Stand Transfer    Sit-Stand [place] (Transfers)  independent  -MS     Assistive Device (Sit-Stand Transfers)  walker, front-wheeled  -MS     Row Name 07/17/21 1122          Gait/Stairs (Locomotion)    [place] Level (Gait)  independent  -MS     Assistive Device (Gait)  walker, front-wheeled  -MS     Distance in Feet (Gait)  200 feet  -MS     Deviations/Abnormal Patterns (Gait)  justa decreased  -MS     [place] Level (Stairs)  stand by assist  -MS     Handrail Location (Stairs)  both sides  -MS     Number of Steps (Stairs)  4  -MS     Ascending Technique (Stairs)  step-to-step  -MS     Descending Technique (Stairs)  step-to-step  -MS     Row Name 07/17/21 1122          Mobility    Extremity Weight-bearing Status  right lower extremity  -MS     Right Lower Extremity (Weight-bearing Status)  weight-bearing as tolerated (WBAT)  -MS       User Key  (r) = Recorded By, (t) = Taken By, (c) = Cosigned By    Initials Name Provider Type    Julio Cesar Hammer, PT Physical Therapist        Obj/Interventions     Row Name 07/17/21 1123          Range of Motion Comprehensive    Comment, General Range of Motion  BUE/LLE (WFL's); Right knee AROM (5, 75)  -MS     Row Name 07/17/21 1123          Strength Comprehensive (MMT)    Comment, General Manual  Muscle Testing (MMT) Assessment  BUE/LLE (3+/5)  -MS     Row Name 07/17/21 1123          Motor Skills    Therapeutic Exercise  -- Right TKR ther. ex. program x 10 reps completed  -MS       User Key  (r) = Recorded By, (t) = Taken By, (c) = Cosigned By    Initials Name Provider Type    Julio Cesar Hammer, PT Physical Therapist        Goals/Plan    No documentation.       Clinical Impression     [facility] Name 07/17/21 1123          Pain    Additional Documentation  Pain Scale: Numbers Pre/Post-Treatment (Group)  -MS     Row Name 07/17/21 1123          Pain Scale: Numbers Pre/Post-Treatment    Pretreatment Pain Rating  4/10  -MS     Posttreatment Pain Rating  4/10  -MS     Pain Location - Side  Right  -MS     Pain Location  knee  -MS     Pain Intervention(s)  Medication (See MAR);Nursing Notified;Elevated;Cold applied;Repositioned  -MS     [facility] Name 07/17/21 1123          Positioning and Restraints    Pre-Treatment Position  sitting in chair/recliner  -MS     Post Treatment Position  chair  -MS     In Chair  notified nsg;reclined;sitting;call light within reach;encouraged to call for assist;with family/caregiver;exit alarm on  -MS       User Key  (r) = Recorded By, (t) = Taken By, (c) = Cosigned By    Initials Name Provider Type    Julio Cesar Hammer, PT Physical Therapist        Outcome Measures     Row Name 07/17/21 1124          How much help from another person do you currently need...    Turning from your back to your side while in flat bed without using bedrails?  4  -MS     Moving from lying on back to sitting on the side of a flat bed without bedrails?  4  -MS     Moving to and from a bed to a chair (including a wheelchair)?  4  -MS     Standing up from a chair using your arms (e.g., wheelchair, bedside chair)?  4  -MS     Climbing 3-5 steps with a railing?  3  -MS     To walk in hospital room?  4  -MS     AM-PAC 6 Clicks Score (PT)  23  -MS     [facility] Name 07/17/21 1124          Functional Assessment    Outcome  Measure Options  AM-PAC 6 Clicks Basic Mobility (PT)  -MS       User Key  (r) = Recorded By, (t) = Taken By, (c) = Cosigned By    Initials Name Provider Type    MS Julio Cesar Castellano PT Physical Therapist        Physical Therapy Education                 Title: PT OT SLP Therapies (Resolved)     Topic: Physical Therapy (Resolved)     Point: Mobility training (Resolved)     Learning Progress Summary           Patient Acceptance, E,D, VU,DU by MS at 7/17/2021 1124                   Point: Home exercise program (Resolved)     Learning Progress Summary           Patient Acceptance, E,D, VU,DU by MS at 7/17/2021 1124                   Point: Body mechanics (Resolved)     Learning Progress Summary           Patient Acceptance, E,D, VU,DU by MS at 7/17/2021 1124                   Point: Precautions (Resolved)     Learning Progress Summary           Patient Acceptance, E,D, VU,DU by MS at 7/17/2021 1124                               User Key     Initials Effective Dates Name Provider Type Discipline    MS 06/16/21 -  Julio Cesar Castellano PT Physical Therapist PT              PT Recommendation and Plan     Plan of Care Reviewed With: patient  Outcome Summary: Pt. is currently independent/SBA with all functional mobility and has met inpt. P.T. goals.  Pt./family with no further questions/concerns regarding functional mobility or home safety.  Plan for discharge home this date.  Will sign off. Nursing notified.     Time Calculation:   PT Charges     Row Name 07/17/21 1125             Time Calculation    Start Time  0925  -MS      Stop Time  0942  -MS      Time Calculation (min)  17 min  -MS      PT Received On  07/17/21  -MS         Time Calculation- PT    Total Timed Code Minutes- PT  16 minute(s)  -MS        User Key  (r) = Recorded By, (t) = Taken By, (c) = Cosigned By    Initials Name Provider Type    MS Julio Cesar Castellano, PT Physical Therapist        Therapy Charges for Today     Code Description Service Date Service  Provider Modifiers Qty    89242295906 HC PT EVAL LOW COMPLEXITY 2 7/17/2021 Julio Cesar Castellano, PT GP 1    44586717530 HC PT THER PROC EA 15 MIN 7/17/2021 Julio Cesar Castellano, PT GP 1          PT G-Codes  Outcome Measure Options: AM-PAC 6 Clicks Basic Mobility (PT)  AM-PAC 6 Clicks Score (PT): 23    PT Discharge Summary  Anticipated Discharge Disposition (PT): home with assist, home with home health  Reason for Discharge: All goals achieved, Discharge from facility  Discharge Destination: Home with assist, Home with home health    Julio Cesar Castellano, PT  7/17/2021

## 2021-07-17 NOTE — PLAN OF CARE
Goal Outcome Evaluation:  Plan of Care Reviewed With: patient           Outcome Summary: Pt. is currently independent/SBA with all functional mobility and has met inpt. P.T. goals.  Pt./family with no further questions/concerns regarding functional mobility or home safety.  Plan for discharge home this date.  Will sign off. Nursing notified.    Patient was wearing a face mask during this therapy encounter. Therapist used appropriate personal protective equipment including eye protection, mask, and gloves.  Mask used was standard procedure mask. Appropriate PPE was worn during the entire therapy session. Hand hygiene was completed before and after therapy session. Patient is not in enhanced droplet precautions.

## 2021-07-17 NOTE — PLAN OF CARE
See below.    Problem: Adult Inpatient Plan of Care  Goal: Plan of Care Review  Outcome: Met  Flowsheets (Taken 7/17/2021 1242)  Progress: improving  Plan of Care Reviewed With:   patient   daughter  Outcome Summary: 76/F POD#1 right TKA.  ALOx4, RA, lungs clear, BS hypoactive but improving, voiding independently.  Up x1 BRP with walker/gait belt.  2+ pedal pulses noted bilaterally, no c/o numbness/tingling in operative extremity, dressing CDI.  Pain well-controlled with PO pain meds only.  PIV removed.  D/C home with HH today.

## 2021-07-17 NOTE — PROGRESS NOTES
[facility] will follow for home PT. Patient agreeable to services. Contact information confirmed. Thank you.

## 2021-07-17 NOTE — PLAN OF CARE
Goal Outcome Evaluation:  Plan of Care Reviewed With: patient        Progress: improving  Outcome Summary: The pt remains stable. A/O. No s/s distress. S/p R TKA on 07/16/21. w/ obie dressing, c/d/i. w/ HV, bloody output. Pain controlled w/ PRN Norco. +UO. Last BM: 07/16/21. WBAT. BRP. Ambulated this evening w/ walker and stand by assist on the hallway. Tolerated well. Pt c/o slight dizziness and knee pain when back to bed after ambulation. Will continue to monitor.

## 2021-07-17 NOTE — CASE MANAGEMENT/SOCIAL WORK
Discharge Planning Assessment  [facility]     Patient Name: Jerilyn Martinez  MRN: [id number]  Today's Date: 7/17/2021    Admit Date: 7/16/2021    Discharge Needs Assessment     Row Name 07/17/21 0949       Living Environment    Lives With  alone    Current Living Arrangements  home/apartment/condo    Primary Care Provided by  self    Provides Primary Care For  no one    Family Caregiver if Needed  child(jennifer), adult    Family Caregiver Names  daughter Heidy    Quality of Family Relationships  helpful;involved;supportive    Able to Return to Prior Arrangements  yes       Resource/Environmental Concerns    Resource/Environmental Concerns  none    Transportation Concerns  car, none       Transition Planning    Patient/Family Anticipates Transition to  home with family;home with help/services    Patient/Family Anticipated Services at Transition  home health care    Transportation Anticipated  family or friend will provide       Discharge Needs Assessment    Readmission Within the Last 30 Days  no previous admission in last 30 days    Current Outpatient/Agency/Support Group  -- none    Equipment Currently Used at Home  none states she has a walker at home that a friend gave to her to use    Concerns to be Addressed  discharge planning;adjustment to diagnosis/illness    Anticipated Changes Related to Illness  inability to care for self    Equipment Needed After Discharge  commode 3-in-1 commode    Outpatient/Agency/Support Group Needs  homecare agency    Discharge Facility/Level of Care Needs  home with home health    Provided Post Acute Provider List?  Yes    Post Acute Provider List  Home Health    Provided Post Acute Provider Quality & Resource List?  Yes    Post Acute Provider Quality and Resource List  Home Health    Delivered To  Patient    Method of Delivery  Telephone    Patient's Choice of Community Agency(s)  [facility]    Current Discharge Risk  dependent with mobility/activities of daily  living;physical impairment;lives alone        Discharge Plan     Row Name 07/17/21 0952       Plan    Plan  Home via private auto with [facility] to follow    Patient/Family in Agreement with Plan  yes    Plan Comments  Introduced self/explained role of CCP. Face sheet data reviewed. Pt lives at home alone. Spoke with dtr Heidy and she plans to stay with pt for some time after her hospitalization until pt is able to get around well in her home. Spoke with pt after she worked with PT. Pt states she has a walker at home but needs a commode. PT at [facility] is able to provide her with a 3-in-1 commode prior to DC. Discussed MD order for HH. Pt states she has no Hx of HH use. Reviewed [facility] area list and pt would like to use [facility]. [facility] basket [facility]. Called referral to [facility] and they will accept and follow. Pt's dtr Heidy states she will drive pt home. CCP to follow...........JW        Continued Care and Services - Admitted Since 7/16/2021     Home Medical Care     Service Provider Request Status Selected Services Address Phone Fax Patient Preferred    [facility]  Pending - Request Sent N/A [address] [facility] [address] [address] [phone] --       Internal Comment last updated by Daisy Thomas, RN 7/17/2021 0913    Referral called to [facility] and they will accept and follow. [facility] aware pt has DC order for today.                         Expected Discharge Date and Time     Expected Discharge Date Expected Discharge Time    Jul 17, 2021         Demographic Summary     Row Name 07/17/21 0949       General Information    Admission Type  observation    Arrived From  home    Referral Source  admission list    Reason for Consult  discharge planning    Preferred Language  English     Used During This Interaction  no       Contact Information    Permission Granted to Share Info With  ;family/designee        Functional Status     Row Name 07/17/21 0949       Functional  Status    Usual Activity Tolerance  good    Current Activity Tolerance  poor    Functional Status Comments  states her daughter Heidy will stay with her for some time after hospital stay       Functional Status, IADL    Medications  independent    Meal Preparation  independent    Housekeeping  independent    Laundry  independent    Shopping  independent        Psychosocial    No documentation.       Abuse/Neglect    No documentation.       Legal    No documentation.       Substance Abuse    No documentation.       Patient Forms    No documentation.           Daisy Thomas RN

## 2021-07-17 NOTE — DISCHARGE SUMMARY
Patient Name: Jerilyn Martinez  Patient YOB: 1945    Date of Admission:  7/16/2021  Date of Discharge:  7/17/2021  Discharge Diagnosis: AR TOTAL KNEE ARTHROPLASTY [55825] (TOTAL KNEE ARTHROPLASTY)  Presenting Problem/History of Present Illness: Chronic pain of right knee [M25.561, G89.29]  Admitting Physician: Dr Monreo Vela  Consults:   Consults     No orders found for last 30 day(s).          DETAILS OF HOSPITAL STAY:  Patient is a 76 y.o. female was admitted to the floor following the above procedure and underwent an uncomplicated hospital stay.  Patient did well with physical therapy and was ambulating well without problems.  On the day of discharge the wound was clean, dry and intact and calf was soft and non tender and Homans sign was negative.  Patient was tolerating   Diet Instructions     Advance Diet as Tolerated      May advance diet as tolerated while in hospital.    Diet:      Diet Texture / Consistency: Regular       without problems.  Patient will be discharged home.    Condition on Discharge:  Stable    Vital Signs  Temp:  [96.7 °F (35.9 °C)-98.3 °F (36.8 °C)] 97.1 °F (36.2 °C)  Heart Rate:  [62-74] 62  Resp:  [14-18] 16  BP: (101-158)/(63-85) 101/63    LABS:      Admission on 07/16/2021   Component Date Value Ref Range Status   • Hemoglobin 07/17/2021 11.8* 12.0 - 15.9 g/dL Final   • Hematocrit 07/17/2021 35.3  34.0 - 46.6 % Final       No results found.    Discharge Medications     Discharge Medications      New Medications      Instructions Start Date   aspirin 81 MG EC tablet   Take 1 tablet by mouth twice daily x 14 days; then take 1 tablet by mouth daily x 28 days      HYDROcodone-acetaminophen 7.5-325 MG per tablet  Commonly known as: NORCO   Take 1 to 2 tablets by mouth every 4 to 6 hours as needed for pain.      ondansetron 4 MG tablet  Commonly known as: Zofran   4 mg, Oral, Every 8 Hours PRN      polyethylene glycol 17 GM/SCOOP powder  Commonly known as: MIRALAX   Mix one  capful in liquid by mouth 2 (Two) Times a Day for 7 days         Changes to Medications      Instructions Start Date   meloxicam 15 MG tablet  Commonly known as: MOBIC  What changed: additional instructions   Take 1 tablet by mouth Daily for 14 days         Continue These Medications      Instructions Start Date   levothyroxine 75 MCG tablet  Commonly known as: SYNTHROID, LEVOTHROID   75 mcg, Oral, Daily      omeprazole 20 MG capsule  Commonly known as: priLOSEC   20 mg, Oral, Daily      simvastatin 20 MG tablet  Commonly known as: ZOCOR   20 mg, Nightly         Stop These Medications    Chlorhexidine Gluconate Cloth 2 % pads     multivitamin capsule     mupirocin 2 % ointment  Commonly known as: BACTROBAN     vitamin B-12 100 MCG tablet  Commonly known as: CYANOCOBALAMIN            Discharge Instructions: Patient is to continue with physical therapy exercises daily and continue working with the physical therapist as ordered. Patient may weight bear as tolerated. Apply ice regularly. Patient may ice for long periods of time as long as ice is not directly on the skin. Patient instructed on frequent calf pumping exercises.  Patient also instructed on incentive spirometer during hospitalization and encouraged to continue to use at home regularly.    Dressing: The dressing is designed to be left in place until you return to the office in 2 weeks.  The suction unit should stop functioning at 7 days and the green light will switch to yellow.  At that point the suction unit and tubing can be disconnected at the port closest to the dressing.  The suction unit and tubing may be discarded.  You may shower immediately upon return home, you will need to turn the pump off by depressing the orange button once and then you may disconnect the pump and tubing at the connection port.  After showering, shake off the excess water and reattach the tubing.  Restart the pump by depressing the orange button one time and you will notice the  green light flashing again.  If the dressing becomes disloged or saturated it should be changed. Please refer to the MIRZA information sheet if you have any questions about the dressing.  If you have a home health nurse or therapist they can be contacted to assist with dressing change or repair. You may also call the MIRZA dressing hotline for questions related to the dressing ([phone]). If there still other problems or questions related to the dressing despite these measures then you can contact Emilee at our office [phone].  If for some reason the MIRZA dressing is removed, after 7 days the wound can be gently cleaned with antibacterial soap then allowed to dry and covered with a dry sterile dressing. The wound should be covered at all times except while showering.  Patient may change dressings daily and prn using sterile 4x4 and paper tape, and should call if any unusual drainage, redness or swelling.*  Follow up appointment in 2 weeks - patient to call the office at [phone] to schedule.  Patient will be discharged on aspirin 81mg BID x weeks, then daily x 4weeks    Discharge Diagnosis:    Patient Active Problem List   Diagnosis   • Gastroesophageal reflux disease   • Iron deficiency anemia   • Long term current use of non-steroidal anti-inflammatories (NSAID)   • COPD (chronic obstructive pulmonary disease) (CMS/HCC)   • Malignant neoplasm of upper lobe of right lung (CMS/HCC)   • Chronic pain of right knee       Follow-up Appointments  Future Appointments   Date Time Provider Department Center   7/29/2021 11:10 AM Monroe Vela MD [facility] L[facility]   11/1/2021 10:00 AM [facility] CT 3 [facility]   11/10/2021  9:30 AM Yuri Brizuela III, MD [facility]          Monroe Vela MD  07/17/21  06:57 EDT

## 2021-07-17 NOTE — DISCHARGE PLACEMENT REQUEST
"Jerilyn Martinez (76 y.o. Female)     Date of Birth Social Security Number Address Home Phone MRN    1945  [address]   [facility] [facility] [address]  [id number]    Caodaism Marital Status          Hoahaoism        Admission Date Admission Type Admitting Provider Attending Provider Department, Room/Bed    7/16/21 Elective Monroe Vela MD Brown, Reid B, MD [facility] [address] [place], P895/1    Discharge Date Discharge Disposition Discharge Destination         Home-Health Care [facility]              Attending Provider: Monroe Vela MD    Allergies: Codeine    Isolation: None   Infection: None   Code Status: Prior    Ht: 167.6 cm (66\")   Wt: 63.3 kg (139 lb 8.8 oz)    Admission Cmt: None   Principal Problem: Chronic pain of right knee [M25.561,G89.29] More...                 Active Insurance as of 7/16/2021     Primary Coverage     Payor Plan Insurance Group Employer/Plan Group    MEDICARE MEDICARE A & B      Payor Plan Address Payor Plan Phone Number Payor Plan Fax Number Effective Dates    [address] [phone]  3/1/2011 - None Entered    [facility] [id number]       Subscriber Name Subscriber Birth Date Member ID       JERILYN MARTINEZ 1945 [id number]           Secondary Coverage     Payor Plan Insurance Group Employer/Plan Group    [facility] SUPP [id number]     Payor Plan Address Payor Plan Phone Number Payor Plan Fax Number Effective Dates    [address]   12/1/2016 - None Entered    [facility] [id number]       Subscriber Name Subscriber Birth Date Member ID       JERILYN MARTINEZ 1945 [id number]                 Emergency Contacts      (Rel.) Home Phone Work Phone Mobile Phone    Heidy Blas (Daughter) -- -- [phone]    Rylie Harrison (Sister) [phone] -- --    Oxana Hoyt (Sister) -- -- [phone]            "

## 2021-07-18 ENCOUNTER — HOME CARE VISIT (OUTPATIENT)
Dept: HOME HEALTH SERVICES | Facility: HOME HEALTHCARE | Age: 76
End: 2021-07-18

## 2021-07-18 VITALS
SYSTOLIC BLOOD PRESSURE: 118 MMHG | TEMPERATURE: 97.1 F | RESPIRATION RATE: 16 BRPM | HEART RATE: 73 BPM | DIASTOLIC BLOOD PRESSURE: 80 MMHG | OXYGEN SATURATION: 99 %

## 2021-07-18 PROCEDURE — G0151 HHCP-SERV OF PT,EA 15 MIN: HCPCS

## 2021-07-18 NOTE — HOME HEALTH
R TKA by Dr Vela on Friday 7/16/2021.     Lives alone in an apt. Daughter currently staying with her (Heidy).  Pt was I, driving, active prior to surgery.  PMHX: lung cancer 4 years ago.     Today she is having nausea and increased pain.    Plan for next visit : review pain/edema management, gait with rolling walker, progress R Knee beyond 20-90 degrees, review and amend HEP.

## 2021-07-19 ENCOUNTER — HOME CARE VISIT (OUTPATIENT)
Dept: HOME HEALTH SERVICES | Facility: HOME HEALTHCARE | Age: 76
End: 2021-07-19

## 2021-07-19 ENCOUNTER — TELEPHONE (OUTPATIENT)
Dept: ORTHOPEDIC SURGERY | Facility: CLINIC | Age: 76
End: 2021-07-19

## 2021-07-19 ENCOUNTER — HOSPITAL ENCOUNTER (OUTPATIENT)
Dept: CARDIOLOGY | Facility: HOSPITAL | Age: 76
Discharge: HOME | End: 2021-07-19
Admitting: NURSE PRACTITIONER

## 2021-07-19 VITALS
HEART RATE: 60 BPM | OXYGEN SATURATION: 98 % | SYSTOLIC BLOOD PRESSURE: 104 MMHG | DIASTOLIC BLOOD PRESSURE: 80 MMHG | TEMPERATURE: 97 F

## 2021-07-19 DIAGNOSIS — Z96.651 S/P TKR (TOTAL KNEE REPLACEMENT) USING CEMENT, RIGHT: ICD-10-CM

## 2021-07-19 DIAGNOSIS — I82.461 ACUTE DEEP VEIN THROMBOSIS (DVT) OF CALF MUSCLE VEIN OF RIGHT LOWER EXTREMITY (HCC): Primary | ICD-10-CM

## 2021-07-19 DIAGNOSIS — M79.661 RIGHT CALF PAIN: ICD-10-CM

## 2021-07-19 DIAGNOSIS — Z96.651 S/P TKR (TOTAL KNEE REPLACEMENT) USING CEMENT, RIGHT: Primary | ICD-10-CM

## 2021-07-19 LAB
BH CV LOW VAS RIGHT GASTRONEMIUS VESSEL: 1
BH CV LOW VAS RIGHT SOLEAL VESSEL: 1
BH CV LOWER VASCULAR LEFT COMMON FEMORAL AUGMENT: NORMAL
BH CV LOWER VASCULAR LEFT COMMON FEMORAL COMPETENT: NORMAL
BH CV LOWER VASCULAR LEFT COMMON FEMORAL COMPRESS: NORMAL
BH CV LOWER VASCULAR LEFT COMMON FEMORAL PHASIC: NORMAL
BH CV LOWER VASCULAR LEFT COMMON FEMORAL SPONT: NORMAL
BH CV LOWER VASCULAR RIGHT COMMON FEMORAL AUGMENT: NORMAL
BH CV LOWER VASCULAR RIGHT COMMON FEMORAL COMPETENT: NORMAL
BH CV LOWER VASCULAR RIGHT COMMON FEMORAL COMPRESS: NORMAL
BH CV LOWER VASCULAR RIGHT COMMON FEMORAL PHASIC: NORMAL
BH CV LOWER VASCULAR RIGHT COMMON FEMORAL SPONT: NORMAL
BH CV LOWER VASCULAR RIGHT DISTAL FEMORAL COMPRESS: NORMAL
BH CV LOWER VASCULAR RIGHT GASTRONEMIUS COMPRESS: NORMAL
BH CV LOWER VASCULAR RIGHT GASTRONEMIUS THROMBUS: NORMAL
BH CV LOWER VASCULAR RIGHT GREATER SAPH AK COMPRESS: NORMAL
BH CV LOWER VASCULAR RIGHT GREATER SAPH BK COMPRESS: NORMAL
BH CV LOWER VASCULAR RIGHT LESSER SAPH COMPRESS: NORMAL
BH CV LOWER VASCULAR RIGHT MID FEMORAL AUGMENT: NORMAL
BH CV LOWER VASCULAR RIGHT MID FEMORAL COMPETENT: NORMAL
BH CV LOWER VASCULAR RIGHT MID FEMORAL COMPRESS: NORMAL
BH CV LOWER VASCULAR RIGHT MID FEMORAL PHASIC: NORMAL
BH CV LOWER VASCULAR RIGHT MID FEMORAL SPONT: NORMAL
BH CV LOWER VASCULAR RIGHT PERONEAL COMPRESS: NORMAL
BH CV LOWER VASCULAR RIGHT POPLITEAL AUGMENT: NORMAL
BH CV LOWER VASCULAR RIGHT POPLITEAL COMPETENT: NORMAL
BH CV LOWER VASCULAR RIGHT POPLITEAL COMPRESS: NORMAL
BH CV LOWER VASCULAR RIGHT POPLITEAL PHASIC: NORMAL
BH CV LOWER VASCULAR RIGHT POPLITEAL SPONT: NORMAL
BH CV LOWER VASCULAR RIGHT POSTERIOR TIBIAL COMPRESS: NORMAL
BH CV LOWER VASCULAR RIGHT PROFUNDA FEMORAL COMPRESS: NORMAL
BH CV LOWER VASCULAR RIGHT PROXIMAL FEMORAL COMPRESS: NORMAL
BH CV LOWER VASCULAR RIGHT SAPHENOFEMORAL JUNCTION COMPRESS: NORMAL
BH CV LOWER VASCULAR RIGHT SOLEAL COMPRESS: NORMAL
BH CV LOWER VASCULAR RIGHT SOLEAL THROMBUS: NORMAL
MAXIMAL PREDICTED HEART RATE: 145 BPM
STRESS TARGET HR: 123 BPM

## 2021-07-19 PROCEDURE — G0151 HHCP-SERV OF PT,EA 15 MIN: HCPCS

## 2021-07-19 PROCEDURE — 93971 EXTREMITY STUDY: CPT

## 2021-07-19 NOTE — TELEPHONE ENCOUNTER
Spoke with PT- pt complaining of right calf pain. The calf is slightly swollen and tender to palpation. Stat doppler ordered to r/o DVT.

## 2021-07-19 NOTE — TELEPHONE ENCOUNTER
Hub staff attempted to follow warm transfer process and was unsuccessful     Caller: JOSE ARMANDO HOLMAN    Relationship to patient: [facility] - PHYSICAL THERAPY    Best call back number: [phone]    Patient is needing: PATIENT IS HAVING POST OP PAIN IN CALF FOLLOWING THE TOTAL KNEE THAT SHE HAD LAST WEEK. IF YOU CAN'T REACH THE CAREGIVER, PLEASE CONTACT THE PATIENT.

## 2021-07-19 NOTE — TELEPHONE ENCOUNTER
Spoke with pt and she was informed of the doppler to done today at 4:45 at the hospital and she voiced understanding.

## 2021-07-19 NOTE — TELEPHONE ENCOUNTER
I spoke to Ms. Martinez.  I explained that her doppler is positive for a blood clot.  She denies history of same.  I have given her a prescription for Xarelto.  The risks of this medication were discussed.  Patient instructed to discontinue aspirin and avoid anti-inflammatories while taking this medication.  She verbalized understanding and appreciated the assistance.

## 2021-07-19 NOTE — PROGRESS NOTES
Vascular lab preliminary    Right lower extremity venous duplex exam is complete    Right leg positive for acute calf DVT      Spoke with Anita Boss from [facility] at 5:33pm, she gave patient instruction over the phone and placing order for blood thinners    Final report to follow

## 2021-07-19 NOTE — CASE MANAGEMENT/SOCIAL WORK
Case Management Discharge Note      Final Note: Home with [facility].    Provided Post Acute Provider List?: Yes  Post Acute Provider List: Home Health  Provided Post Acute Provider Quality & Resource List?: Yes  Post Acute Provider Quality and Resource List: Home Health  Delivered To: Patient  Method of Delivery: Telephone    Selected Continued Care - Discharged on 7/17/2021 Admission date: 7/16/2021 - Discharge disposition: Home-Health Care Svc    Destination    No services have been selected for the patient.              Durable Medical Equipment    No services have been selected for the patient.              Dialysis/Infusion    No services have been selected for the patient.              Home Medical Care Coordination complete    Service Provider Selected Services Address Phone Fax Patient Preferred    [facility]  Home Health Services [address] [address] [phone] --       Internal Comment last updated by Daisy Thomas, RN 7/17/2021 7086    Referral called to [facility] and they will accept and follow. [facility] aware pt has DC order for today.                     Therapy    No services have been selected for the patient.              Community Resources    No services have been selected for the patient.              Community & DME    No services have been selected for the patient.                       Final Discharge Disposition Code: 06 - home with home health care

## 2021-07-20 ENCOUNTER — TELEPHONE (OUTPATIENT)
Dept: ORTHOPEDIC SURGERY | Facility: CLINIC | Age: 76
End: 2021-07-20

## 2021-07-20 ENCOUNTER — HOME CARE VISIT (OUTPATIENT)
Dept: HOME HEALTH SERVICES | Facility: HOME HEALTHCARE | Age: 76
End: 2021-07-20

## 2021-07-20 NOTE — TELEPHONE ENCOUNTER
Patient's says that [facility]  Doesn't have the Xarelto in stock and wants to know if the prescription could be sent to [facility] pharmacy in [place]. The number is [phone].

## 2021-07-21 ENCOUNTER — HOME CARE VISIT (OUTPATIENT)
Dept: HOME HEALTH SERVICES | Facility: HOME HEALTHCARE | Age: 76
End: 2021-07-21

## 2021-07-21 VITALS
HEART RATE: 68 BPM | TEMPERATURE: 97.5 F | DIASTOLIC BLOOD PRESSURE: 68 MMHG | OXYGEN SATURATION: 95 % | SYSTOLIC BLOOD PRESSURE: 106 MMHG

## 2021-07-21 PROCEDURE — G0151 HHCP-SERV OF PT,EA 15 MIN: HCPCS

## 2021-07-21 NOTE — TELEPHONE ENCOUNTER
I spoke to Ms. Martinez.  She was able to get the Xarelto this morning and has taken the first dose.  We discussed discontinuing aspirin and meloxicam while taking Xarelto.  Going forward, she will follow up with Dr. Vela or ADELITA Diaz.

## 2021-07-22 ENCOUNTER — HOME CARE VISIT (OUTPATIENT)
Dept: HOME HEALTH SERVICES | Facility: HOME HEALTHCARE | Age: 76
End: 2021-07-22

## 2021-07-22 VITALS
OXYGEN SATURATION: 96 % | TEMPERATURE: 97.6 F | SYSTOLIC BLOOD PRESSURE: 130 MMHG | HEART RATE: 68 BPM | DIASTOLIC BLOOD PRESSURE: 70 MMHG

## 2021-07-22 PROCEDURE — G0151 HHCP-SERV OF PT,EA 15 MIN: HCPCS

## 2021-07-22 NOTE — HOME HEALTH
Patient reports she did not do any more exercises yesterday but did walk. She is scheduled for out patient for 8/2/21

## 2021-07-26 ENCOUNTER — HOME CARE VISIT (OUTPATIENT)
Dept: HOME HEALTH SERVICES | Facility: HOME HEALTHCARE | Age: 76
End: 2021-07-26

## 2021-07-26 VITALS
SYSTOLIC BLOOD PRESSURE: 112 MMHG | HEART RATE: 75 BPM | OXYGEN SATURATION: 99 % | TEMPERATURE: 97.6 F | DIASTOLIC BLOOD PRESSURE: 68 MMHG

## 2021-07-26 PROCEDURE — G0151 HHCP-SERV OF PT,EA 15 MIN: HCPCS

## 2021-07-28 RX ORDER — MELOXICAM 15 MG/1
TABLET ORAL
Qty: 30 TABLET | Refills: 3 | OUTPATIENT
Start: 2021-07-28

## 2021-07-29 ENCOUNTER — OFFICE VISIT (OUTPATIENT)
Dept: ORTHOPEDIC SURGERY | Facility: CLINIC | Age: 76
End: 2021-07-29

## 2021-07-29 ENCOUNTER — HOME CARE VISIT (OUTPATIENT)
Dept: HOME HEALTH SERVICES | Facility: HOME HEALTHCARE | Age: 76
End: 2021-07-29

## 2021-07-29 VITALS
HEART RATE: 68 BPM | DIASTOLIC BLOOD PRESSURE: 64 MMHG | OXYGEN SATURATION: 97 % | TEMPERATURE: 97.3 F | SYSTOLIC BLOOD PRESSURE: 160 MMHG

## 2021-07-29 DIAGNOSIS — I82.461 ACUTE DEEP VEIN THROMBOSIS (DVT) OF CALF MUSCLE VEIN OF RIGHT LOWER EXTREMITY (HCC): ICD-10-CM

## 2021-07-29 DIAGNOSIS — I82.4Y1 ACUTE DEEP VEIN THROMBOSIS (DVT) OF PROXIMAL VEIN OF RIGHT LOWER EXTREMITY (HCC): ICD-10-CM

## 2021-07-29 DIAGNOSIS — Z96.651 STATUS POST RIGHT KNEE REPLACEMENT: Primary | ICD-10-CM

## 2021-07-29 DIAGNOSIS — Z96.651 S/P TKR (TOTAL KNEE REPLACEMENT) USING CEMENT, RIGHT: ICD-10-CM

## 2021-07-29 PROCEDURE — 99024 POSTOP FOLLOW-UP VISIT: CPT | Performed by: ORTHOPAEDIC SURGERY

## 2021-07-29 PROCEDURE — G0180 MD CERTIFICATION HHA PATIENT: HCPCS | Performed by: ORTHOPAEDIC SURGERY

## 2021-07-29 PROCEDURE — G0151 HHCP-SERV OF PT,EA 15 MIN: HCPCS

## 2021-07-29 RX ORDER — HYDROCODONE BITARTRATE AND ACETAMINOPHEN 7.5; 325 MG/1; MG/1
TABLET ORAL
Qty: 20 TABLET | Refills: 0 | Status: SHIPPED | OUTPATIENT
Start: 2021-07-29 | End: 2021-08-20 | Stop reason: SDUPTHER

## 2021-07-29 NOTE — HOME HEALTH
Patient reports she saw the doctor today and he was pleased with progress. Patient is going to continue on blood thinner and will have a doppler in 6 weeks.

## 2021-07-29 NOTE — CASE COMMUNICATION
Patient is discharged from physical therapy with all goals met, she will begin out patient physical therapy on 8/2/21

## 2021-07-29 NOTE — PROGRESS NOTES
Jerilyn Martinez : 1945 MRN: [id number] DATE: 2021    DIAGNOSIS: 2 week follow up right total knee      SUBJECTIVE:Patient returns today for 2 week follow up of right total knee replacement. Patient reports doing well with no unusual complaints. Appears to be progressing appropriately. On Xarelto for DVT.    OBJECTIVE:   Exam:. The incision is healing appropriately. No sign of infection. Range of motion is progressing as expected. The calf is soft and nontender with a negative Homans sign.    ASSESSMENT: 2 week status post right knee replacement.    PLAN: 1) Staples removed and steri strips applied   2) Order given for PT   3) Discontinue NATE hose   4) Continue ice PRN   5) Xarelto for 5 more weeks -  Duplex u/s scheduled for 1 ,month from now   6) Follow up in 6 weeks with repeat Xrays of right knee (3views)    Monroe Vela MD  2021

## 2021-08-02 ENCOUNTER — TREATMENT (OUTPATIENT)
Dept: PHYSICAL THERAPY | Facility: CLINIC | Age: 76
End: 2021-08-02

## 2021-08-02 DIAGNOSIS — M25.561 ACUTE PAIN OF RIGHT KNEE: ICD-10-CM

## 2021-08-02 DIAGNOSIS — Z74.09 IMPAIRED FUNCTIONAL MOBILITY, BALANCE, GAIT, AND ENDURANCE: ICD-10-CM

## 2021-08-02 DIAGNOSIS — Z96.651 S/P TOTAL KNEE ARTHROPLASTY, RIGHT: Primary | ICD-10-CM

## 2021-08-02 PROCEDURE — 97530 THERAPEUTIC ACTIVITIES: CPT | Performed by: PHYSICAL THERAPIST

## 2021-08-02 PROCEDURE — 97140 MANUAL THERAPY 1/> REGIONS: CPT | Performed by: PHYSICAL THERAPIST

## 2021-08-02 PROCEDURE — 97162 PT EVAL MOD COMPLEX 30 MIN: CPT | Performed by: PHYSICAL THERAPIST

## 2021-08-02 PROCEDURE — 97110 THERAPEUTIC EXERCISES: CPT | Performed by: PHYSICAL THERAPIST

## 2021-08-02 NOTE — PROGRESS NOTES
Physical Therapy Initial Evaluation and Plan of Care    Patient: Jerilyn Martinez   : 1945  Diagnosis/ICD-10 Code:  S/P total knee arthroplasty, right [Z96.651]  Referring practitioner: ADELITA Aponte    Subjective Evaluation    History of Present Illness  Date of surgery: 2021  Mechanism of injury: Chronic right knee pain for past two years - had torn meniscus but progressed with therapy -   Pain progressed and underwent Right TKR 21  One night in the hospital - Home Health PT for 5 visits  Developed blood clots in right posterior calf - had doppler US last week, multiple clots in calf - now on blood thinner - xeralto   Saw Dr Vela 21 - using cane outside  Walks, housework, lives in condo, [place] plants  Takes Tylenol, and hydrocodone tid    Pain  Current pain ratin  At best pain ratin  At worst pain ratin  Location: Anteiror right knee, distal thigh, occasional popping, no giving out, no NT  Quality: dull ache, tight, throbbing, pressure and discomfort  Relieving factors: support, rest, relaxation, change in position, ice and medications (propping up)  Aggravating factors: stairs and squatting (bending it, prolonged activity)  Progression: improved    Social Support  Lives in: condominium    Diagnostic Tests  X-ray: abnormal    Treatments  Previous treatment: medication  Current treatment: medication and physical therapy  Discharged from (in last 30 days): home health care  Patient Goals  Patient goal: get back to normal activities without pain, be self sufficient, walking 10K steps/day            Objective          Observations     Additional Knee Observation Details  Swelling right knee, has adhesive dressing over the incision site    Palpation     Right   No palpable tenderness to the distal biceps femoris. Tenderness of the distal semimembranosus, distal semitendinosus, lateral gastrocnemius and medial gastrocnemius.     Tenderness     Right Knee   Tenderness in the  lateral joint line and medial joint line.     Neurological Testing     Sensation     Knee     Right Knee   Diminished: light touch     Comments   Right light touch: decreased light touch lateral knee.     Active Range of Motion     Right Knee   Extension: 3 degrees with pain  Extensor lag: 10 degrees with pain    Passive Range of Motion     Right Knee   Flexion: 108 degrees   Extension: 2 degrees     Additional Passive Range of Motion Details  Able to achieve 117* flexion after exercise today    Tests     Right Knee   Negative valgus stress test at 0 degrees and varus stress test at 0 degrees.     Swelling     Right Knee Girth Measurement (cm)   Joint line: 40 cm          Assessment & Plan     Assessment  Impairments: abnormal gait, abnormal or restricted ROM, activity intolerance, impaired physical strength, lacks appropriate home exercise program, pain with function and weight-bearing intolerance  Assessment details: 76 y.o. female seen 2 weeks post Right TKR presents with: 1. Right knee pain, 2. Decreased knee AROM, 3. Antalgic gait pattern, 4. Decreased knee strength, 5. Swelling right knee, 6. Decreased tolerance for many normal ADL's, LEFS Score of 33/80  Prognosis: good  Functional Limitations: walking, standing and stooping  Goals  Plan Goals: Short Term Goals: 3 weeks  Patient will be able to tolerate initial exercises  Patient will have pain <5/10  Patient will be able to sleep without pain interruption  Patient will be walk up steps without increased symptoms    Long Term Goals: 6 weeks  Patient will be independent in performing home exercise program.  Patient will have functional pain free knee AROM  Patient will be able to walk on level surfaces and up/down inclines without increased pain  Patient will return to walking 8-10K step per day without increased symptoms  Patient will be able to do light housework without increased symptoms      Plan  Therapy options: will be seen for skilled physical therapy  services  Planned modality interventions: cryotherapy, electrical stimulation/Russian stimulation and TENS  Planned therapy interventions: manual therapy, strengthening, stretching, joint mobilization, home exercise program, gait training and soft tissue mobilization  Frequency: 3x week  Duration in visits: 18  Duration in weeks: 6  Treatment plan discussed with: patient  Plan details: Patient instructed to cont the HEP from home health at this time.  Will progress next visit          Manual Therapy:    15     mins  37176;  Therapeutic Exercise:    10     mins  20895;     Neuromuscular Veena:    0    mins  22173;    Therapeutic Activity:     10     mins  64801;       Evaluation Time:     20  mins  Timed Treatment:   35   mins   Total Treatment:     90   mins    PT SIGNATURE: Eliza Desouza, PT   DATE TREATMENT INITIATED: 8/2/2021    Initial Certification  Certification Period: 10/31/2021  I certify that the therapy services are furnished while this patient is under my care.  The services outlined above are required by this patient, and will be reviewed every 90 days.     PHYSICIAN: Casa Turpin APRN      DATE:     Please sign and return via fax to [phone].. Thank you, [facility].

## 2021-08-03 NOTE — TELEPHONE ENCOUNTER
Please call pharmacy, patient should be on Xarelto 20 mg 1 p.o. daily x4 weeks.  Also let the patient know that we are changing her to 20 mg daily for 4 weeks.

## 2021-08-04 ENCOUNTER — TREATMENT (OUTPATIENT)
Dept: PHYSICAL THERAPY | Facility: CLINIC | Age: 76
End: 2021-08-04

## 2021-08-04 DIAGNOSIS — Z74.09 IMPAIRED FUNCTIONAL MOBILITY, BALANCE, GAIT, AND ENDURANCE: ICD-10-CM

## 2021-08-04 DIAGNOSIS — Z96.651 S/P TOTAL KNEE ARTHROPLASTY, RIGHT: Primary | ICD-10-CM

## 2021-08-04 PROCEDURE — 97140 MANUAL THERAPY 1/> REGIONS: CPT | Performed by: PHYSICAL THERAPIST

## 2021-08-04 PROCEDURE — G0283 ELEC STIM OTHER THAN WOUND: HCPCS | Performed by: PHYSICAL THERAPIST

## 2021-08-04 PROCEDURE — 97110 THERAPEUTIC EXERCISES: CPT | Performed by: PHYSICAL THERAPIST

## 2021-08-04 PROCEDURE — 97112 NEUROMUSCULAR REEDUCATION: CPT | Performed by: PHYSICAL THERAPIST

## 2021-08-04 NOTE — PROGRESS NOTES
Physical Therapy Daily Progress Note        VISIT#: 2      Jerilyn Martinez reports: She is really sore from last session. She has been doing PT at home but for whatever reason, probably more intense stuff, she is more sore  Current Pain Level:    5/10; Worst:   5/10; Best:  0/10  Location Of Pain: R knee  Response to Previous Session: Soreness  Functional Deficits/Irritating Factors: stairs and squatting (bending it, prolonged activity)  Progression: worsening today   Compliance with HEP Reported: yes    Objective   Presents: ambulating w/o AD. Slight antalgia  Increased sets/reps of:  none   Increased resistance on:  none  Added to Program: Rocker board, Leg Press and Leg curl    AROM R knee extension = 0 deg; Flexion = 105 deg      See Exercise, Manual, and Modality Logs for complete treatment.     Patient Education: Pt was educated on exercise biomechanical correctness, intensity, and speed.     Assessment: Pt feeling very sore today so tried to reduce the intensity of a few exercises and avoided the bike as pt felt that may have been the aggravating factor. Added some isometric strengthening so less joint movement would be involved and aggravated. Pt left session reporting her knee felt improved/less pain and tightness.   Pt will continue to benefit from skilled PT interventions to address current functional deficits and impairments.       Plan: Progress to/Continue with current program.        Manual Therapy:    10     mins  29377;  Ultrasound:   0 mins 95014  Electrical Stimulation: __15____ mins 55045/  Iontophoresis: 0 mins 85776  Traction: 0 mins  04615  Work Conditionin mins 03525  Therapeutic Exercise:    15     mins  33177;     Neuromuscular Veena:    20    mins  17639;    Therapeutic Activity:     0     mins  19313;     Timed Treatment:   45   mins   Total Treatment:     60   mins    V[employer] Magaña License # [id number]  Physical Therapist Assistant

## 2021-08-06 ENCOUNTER — TREATMENT (OUTPATIENT)
Dept: PHYSICAL THERAPY | Facility: CLINIC | Age: 76
End: 2021-08-06

## 2021-08-06 ENCOUNTER — HOME CARE VISIT (OUTPATIENT)
Dept: HOME HEALTH SERVICES | Facility: HOME HEALTHCARE | Age: 76
End: 2021-08-06

## 2021-08-06 DIAGNOSIS — Z96.651 S/P TOTAL KNEE ARTHROPLASTY, RIGHT: Primary | ICD-10-CM

## 2021-08-06 DIAGNOSIS — Z74.09 IMPAIRED FUNCTIONAL MOBILITY, BALANCE, GAIT, AND ENDURANCE: ICD-10-CM

## 2021-08-06 PROCEDURE — 97140 MANUAL THERAPY 1/> REGIONS: CPT | Performed by: PHYSICAL THERAPIST

## 2021-08-06 PROCEDURE — 97110 THERAPEUTIC EXERCISES: CPT | Performed by: PHYSICAL THERAPIST

## 2021-08-06 PROCEDURE — G0283 ELEC STIM OTHER THAN WOUND: HCPCS | Performed by: PHYSICAL THERAPIST

## 2021-08-06 PROCEDURE — 97112 NEUROMUSCULAR REEDUCATION: CPT | Performed by: PHYSICAL THERAPIST

## 2021-08-06 NOTE — PROGRESS NOTES
Physical Therapy Daily Progress Note        VISIT#: 3      Jerilyn Martinez reports: Her knee is doing better. Her pain is decreasing and she is able to do more around the house. She did laundry and vacuuming. She is tightest in the morning but it improves as the day progresses. She walks her dog several times a day.   Current Pain Level:    5/10; Worst:   5-6/10; Best:  0/10  Location Of Pain: R knee  Response to Previous Session: No issues  Functional Deficits/Irritating Factors: stairs and squatting (bending it, prolonged activity)  Progression: Improving  Compliance with HEP Reported: Yes    Objective   Presents:   Increased sets/reps of:  none   Increased resistance on:  none  Added to Program: Step ups, Straddle Steps    AROM R knee flexion = 109 deg; Extension = 0 deg      See Exercise, Manual, and Modality Logs for complete treatment.     Patient Education: Pt was educated on exercise biomechanical correctness, intensity, and speed.     Assessment:  Pt is doing well. Her knee pain has decreased and her function has improved. Edema has also decreased. She had some gains in both flexion and extension ROM today.  Pt will continue to benefit from skilled PT interventions to address current functional deficits and impairments.       Plan: Progress to/Continue with current program. 4 way hip, straddle steps        Manual Therapy:    10     mins  62432;  Ultrasound:   0 mins 29109  Electrical Stimulation: __15____ mins 67142/  Iontophoresis: 0 mins 90007  Traction: 0 mins  61860  Work Conditionin mins 34450  Therapeutic Exercise:    35     mins  35834;     Neuromuscular Veena:    0    mins  76659;    Therapeutic Activity:    0      mins  92876;     Timed Treatment:   60   mins   Total Treatment:     90   mins    VITALY Magaña License # [id number]  Physical Therapist Assistant

## 2021-08-09 ENCOUNTER — TREATMENT (OUTPATIENT)
Dept: PHYSICAL THERAPY | Facility: CLINIC | Age: 76
End: 2021-08-09

## 2021-08-09 DIAGNOSIS — Z74.09 IMPAIRED FUNCTIONAL MOBILITY, BALANCE, GAIT, AND ENDURANCE: ICD-10-CM

## 2021-08-09 DIAGNOSIS — Z96.651 S/P TOTAL KNEE ARTHROPLASTY, RIGHT: Primary | ICD-10-CM

## 2021-08-09 PROCEDURE — 97140 MANUAL THERAPY 1/> REGIONS: CPT | Performed by: PHYSICAL THERAPIST

## 2021-08-09 PROCEDURE — 97110 THERAPEUTIC EXERCISES: CPT | Performed by: PHYSICAL THERAPIST

## 2021-08-09 NOTE — PROGRESS NOTES
Physical Therapy Daily Progress Note        VISIT#: 4      Jerilyn Martinez reports: She did not sleep well Saturday evening and she had a pretty bad day on . She was hurting a lot. She is better today - she thinks she was just tired.   Current Pain Level:    5-6/10; Worst:   7/10; Best:  0/10  Location Of Pain: R knee  Response to Previous Session: Good. No issues  Functional Deficits/Irritating Factors: stairs and squatting (bending it, prolonged activity)  Progression: Improving  Compliance with HEP Reported: Yes    Objective   Presents: ambulating with antalgia  Increased sets/reps of:  none   Increased resistance on:  none  Added to Program: 4 way hip    AROM R knee flexion = 110 Deg; Extension = lacking 3 deg      See Exercise, Manual, and Modality Logs for complete treatment.     Patient Education: Pt was educated on exercise biomechanical correctness, intensity, and speed. Educated to prop up her leg into extension a couple times a day and working up to 10 min.     Assessment:  Pt made slight gains in flexion ROM but lost a few degrees in extension. Educated pt on importance of continued stretching into extension for awhile, even if she has been a 0 deg. In order to maintain that range. She performed well with all of her exercise interventions with some challenge with balance activities.  Pt will continue to benefit from skilled PT interventions to address current functional deficits and impairments.       Plan: Progress to/Continue with current program. Try mini squats        Manual Therapy:    15     mins  29365;  Ultrasound:   0 mins 71724  Electrical Stimulation: __0/15____ mins 72986/  Iontophoresis: 0 mins 83739  Traction: 0 mins  97381  Work Conditionin mins 17234  Therapeutic Exercise:    45/60    mins  76747;     Neuromuscular Veena:  0      mins  76198;    Therapeutic Activity:     0     mins  74160;     Timed Treatment:   60   mins   Total Treatment:     90   mins    Sydney London,  [employer]  [place] License # [id number]  Physical Therapist Assistant

## 2021-08-11 ENCOUNTER — TREATMENT (OUTPATIENT)
Dept: PHYSICAL THERAPY | Facility: CLINIC | Age: 76
End: 2021-08-11

## 2021-08-11 DIAGNOSIS — Z74.09 IMPAIRED FUNCTIONAL MOBILITY, BALANCE, GAIT, AND ENDURANCE: ICD-10-CM

## 2021-08-11 DIAGNOSIS — Z96.651 S/P TOTAL KNEE ARTHROPLASTY, RIGHT: Primary | ICD-10-CM

## 2021-08-11 PROCEDURE — 97530 THERAPEUTIC ACTIVITIES: CPT | Performed by: PHYSICAL THERAPIST

## 2021-08-11 PROCEDURE — G0283 ELEC STIM OTHER THAN WOUND: HCPCS | Performed by: PHYSICAL THERAPIST

## 2021-08-11 PROCEDURE — 97110 THERAPEUTIC EXERCISES: CPT | Performed by: PHYSICAL THERAPIST

## 2021-08-11 PROCEDURE — 97140 MANUAL THERAPY 1/> REGIONS: CPT | Performed by: PHYSICAL THERAPIST

## 2021-08-11 NOTE — PROGRESS NOTES
Physical Therapy Daily Progress Note    Patient: Jerilyn Martinez   : 1945  Referring practitioner: ADELITA Aponte  Today's Date: 2021  Patient seen for 5 sessions    Visit Diagnoses:    ICD-10-CM ICD-9-CM   1. S/P total knee arthroplasty, right  Z96.651 V43.65   2. Impaired functional mobility, balance, gait, and endurance  Z74.09 V49.89       Subjective   Jerilyn Martinez reports: that she is improving at a steady pace. She states that her pain varies from 2-5/10.  Does have an occasional jab in the knee.  States that she is not using a walker or cane at home but uses a cane when out. States that she was walking about 20 minutes today but was walking very slowly.   Notes that martinez only takes pain meds during the day on days that she comes to therapy.  Otherwise takes 1/2 a pill at night to sleep.       Objective   Presents with residual tape/glue over incision site    Heel slide 2 - 114* flexion    Passive knee flexion 117*    See Exercise, Manual, and Modality Logs for complete treatment.     Patient Education:    Assessment/Plan  Jerilyn is doing extremely well nearly 4 weeks post TKR.  Her pain is minimal unless end ranges of motion.  Very compliant with her HEP.  Does have a bit more swelling today today.  Needs to do more icing at home.    Progress strengthening /stabilization /functional activity           Timed:  Manual Therapy:    15     mins  85982;  Therapeutic Exercise:    15/30     mins  42817;     Neuromuscular Veena:    0/10    mins  42558;    Therapeutic Activity:     10     mins  55855;     Ultrasound:              mins  71673;    Iontophoresis              ____    mins  Dry Needling               ____    mins        Untimed:  Electrical Stimulation:     15        mins  70316 ( );  Mechanical Traction:             mins  49113;     Timed Treatment:   40   mins   Total Treatment:     80   mins    Eliza Desouza, PT  [place] License # [id number]  Physical Therapist

## 2021-08-13 ENCOUNTER — TREATMENT (OUTPATIENT)
Dept: PHYSICAL THERAPY | Facility: CLINIC | Age: 76
End: 2021-08-13

## 2021-08-13 DIAGNOSIS — Z96.651 S/P TOTAL KNEE ARTHROPLASTY, RIGHT: Primary | ICD-10-CM

## 2021-08-13 DIAGNOSIS — Z74.09 IMPAIRED FUNCTIONAL MOBILITY, BALANCE, GAIT, AND ENDURANCE: ICD-10-CM

## 2021-08-13 PROCEDURE — 97140 MANUAL THERAPY 1/> REGIONS: CPT | Performed by: PHYSICAL THERAPIST

## 2021-08-13 PROCEDURE — 97110 THERAPEUTIC EXERCISES: CPT | Performed by: PHYSICAL THERAPIST

## 2021-08-13 NOTE — PROGRESS NOTES
Physical Therapy Daily Progress Note        VISIT#: 6      Jerilyn Martinez reports: She is afraid she hurt her knee as she had to get down on her L knee to get her cat under the bed. In the process she katt twisted her R knee and it hurts a little more than it did. Overall, everything is so much better pain wise and mobility. She is driving now and things at home are getting better.   Current Pain Level:    2/10; Worst:   5-6/10; Best:  0/10  Location Of Pain: R knee  Response to Previous Session: Good. No issues  Functional Deficits/Irritating Factors: ambulation, squatting, stairs  Progression: improving  Compliance with HEP Reported: Yes    Objective   Presents: ambulating with antalgia  Increased sets/reps of:  none   Increased resistance on:  none  Added to Program: none    AROM R knee flexion = 115 deg; Extension = lacking 4 deg      See Exercise, Manual, and Modality Logs for complete treatment.     Patient Education: Pt was educated on exercise biomechanical correctness, intensity, and speed.     Assessment:  Pt demonstrates improved flexion AROM today but has lost another degree of extension. Pt advised not to place a pillow under her knee when she sleeps and to prop her leg up into extension for up to 10 min 3x/day. Pt v/u. Pt did well with all of her exercise interventions. Pt will continue to benefit from skilled PT interventions to address current functional deficits and impairments.       Plan: Progress to/Continue with current program. Dorothy Pews and Steps back for quad activation        Manual Therapy:    15     mins  05099;  Ultrasound:   0 mins 09808  Electrical Stimulation: __0/15____ mins 74311/  Iontophoresis: 0 mins 78553  Traction: 0 mins  28256  Work Conditionin mins 97106  Therapeutic Exercise:    45     mins  17039;     Neuromuscular Veena:    0    mins  50154;    Therapeutic Activity:     0     mins  54687;     Timed Treatment:   60   mins   Total Treatment:     75   mins    Sydney  FLORENCE London, [place]  [place] License # [id number]  Physical Therapist Assistant

## 2021-08-16 ENCOUNTER — TREATMENT (OUTPATIENT)
Dept: PHYSICAL THERAPY | Facility: CLINIC | Age: 76
End: 2021-08-16

## 2021-08-16 DIAGNOSIS — Z96.651 S/P TOTAL KNEE ARTHROPLASTY, RIGHT: Primary | ICD-10-CM

## 2021-08-16 DIAGNOSIS — Z74.09 IMPAIRED FUNCTIONAL MOBILITY, BALANCE, GAIT, AND ENDURANCE: ICD-10-CM

## 2021-08-16 PROCEDURE — 97140 MANUAL THERAPY 1/> REGIONS: CPT | Performed by: PHYSICAL THERAPIST

## 2021-08-16 PROCEDURE — G0283 ELEC STIM OTHER THAN WOUND: HCPCS | Performed by: PHYSICAL THERAPIST

## 2021-08-16 PROCEDURE — 97110 THERAPEUTIC EXERCISES: CPT | Performed by: PHYSICAL THERAPIST

## 2021-08-16 NOTE — PROGRESS NOTES
Physical Therapy Daily Progress Note        VISIT#: 7      Jerilyn Martinez reports: She has been up and walking around a lot today. Stated the bandage came off her incision.   Current Pain Level:    2/10; Worst:   4-5/10; Best:  0/10  Location Of Pain: R knee, anterior  Response to Previous Session: Good. No issues  Functional Deficits/Irritating Factors: sit<>stands, stairs, bending, squatting  Progression: improving  Compliance with HEP Reported: Yes. Has been propping her leg into extension more.     Objective   Presents: ambulating with very little antalgia today  Increased sets/reps of:  none   Increased resistance on:  none  Added to Program: AROM R knee flexion = 115 deg; Extension = lacking 2 deg        See Exercise, Manual, and Modality Logs for complete treatment.     Patient Education: Pt was educated on exercise biomechanical correctness, intensity, and speed.     Assessment:  Pt AROM was the same today as last session but a couple degrees improvement in extension - pt reported propping up her leg more at home and also stopped putting a pillow under her knee at night per discussion last time. Pt able to progress through all of her exercises w/o any issues of increased pain. Some balance issues are still present band needs additional strengthening but overall pt is progressing well towards her goals.  Pt will continue to benefit from skilled PT interventions to address current functional deficits and impairments.       Plan: Progress to/Continue with current program. Dorothy zarate for quad        Manual Therapy:    15     mins  00237;  Ultrasound:   0 mins 32772  Electrical Stimulation: __15____ mins 40853/  Iontophoresis: 0 mins 84532  Traction: 0 mins  40519  Work Conditionin mins 74992  Therapeutic Exercise:    15/60     mins  90637;     Neuromuscular Veena:    0    mins  58451;    Therapeutic Activity:     0     mins  43892;     Timed Treatment:   30   mins   Total Treatment:     90    ren London, PTA  [place] License # [id number]  Physical Therapist Assistant

## 2021-08-17 ENCOUNTER — HOSPITAL ENCOUNTER (OUTPATIENT)
Dept: CARDIOLOGY | Facility: HOSPITAL | Age: 76
Discharge: HOME | End: 2021-08-17
Admitting: ORTHOPAEDIC SURGERY

## 2021-08-17 DIAGNOSIS — Z96.651 S/P TKR (TOTAL KNEE REPLACEMENT) USING CEMENT, RIGHT: ICD-10-CM

## 2021-08-17 DIAGNOSIS — I82.4Y1 ACUTE DEEP VEIN THROMBOSIS (DVT) OF PROXIMAL VEIN OF RIGHT LOWER EXTREMITY (HCC): ICD-10-CM

## 2021-08-17 LAB

## 2021-08-17 PROCEDURE — 93970 EXTREMITY STUDY: CPT

## 2021-08-18 ENCOUNTER — TREATMENT (OUTPATIENT)
Dept: PHYSICAL THERAPY | Facility: CLINIC | Age: 76
End: 2021-08-18

## 2021-08-18 DIAGNOSIS — Z74.09 IMPAIRED FUNCTIONAL MOBILITY, BALANCE, GAIT, AND ENDURANCE: ICD-10-CM

## 2021-08-18 DIAGNOSIS — Z96.651 S/P TOTAL KNEE ARTHROPLASTY, RIGHT: Primary | ICD-10-CM

## 2021-08-18 PROCEDURE — 97110 THERAPEUTIC EXERCISES: CPT | Performed by: PHYSICAL THERAPIST

## 2021-08-18 PROCEDURE — 97112 NEUROMUSCULAR REEDUCATION: CPT | Performed by: PHYSICAL THERAPIST

## 2021-08-18 PROCEDURE — 97140 MANUAL THERAPY 1/> REGIONS: CPT | Performed by: PHYSICAL THERAPIST

## 2021-08-18 NOTE — PROGRESS NOTES
Physical Therapy Daily Progress Note        VISIT#: 8      Jerilyn Martinez reports: Her scan came back negative for blood clots - she does not have any anymore. She reports being very tired today. She has not been getting much sleep secondary to nerve sensitivity. Even the sheets touching her knee bother her and keep her awake. She has not been icing at home lately. She has not been doing reciprocal pattern with stairs. She has been doing light housework w/o increased symptoms. She is walking about 2200 steps a day.   Current Pain Level:    0/10; Worst:   minimal/10; Best:  0/10  Location Of Pain: R knee  Response to Previous Session: Good. No issues  Functional Deficits/Irritating Factors: sit<>stands, stairs, bending, squatting  Progression: Improving  Compliance with HEP Reported: Yes    Objective   Presents: Mild antalgia and edema  Increased sets/reps of:  none   Increased resistance on:  Diagonal walking  Added to Program: Spiritism Pews for quad activation, standing knee flexion stretch, machine curls  AROM R knee flexion = 116 deg; extension = lacking 3 deg      See Exercise, Manual, and Modality Logs for complete treatment.     Patient Education: Pt was educated on exercise biomechanical correctness, intensity, and speed. Discussion on trying some Tylenol PM before bed and trying some ice to calm nerve conduction speed. Pt also encouraged to begin icing again at home 3x/day for 15-20 min to keep try and keep nerves calm.     Assessment:  Pt more fatigued today so eliminated a few exercises. Pt AROM flexion was improved by a degree but extension lost a degree since last visit. Pt still with mild edema which may be limiting her reaching full extension. Also added an additional quad activation exercise to hopefully assist with achieving full extension. Pt will continue to benefit from skilled PT interventions to address current functional deficits and impairments.       Plan: Progress to/Continue with current  program.      Manual Therapy:    15     mins  65731;  Ultrasound:   0 mins 76742  Electrical Stimulation: __0/15____ mins 87916/  Iontophoresis: 0 mins 61696  Traction: 0 mins  77037  Work Conditionin mins 70801  Therapeutic Exercise:    65     mins  85921;     Neuromuscular Veena:    0    mins  78530;    Therapeutic Activity:     10     mins  87586;     Timed Treatment:   60   mins   Total Treatment:     105   mins    Sydney London, [place]  [place] License # [id number]  Physical Therapist Assistant

## 2021-08-20 ENCOUNTER — TREATMENT (OUTPATIENT)
Dept: PHYSICAL THERAPY | Facility: CLINIC | Age: 76
End: 2021-08-20

## 2021-08-20 DIAGNOSIS — Z74.09 IMPAIRED FUNCTIONAL MOBILITY, BALANCE, GAIT, AND ENDURANCE: ICD-10-CM

## 2021-08-20 DIAGNOSIS — Z96.651 S/P TOTAL KNEE ARTHROPLASTY, RIGHT: Primary | ICD-10-CM

## 2021-08-20 DIAGNOSIS — Z96.651 S/P TKR (TOTAL KNEE REPLACEMENT) USING CEMENT, RIGHT: ICD-10-CM

## 2021-08-20 PROCEDURE — 97110 THERAPEUTIC EXERCISES: CPT | Performed by: PHYSICAL THERAPIST

## 2021-08-20 PROCEDURE — G0283 ELEC STIM OTHER THAN WOUND: HCPCS | Performed by: PHYSICAL THERAPIST

## 2021-08-20 PROCEDURE — 97140 MANUAL THERAPY 1/> REGIONS: CPT | Performed by: PHYSICAL THERAPIST

## 2021-08-20 RX ORDER — HYDROCODONE BITARTRATE AND ACETAMINOPHEN 7.5; 325 MG/1; MG/1
TABLET ORAL
Qty: 20 TABLET | Refills: 0 | Status: SHIPPED | OUTPATIENT
Start: 2021-08-20 | End: 2021-11-18

## 2021-08-20 NOTE — TELEPHONE ENCOUNTER
PT is asking is there anything else she can take for sleeping because PT wants to try and save pain medication for physical therapy, PT also asking for refill for pain medication jst enough to complete Physical Therapy

## 2021-08-20 NOTE — PROGRESS NOTES
Physical Therapy Daily Progress Note        VISIT#: 9      Jerilyn Martinez reports: She is still having difficulty sleeping. She plans to call her MD about it and to see if there is anything he can give her to help her sleep. She is not having any pain this morning but she did n't do her stretches and she feels tight.   Current Pain Level:    0/10; Worst:   minimal/10; Best:  0/10  Location Of Pain: K knee  Response to Previous Session: Good. No issues  Functional Deficits/Irritating Factors: sit<>stands, stairs, bending, squatting  Progression: improving  Compliance with HEP Reported: Yes    Objective   Presents: mild edema and antalgia  Increased sets/reps of:  none   Increased resistance on:  none  Added to Program: non    AROM R knee flexion = 119 deg; Extension = lacking 3 deg      See Exercise, Manual, and Modality Logs for complete treatment.     Patient Education: Pt was educated on exercise biomechanical correctness, intensity, and speed.     Assessment:  Pt doing very well. She increased her knee flexion AROM to 119 deg today. Her knee extension, however, continues to plateau at lacking 3 deg. She does have some remaining edema in her knee which may be hindering her progress in that direction. Pt is having difficulty sleeping - she tried over the counter sleeping aid and various other sleep hygiene suggestions that were given her last PT session,  but nothing is helping her. She will call her MD about the situation.  Pt will continue to benefit from skilled PT interventions to address current functional deficits and impairments.       Plan: Progress to/Continue with current program.         Manual Therapy:    15     mins  84038;  Ultrasound:   0 mins 31304  Electrical Stimulation: __15____ mins 70489/  Iontophoresis: 0 mins 86928  Traction: 0 mins  85426  Work Conditionin mins 49525  Therapeutic Exercise:    15/     mins  29324;     Neuromuscular Veena:    0    mins  36516;    Therapeutic  Activity:   0       mins  35656;     Timed Treatment:   30   mins   Total Treatment:     115   mins    Sydney London, [place]  [place] License # [id number]  Physical Therapist Assistant

## 2021-08-20 NOTE — TELEPHONE ENCOUNTER
Patient will have to get sleep medicine from her family doctor.  That is not something we prescribed.

## 2021-08-23 ENCOUNTER — TREATMENT (OUTPATIENT)
Dept: PHYSICAL THERAPY | Facility: CLINIC | Age: 76
End: 2021-08-23

## 2021-08-23 DIAGNOSIS — Z96.651 S/P TOTAL KNEE ARTHROPLASTY, RIGHT: Primary | ICD-10-CM

## 2021-08-23 DIAGNOSIS — Z74.09 IMPAIRED FUNCTIONAL MOBILITY, BALANCE, GAIT, AND ENDURANCE: ICD-10-CM

## 2021-08-23 DIAGNOSIS — M25.561 ACUTE PAIN OF RIGHT KNEE: ICD-10-CM

## 2021-08-23 PROCEDURE — G0283 ELEC STIM OTHER THAN WOUND: HCPCS | Performed by: PHYSICAL THERAPIST

## 2021-08-23 PROCEDURE — 97140 MANUAL THERAPY 1/> REGIONS: CPT | Performed by: PHYSICAL THERAPIST

## 2021-08-23 PROCEDURE — 97110 THERAPEUTIC EXERCISES: CPT | Performed by: PHYSICAL THERAPIST

## 2021-08-23 PROCEDURE — 97530 THERAPEUTIC ACTIVITIES: CPT | Performed by: PHYSICAL THERAPIST

## 2021-08-23 NOTE — PROGRESS NOTES
Physical Therapy Daily Progress Note    Patient: Jerilyn Martinez   : 1945  Referring practitioner: ADELITA Aponte  Today's Date: 2021  Patient seen for 10 sessions    Visit Diagnoses:    ICD-10-CM ICD-9-CM   1. S/P total knee arthroplasty, right  Z96.651 V43.65   2. Impaired functional mobility, balance, gait, and endurance  Z74.09 V49.89   3. Acute pain of right knee  M25.561 719.46       Subjective   Jerilyn Martinez reports: that she is having some distal quad soreness now.  States that she is still not sleeping well. Tried the Tylenol pm and Melatonin and still had trouble.  States that she was able to climb up steps with the reciparocal pattern.  Unable to come down that way though.       Objective   Heel slide - 0 - 120* after manual therapy    One area of restricted scar mobility    See Exercise, Manual, and Modality Logs for complete treatment.     Patient Education: cont HEP, increase time walking the neighborhood, wear compressive sleeve during day to help alleviate swelling    Assessment/Plan  Jerilyn is progressing very nicely post TKR.  Her motion is functional and the strength is improving.  She has more swelling circa the knee than desired.     Progress strengthening /stabilization /functional activity           Timed:  Manual Therapy:    15     mins  76461;  Therapeutic Exercise:    10/30     mins  45741;     Neuromuscular Veena:    10    mins  71735;    Therapeutic Activity:     10     mins  37661;     Ultrasound:              mins  33990;    Iontophoresis              ____    mins  Dry Needling               ____    mins        Untimed:  Electrical Stimulation:       15      mins  07133 ( );  Mechanical Traction:             mins  93557;     Timed Treatment:   45   mins   Total Treatment:     80   mins    Eliza Desouza, PT  [place] License # [id number]  Physical Therapist

## 2021-08-25 ENCOUNTER — TREATMENT (OUTPATIENT)
Dept: PHYSICAL THERAPY | Facility: CLINIC | Age: 76
End: 2021-08-25

## 2021-08-25 DIAGNOSIS — Z74.09 IMPAIRED FUNCTIONAL MOBILITY, BALANCE, GAIT, AND ENDURANCE: ICD-10-CM

## 2021-08-25 DIAGNOSIS — Z96.651 S/P TOTAL KNEE ARTHROPLASTY, RIGHT: Primary | ICD-10-CM

## 2021-08-25 PROCEDURE — G0283 ELEC STIM OTHER THAN WOUND: HCPCS | Performed by: PHYSICAL THERAPIST

## 2021-08-25 PROCEDURE — 97140 MANUAL THERAPY 1/> REGIONS: CPT | Performed by: PHYSICAL THERAPIST

## 2021-08-25 PROCEDURE — 97110 THERAPEUTIC EXERCISES: CPT | Performed by: PHYSICAL THERAPIST

## 2021-08-25 NOTE — PROGRESS NOTES
Physical Therapy Daily Progress Note        VISIT#: 11      Jerilyn Martinez reports: She feels sore this morning. She got up and got going but did not stretch. She is still not sleeping well but she is going to call her internal medicine MD. .   Current Pain Level:    2-3/10; Worst:   3-4/10; Best:  0/10  Location Of Pain: R knee, lateral and superior to patella  Response to Previous Session: Good. No issues  Functional Deficits/Irritating Factors: sit<>stand, descending stairs, bending, squatting  Progression: Improving  Compliance with HEP Reported: Yes    Objective   Presents: Mild edema and antalgia  Increased sets/reps of:  none   Increased resistance on:  none  Added to Program: none    R knee AROM flexion = 120 deg; Extension = 0 deg      See Exercise, Manual, and Modality Logs for complete treatment.     Patient Education: Pt was educated on exercise biomechanical correctness, intensity, and speed. Cues to keep knees over foot during step up and over.     Assessment:  Pt is progressing towards completing her goals. She has met all short term goals with exception of sleeping difficulties. Her Range of motion is very good and her function and strength continue to improve. She is approaching d/c from PT but will continue to benefit from skilled PT interventions to address current functional deficits and impairments.       Plan: Progress to/Continue with current program. [place]        Manual Therapy:    15     mins  81706;  Ultrasound:   0 mins 45623  Electrical Stimulation: __15____ mins 84628/  Iontophoresis: 0 mins 23251  Traction: 0 mins  15292  Work Conditionin mins 10783  Therapeutic Exercise:    15/45     mins  92091;     Neuromuscular Veena:    0    mins  10990;    Therapeutic Activity:     0     mins  47466;     Timed Treatment:   30   mins   Total Treatment:     75   mins    [place] Magaña License # [id number]  Physical Therapist Assistant

## 2021-08-27 ENCOUNTER — TREATMENT (OUTPATIENT)
Dept: PHYSICAL THERAPY | Facility: CLINIC | Age: 76
End: 2021-08-27

## 2021-08-27 DIAGNOSIS — Z74.09 IMPAIRED FUNCTIONAL MOBILITY, BALANCE, GAIT, AND ENDURANCE: ICD-10-CM

## 2021-08-27 DIAGNOSIS — Z96.651 S/P TOTAL KNEE ARTHROPLASTY, RIGHT: Primary | ICD-10-CM

## 2021-08-27 PROCEDURE — 97110 THERAPEUTIC EXERCISES: CPT | Performed by: PHYSICAL THERAPIST

## 2021-08-27 PROCEDURE — 97140 MANUAL THERAPY 1/> REGIONS: CPT | Performed by: PHYSICAL THERAPIST

## 2021-08-27 PROCEDURE — G0283 ELEC STIM OTHER THAN WOUND: HCPCS | Performed by: PHYSICAL THERAPIST

## 2021-08-27 NOTE — PROGRESS NOTES
Physical Therapy Daily Progress Note        VISIT#: 12      Jerilyn Martinez reports: She called her MD about her sleeping but she would not prescribe anything since she is still taking pain medicine.   Current Pain Level:    1/10; Worst:   3/10; Best:  0/10  Location Of Pain: R knee  Response to Previous Session: Good. No issues  Functional Deficits/Irritating Factors: sit<>stand, bending, squatting  Progression: Improving  Compliance with HEP Reported: Yes    Objective   Presents: Normal ambulation, mild edema,   Increased sets/reps of:  none   Increased resistance on: Increased step up and over height, depth of squat  Added to Program: Lunges, squats from mat table, Single leg press, TM side stepping    AROM R knee flexion = 123 deg; Extension = 0 deg      See Exercise, Manual, and Modality Logs for complete treatment.     Patient Education: Pt was educated on exercise biomechanical correctness, intensity, and speed.     Assessment:  Pt had difficulty performing full squat to mat table w/o knee going into genu valgus and hips shifting wt to her L. Added Airex and pt able to perform w/o compensating. Added sidestepping to focus on glut med and lunges for balance and strengthening.  Overall, pt is doing very well. Her AROM flexion increased again today and extension remains at 0 deg. Pt will continue to benefit from skilled PT interventions to address current functional deficits and impairments.       Plan: Progress to/Continue with current program.         Manual Therapy:    10     mins  60025;  Ultrasound:   0 mins 20608  Electrical Stimulation: __15____ mins 08383/  Iontophoresis: 0 mins 49425  Traction: 0 mins  86425  Work Conditionin mins 31934  Therapeutic Exercise:    35/50     mins  54553;     Neuromuscular Veena:    0    mins  85157;    Therapeutic Activity:     0     mins  97247;     Timed Treatment:   35   mins   Total Treatment:     75   mins    VITALY Magaña License # [id number]  Physical  Therapist Assistant

## 2021-08-30 ENCOUNTER — TREATMENT (OUTPATIENT)
Dept: PHYSICAL THERAPY | Facility: CLINIC | Age: 76
End: 2021-08-30

## 2021-08-30 DIAGNOSIS — Z74.09 IMPAIRED FUNCTIONAL MOBILITY, BALANCE, GAIT, AND ENDURANCE: ICD-10-CM

## 2021-08-30 DIAGNOSIS — Z96.651 S/P TOTAL KNEE ARTHROPLASTY, RIGHT: Primary | ICD-10-CM

## 2021-08-30 PROCEDURE — 97110 THERAPEUTIC EXERCISES: CPT | Performed by: PHYSICAL THERAPIST

## 2021-08-30 PROCEDURE — 97140 MANUAL THERAPY 1/> REGIONS: CPT | Performed by: PHYSICAL THERAPIST

## 2021-08-30 PROCEDURE — G0283 ELEC STIM OTHER THAN WOUND: HCPCS | Performed by: PHYSICAL THERAPIST

## 2021-08-30 NOTE — PROGRESS NOTES
Physical Therapy Daily Progress Note        VISIT#: 13      Jerilyn Martinez reports: She is beginning to sleep better. She is taking Tylenol PM and melatonin. Stated she is feeling 100% better. She is sore after exercise but it calms down.,   Current Pain Level:    0/10; Worst:   1-2/10; Best:  0/10  Location Of Pain: R knee  Response to Previous Session: Good. No issues  Functional Deficits/Irritating Factors: sit<>stand, sleeping  Progression: improving  Compliance with HEP Reported: Yes    Objective   Presents: mild edema  Increased sets/reps of:  Lunges, knee curls   Increased resistance on:  none  Added to Program: 3 way SLS   AROM R knee flexion = 123 deg; Extension = 0 deg        See Exercise, Manual, and Modality Logs for complete treatment.     Patient Education: Pt was educated on exercise biomechanical correctness, intensity, and speed.     Assessment:  Pt is maintaining her AROM at 123 deg flexion and 0 deg extension. She is sleeping better and pain and soreness are diminishing. Her SLS demonstrated fair balance. She needed UE assist quite frequently to maintain her balance.  Pt will continue to benefit from skilled PT interventions to address current functional deficits and impairments.       Plan: Progress to/Continue with current program.         Manual Therapy:    10     mins  30122;  Ultrasound:   0 mins 07976  Electrical Stimulation: __15____ mins 31255/  Iontophoresis: 0 mins 01184  Traction: 0 mins  68089  Work Conditionin mins 91895  Therapeutic Exercise:    20/60  mins  09798;     Neuromuscular Veena: 0       mins  04863;    Therapeutic Activity:     0     mins  20828;     Timed Treatment:   30   mins   Total Treatment:     105   mins    Sydney London PTA  [place] License # [id number]  Physical Therapist Assistant

## 2021-09-01 ENCOUNTER — TREATMENT (OUTPATIENT)
Dept: PHYSICAL THERAPY | Facility: CLINIC | Age: 76
End: 2021-09-01

## 2021-09-01 DIAGNOSIS — Z96.651 S/P TOTAL KNEE ARTHROPLASTY, RIGHT: Primary | ICD-10-CM

## 2021-09-01 DIAGNOSIS — Z74.09 IMPAIRED FUNCTIONAL MOBILITY, BALANCE, GAIT, AND ENDURANCE: ICD-10-CM

## 2021-09-01 PROCEDURE — 97112 NEUROMUSCULAR REEDUCATION: CPT | Performed by: PHYSICAL THERAPIST

## 2021-09-01 PROCEDURE — 97140 MANUAL THERAPY 1/> REGIONS: CPT | Performed by: PHYSICAL THERAPIST

## 2021-09-01 PROCEDURE — 97110 THERAPEUTIC EXERCISES: CPT | Performed by: PHYSICAL THERAPIST

## 2021-09-01 PROCEDURE — 97530 THERAPEUTIC ACTIVITIES: CPT | Performed by: PHYSICAL THERAPIST

## 2021-09-01 NOTE — PROGRESS NOTES
Physical Therapy Daily Progress Note        VISIT#: 14      Jerilyn Martinez reports: She is feeling a lot better and she is sleeping better too. She has some shooting pains last night but is better this morning. She walked 3 miles yesterday.   Current Pain Level:    0/10; Worst:   2/10; Best:  0/10  Location Of Pain: R knee  Response to Previous Session: Good. No issues  Functional Deficits/Irritating Factors: sit<stand, sleeping, prolonged ambulation  Progression: improving  Compliance with HEP Reported: Yes    Objective   Presents: Slight antalgia, mild edema  Increased sets/reps of:  none   Increased resistance on:  none  Added to Program: flight of stairs    AROM R knee flexion = 125 deg; Extension = 0 deg      See Exercise, Manual, and Modality Logs for complete treatment.     Patient Education: Pt was educated on exercise biomechanical correctness, intensity, and speed.     Assessment:  Pt is doing very well. She made gains in knee flexion again today to 125 deg and extension remains at 0 deg. Pt does demonstrate some deficits in balance, NMC and strength. Her knee goes into genu valgus during stair descent but is able to correct somewhat with cues and also goes into genu valgus during sit<stand. She also improves with tactile cues. Static SL standing balance is fair. Pt will continue to benefit from skilled PT interventions to address current functional deficits and impairments.       Plan: Progress to/Continue with current program. Large stride marching for balance/cones/hurdles        Manual Therapy:    10     mins  32481;  Ultrasound:   0 mins 74325  Electrical Stimulation: __0/15____ mins 33800/  Iontophoresis: 0 mins 50122  Traction: 0 mins  05912  Work Conditionin mins 30994  Therapeutic Exercise:    20     mins  57900;     Neuromuscular Veena:    20    mins  92626;    Therapeutic Activity:     10     mins  07544;     Timed Treatment:   60   mins   Total Treatment:     75   mins    Sydney HENRIQUEZ  [facility]  [place] License # [id number]  Physical Therapist Assistant

## 2021-09-03 ENCOUNTER — TREATMENT (OUTPATIENT)
Dept: PHYSICAL THERAPY | Facility: CLINIC | Age: 76
End: 2021-09-03

## 2021-09-03 DIAGNOSIS — Z74.09 IMPAIRED FUNCTIONAL MOBILITY, BALANCE, GAIT, AND ENDURANCE: ICD-10-CM

## 2021-09-03 DIAGNOSIS — Z96.651 S/P TOTAL KNEE ARTHROPLASTY, RIGHT: Primary | ICD-10-CM

## 2021-09-03 PROCEDURE — 97110 THERAPEUTIC EXERCISES: CPT | Performed by: PHYSICAL THERAPIST

## 2021-09-03 PROCEDURE — 97140 MANUAL THERAPY 1/> REGIONS: CPT | Performed by: PHYSICAL THERAPIST

## 2021-09-03 PROCEDURE — 97112 NEUROMUSCULAR REEDUCATION: CPT | Performed by: PHYSICAL THERAPIST

## 2021-09-03 NOTE — PROGRESS NOTES
Physical Therapy Daily Progress Note        VISIT#: 15      Jerilyn Martinez reports: she walked further yesterday than she had been doing (approx 15 min walk). She was a little sore later. She had trouble sleeping again last night - she had a hard time getting her mind to stop thinking about things.   Current Pain Level:    1-2/10; Worst:   2/10; Best:  0/10  Location Of Pain: R knee  Response to Previous Session: Good. No issues  Functional Deficits/Irritating Factors: sit<stand, prolonged ambulation, squatting, sleeping  Progression: Improving  Compliance with HEP Reported: Yes    Objective   Presents: Mild edema, slight antalgia  Increased sets/reps of:  stairs  Increased resistance on:  Sit<stand  Added to Program: none    AROM R knee flexion = 126 deg; Extension = 0 deg      See Exercise, Manual, and Modality Logs for complete treatment.     Patient Education: Pt was educated on exercise biomechanical correctness, intensity, and speed.     Assessment:  Pt again improved by one deg of flexion. Continues to maintain full extension. Improved sit<stand pattern with little to no genu valgus and on lower surface today. Pt needed less UE assist for SLS. Progressing towards goals. Should be ready for d/c next week.  Pt will continue to benefit from skilled PT interventions to address current functional deficits and impairments.       Plan: Progress to/Continue with current program. Heel Taps for eccentric quad control/strenthening        Manual Therapy:    10     mins  09188;  Ultrasound:   0 mins 57854  Electrical Stimulation: __0/15____ mins 14485/  Iontophoresis: 0 mins 15866  Traction: 0 mins  67063  Work Conditionin mins 78153  Therapeutic Exercise:    25/65    mins  46164;     Neuromuscular Veena:    15    mins  36773;    Therapeutic Activity:     0     mins  94046;     Timed Treatment:   60   mins   Total Treatment:     105   mins    VITALY Magaña License # [id number]  Physical Therapist  Assistant

## 2021-09-07 ENCOUNTER — TREATMENT (OUTPATIENT)
Dept: PHYSICAL THERAPY | Facility: CLINIC | Age: 76
End: 2021-09-07

## 2021-09-07 DIAGNOSIS — Z74.09 IMPAIRED FUNCTIONAL MOBILITY, BALANCE, GAIT, AND ENDURANCE: ICD-10-CM

## 2021-09-07 DIAGNOSIS — Z96.651 S/P TOTAL KNEE ARTHROPLASTY, RIGHT: Primary | ICD-10-CM

## 2021-09-07 PROCEDURE — 97140 MANUAL THERAPY 1/> REGIONS: CPT | Performed by: PHYSICAL THERAPIST

## 2021-09-07 PROCEDURE — 97110 THERAPEUTIC EXERCISES: CPT | Performed by: PHYSICAL THERAPIST

## 2021-09-07 PROCEDURE — G0283 ELEC STIM OTHER THAN WOUND: HCPCS | Performed by: PHYSICAL THERAPIST

## 2021-09-07 PROCEDURE — 97530 THERAPEUTIC ACTIVITIES: CPT | Performed by: PHYSICAL THERAPIST

## 2021-09-07 NOTE — PROGRESS NOTES
MD Letter - Reassessment    Patient: Jerilyn Martinez   : 1945    Date of Initial Visit: Type: THERAPY  Noted: 2021  Today's Date: 2021  Patient seen for 16 sessions    Treatment has included: therapeutic exercise, neuromuscular re-education, manual therapy, therapeutic activity, electrical stimulation and cryotherapy    Subjective   Jerilyn states that she is feeling quite good now.  Notes that she is much more active at home and has very little pain.  She states that the pain does increased to about a 3/10 after increased activity but usually has no pain.  She reports being able to perform all self care without problems  Still has discomfort with coming down steps.   Objective - she presents walking with a normal gait pattern, but does have some persistent swelling in the knee  Knee AROM - 0-124*  Strength -  Quads   Hamstrings   Sensation - persistent altered light touch sensation on the lateral lower leg  Palpation - slight restriction of scar mobility  Special tests - negative for any signs of instability in the knee  Activity tolerances - she is able to walk on level surfaces for about a half mile without altered gait pattern or pain.  She is able to walk up/down inclines without pain or loss of balance but still does have a bit of trouble with steps, especially descending them    Assessment/Plan  Patient has demonstrated significant improvement since the initiation of therapy.  The pain has nearly resolved.  The motion has increased to a functional level.  The activity tolerances have increased to near desired levels.  I feel that the patient maybenefit from a few additional sessions to focus on her HEP but if she feels that she is comfortable with her program could be discharged from formal therapy.  If you have any questions concerning the care, please do not hesitate to contact me.          PT Signature: Eliza Desouza, PT        Manual Therapy:    10     mins  89836;  Therapeutic Exercise:     20/30     mins  39595;     Neuromuscular Veena:    0/10    mins  51956;    Therapeutic Activity:     10     mins  67918;   Assessed status and progression of HEP    Timed Treatment:   40   mins   Total Treatment:     90   mins

## 2021-09-09 ENCOUNTER — OFFICE VISIT (OUTPATIENT)
Dept: ORTHOPEDIC SURGERY | Facility: CLINIC | Age: 76
End: 2021-09-09

## 2021-09-09 VITALS — WEIGHT: 139 LBS | TEMPERATURE: 98.9 F | BODY MASS INDEX: 22.34 KG/M2 | HEIGHT: 66 IN

## 2021-09-09 DIAGNOSIS — R52 PAIN: Primary | ICD-10-CM

## 2021-09-09 DIAGNOSIS — Z96.651 S/P TKR (TOTAL KNEE REPLACEMENT) USING CEMENT, RIGHT: ICD-10-CM

## 2021-09-09 PROCEDURE — 99024 POSTOP FOLLOW-UP VISIT: CPT | Performed by: NURSE PRACTITIONER

## 2021-09-09 PROCEDURE — 73562 X-RAY EXAM OF KNEE 3: CPT | Performed by: NURSE PRACTITIONER

## 2021-09-09 NOTE — PROGRESS NOTES
Jerilyn Martinez : 1945 MRN: [id number] DATE: 2021    DIAGNOSIS: 8 week follow up right total knee      SUBJECTIVE:Patient returns today for 8 week follow up of right total knee replacement. Patient reports doing well with no unusual complaints. Appears to be progressing appropriately.    OBJECTIVE:   Exam:. The incision is well healed. No sign of infection. Range of motion is measured at 0 to 125. The calf is soft and nontender with a negative Homans sign. Strength is progressing and the patient is ambulating appropriately.    DIAGNOSTIC STUDIES  Xrays: 3 views of the right knee (AP, lateral, and sunrise) were ordered and reviewed for evaluation of recent knee replacement. They demonstrate a well positioned, well aligned knee replacement without complicating factors noted. In comparison with previous films there has been no change.    ASSESSMENT: 8 week status post right knee replacement.    PLAN: 1) Continue with PT exercises as prescribed   2) Follow up in 10 months with [facility] for annual visit    ADELITA Aponte  2021

## 2021-09-10 ENCOUNTER — TREATMENT (OUTPATIENT)
Dept: PHYSICAL THERAPY | Facility: CLINIC | Age: 76
End: 2021-09-10

## 2021-09-10 ENCOUNTER — DOCUMENTATION (OUTPATIENT)
Dept: PHYSICAL THERAPY | Facility: CLINIC | Age: 76
End: 2021-09-10

## 2021-09-10 DIAGNOSIS — Z96.651 S/P TOTAL KNEE ARTHROPLASTY, RIGHT: Primary | ICD-10-CM

## 2021-09-10 DIAGNOSIS — Z74.09 IMPAIRED FUNCTIONAL MOBILITY, BALANCE, GAIT, AND ENDURANCE: ICD-10-CM

## 2021-09-10 PROCEDURE — 97110 THERAPEUTIC EXERCISES: CPT | Performed by: PHYSICAL THERAPIST

## 2021-09-10 PROCEDURE — 97140 MANUAL THERAPY 1/> REGIONS: CPT | Performed by: PHYSICAL THERAPIST

## 2021-09-10 NOTE — PROGRESS NOTES
Physical Therapy Daily Progress Note/Discharge Summary        VISIT#: 17      Jerilyn Martinez reports: Her knee is doing very well. Her MD was pleased with her progress and happy with her motion. Told her she could continue PT as long as she felt necessary. Pt reported she was ready to d/c today and just wanted her HEP.   Current Pain Level:    1-2/10; Worst:   1-2/10; Best:  0/10  Location Of Pain: R knee  Response to Previous Session: Good. No issues  Functional Deficits/Irritating Factors: sit <stands  Progression: met goals  Compliance with HEP Reported: Yes    Objective   Presents: very little edema  Increased sets/reps of:  none   Increased resistance on:  none  Added to Program: Step Down Touch w/heel, Airex w/SLS    Gave Additional/Final HEP: Access Code: [email or web address]  URL: [email or web address]  Date: 09/10/2021  Prepared by: Sydney London    Exercises  Side Stepping with Resistance at Thighs - 1 x daily - 6 x weekly - 2 sets - 15 reps - sec hold  Diagonal Forward Stepping with Resistance Loop - 1 x daily - 6 x weekly - 2 sets - 15 reps - sec hold  Standing Hip Flexion with Anchored Resistance and Chair Support - 1 x daily - 6 x weekly - 2 sets - 10 reps - sec hold  Standing Hip Extension with Anchored Resistance - 1 x daily - 6 x weekly - 2 sets - 10 reps - sec hold  Standing Hip Abduction with Anchored Resistance - 1 x daily - 6 x weekly - 2 sets - 10 reps - sec hold  Standing Hip Adduction with Anchored Resistance - 1 x daily - 6 x weekly - 2 sets - 10 reps - sec hold  Forward Step Down Touch with Heel - 1 x daily - 6 x weekly - 2 sets - 10 reps - sec hold  Single Leg Sit to Stand with Arms Crossed - 1 x daily - 6 x weekly - 2 sets - 15 reps - sec hold  Episcopalian Pew - 1 x daily - 6 x weekly - 2 sets - 10 reps - sec hold  Standard Lunge - 1 x daily - 6 x weekly - 2 sets - 10 reps - sec hold  Reverse Lunge - 1 x daily - 6 x weekly - 2 sets - 10 reps - sec hold  Single Leg Stance with 3-Way Kick on Foam -  1 x daily - 6 x weekly - 2 sets - 1 reps - 30 sec hold        See Exercise, Manual, and Modality Logs for complete treatment.     Patient Education: Pt was educated on exercise biomechanical correctness, intensity, and speed.  Pt given final HEP and reviewed it with her. Questions answered about exercise frequency and progressions.     Assessment: Pt has met all of her PT goals and is ready for a safe d/c to home and with an exercise program.     Plan: Discharge to [facility]      Manual Therapy:    10     mins  80542;  Ultrasound:   0 mins 47695  Electrical Stimulation: __0/15____ mins 11938/  Iontophoresis: 0 mins 92351  Traction: 0 mins  26971  Work Conditionin mins 60342  Therapeutic Exercise:    50     mins  28986;     Neuromuscular Veena:    0    mins  01555;    Therapeutic Activity:     0     mins  39133;     Timed Treatment:   60   mins   Total Treatment:     75   mins    Sydney London PTA  [place] License # [id number]  Physical Therapist Assistant

## 2021-11-01 ENCOUNTER — HOSPITAL ENCOUNTER (OUTPATIENT)
Dept: CT IMAGING | Facility: HOSPITAL | Age: 76
Discharge: HOME | End: 2021-11-01
Admitting: THORACIC SURGERY (CARDIOTHORACIC VASCULAR SURGERY)

## 2021-11-01 DIAGNOSIS — C34.11 MALIGNANT NEOPLASM OF UPPER LOBE OF RIGHT LUNG (HCC): ICD-10-CM

## 2021-11-01 DIAGNOSIS — Z48.3 AFTERCARE FOLLOWING SURGERY FOR NEOPLASM: ICD-10-CM

## 2021-11-01 PROCEDURE — 71250 CT THORAX DX C-: CPT

## 2021-11-10 ENCOUNTER — OFFICE VISIT (OUTPATIENT)
Dept: SURGERY | Facility: CLINIC | Age: 76
End: 2021-11-10

## 2021-11-10 VITALS
HEART RATE: 91 BPM | RESPIRATION RATE: 16 BRPM | SYSTOLIC BLOOD PRESSURE: 152 MMHG | BODY MASS INDEX: 22.32 KG/M2 | OXYGEN SATURATION: 98 % | WEIGHT: 138.89 LBS | HEIGHT: 66 IN | DIASTOLIC BLOOD PRESSURE: 83 MMHG

## 2021-11-10 DIAGNOSIS — Z48.3 AFTERCARE FOLLOWING SURGERY FOR NEOPLASM: ICD-10-CM

## 2021-11-10 DIAGNOSIS — C34.11 MALIGNANT NEOPLASM OF UPPER LOBE OF RIGHT LUNG (HCC): Primary | ICD-10-CM

## 2021-11-10 PROCEDURE — 99213 OFFICE O/P EST LOW 20 MIN: CPT | Performed by: THORACIC SURGERY (CARDIOTHORACIC VASCULAR SURGERY)

## 2021-11-10 NOTE — PROGRESS NOTES
"Chief Complaint    Aftercare for surgical resection lung cancer    Subjective          Jerilyn Martinez presents to [facility] THORACIC SURGERY FOR A 1Y CT CHEST FOLLOW UP.  History of Present Illness     76-year-old woman seen in the office today for further follow-up of a robot-assisted right upper lobectomy performed February 17, 2017 for a T1 a N0 M0 stage I adenocarcinoma the lung.  Patient continues to do well.  She is very active without significant shortness of breath or wheezing.  She has no cough or hemoptysis.  She has no hoarseness or change in her voice.  She has no pleuritic pain.  She has no unexplained weight loss.    Objective   Vital Signs:   /83 (BP Location: Right arm, Patient Position: Sitting, Cuff Size: Adult)   Pulse 91   Resp 16   Ht 167.6 cm (65.98\")   Wt 63 kg (138 lb 14.2 oz)   SpO2 98%   BMI 22.43 kg/m²     Physical Exam     Neck: No masses and no adenopathy    Pulmonary: Lungs are clear to auscultation with equal breath sounds bilaterally    Cardiac: Regular rate and rhythm.  No murmur or gallop.  No dependent edema.    Result Review :   The following data was reviewed by: Yuir Brizuela III, MD on 11/10/2021:    CT scan of the chest performed November 1, 2021 was independently reviewed and compared to previous scans.  Stable changes of right upper lobectomy.  3 mm nodule right middle lobe of the lung.  Several other small nodules in the right lung that remained unchanged.  No new infiltrates nodules or masses.  No pleural effusions.  No suspicious hilar or mediastinal adenopathy.         Assessment and Plan    Diagnoses and all orders for this visit:    1. Malignant neoplasm of upper lobe of right lung (HCC) (Primary)  -     CT Chest Without Contrast; Future    2. Aftercare following surgery for neoplasm  -     CT Chest Without Contrast; Future      Patient continues to do well for years after a right upper lobectomy for stage I adenocarcinoma.  Will " obtain CT scan of the chest in 6 months to follow-up on new 3 mm nodule in the middle lobe.  I will keep you informed of her progress.    I spent 20 minutes caring for Jerilyn on this date of service. This time includes time spent by me in the following activities:preparing for the visit, reviewing tests, performing a medically appropriate examination and/or evaluation , counseling and educating the patient/family/caregiver, ordering medications, tests, or procedures, referring and communicating with other health care professionals , documenting information in the medical record, independently interpreting results and communicating that information with the patient/family/caregiver and care coordination  Follow Up   Return in about 6 months (around 5/10/2022) for Recheck.  Patient was given instructions and counseling regarding her condition or for health maintenance advice. Please see specific information pulled into the AVS if appropriate.

## 2021-11-18 ENCOUNTER — OFFICE VISIT (OUTPATIENT)
Dept: ORTHOPEDIC SURGERY | Facility: CLINIC | Age: 76
End: 2021-11-18

## 2021-11-18 VITALS — WEIGHT: 140 LBS | HEIGHT: 66 IN | TEMPERATURE: 96.2 F | BODY MASS INDEX: 22.5 KG/M2

## 2021-11-18 DIAGNOSIS — M25.531 WRIST PAIN, RIGHT: Primary | ICD-10-CM

## 2021-11-18 DIAGNOSIS — M19.031 PRIMARY OSTEOARTHRITIS OF RIGHT WRIST: ICD-10-CM

## 2021-11-18 PROCEDURE — 73100 X-RAY EXAM OF WRIST: CPT | Performed by: NURSE PRACTITIONER

## 2021-11-18 PROCEDURE — 99213 OFFICE O/P EST LOW 20 MIN: CPT | Performed by: NURSE PRACTITIONER

## 2021-11-18 NOTE — PROGRESS NOTES
Patient Name: Jerilyn Martinez   YOB: 1945  Referring Primary Care Physician: Felisha Baez MD  BMI: Body mass index is 22.6 kg/m².    Chief Complaint:    Chief Complaint   Patient presents with   • Right Wrist - Pain        HPI: Pt here for right wrist pain that has been going on and off for the past couple weeks.  Denies any fall or injury.  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 female who presents today for evaluation of   Chief Complaint   Patient presents with   • Right Wrist - Pain         Subjective   Medications:   Home Medications:  Current Outpatient Medications on File Prior to Visit   Medication Sig   • levothyroxine (SYNTHROID, LEVOTHROID) 75 MCG tablet Take 75 mcg by mouth Daily.   • omeprazole (priLOSEC) 20 MG capsule Take 20 mg by mouth Daily.   • simvastatin (ZOCOR) 20 MG tablet 20 mg Every Night.   • [DISCONTINUED] HYDROcodone-acetaminophen (NORCO) 7.5-325 MG per tablet Take 1 tablet by mouth every 4 to 6 hours as needed for pain.   • [DISCONTINUED] ondansetron (Zofran) 4 MG tablet Take 1 tablet by mouth Every 8 (Eight) Hours As Needed for Nausea or Vomiting for up to 10 doses.   • [DISCONTINUED] rivaroxaban (Xarelto) 20 MG tablet Take 1 tablet by mouth Daily for 28 doses. 4 weeks     Current Facility-Administered Medications on File Prior to Visit   Medication   • Chlorhexidine Gluconate Cloth 2 % pads     Current Medications:  Scheduled Meds:  Continuous Infusions:No current facility-administered medications for this visit.    PRN Meds:.    I have reviewed the patient's medical history in detail and updated the computerized patient record.  Review and summarization of old records includes:    Past Medical History:   Diagnosis Date   • Arthritis    • Disease of thyroid gland     HYPOTHYROIDISM   • GERD (gastroesophageal reflux  disease)    • High cholesterol    • History of bilateral breast implants    • Iron deficiency anemia    • Lumbar stenosis     AND CERVICAL   • Lung cancer (HCC)     RIGHT UPPER LOBE   • Murmur, heart    • Periodic heart flutter     2O PLUS YEARS AGO   • Right knee pain    • Slow to wake up after anesthesia    • Slow to wake up after anesthesia    • Spinal stenosis    • Wears contact lenses         Past Surgical History:   Procedure Laterality Date   • APPENDECTOMY     • BUNIONECTOMY      X 2   • HYSTERECTOMY     • LASIK     • THORACOSCOPY Right 2017    Procedure: BRONCHOSCOPY, RIGHT VAT, ROBOT ASSISTED RIGHT UPPER LOBECTOMY, INTERCOSTAL NERVE BLOCK WITH CRYOA;  Surgeon: Yuri Brizuela III, MD;  Location: [facility];  Service:    • THYROID SURGERY      nodule removed   • TONSILLECTOMY AND ADENOIDECTOMY     • TOTAL KNEE ARTHROPLASTY Right 2021    Procedure: TOTAL KNEE ARTHROPLASTY;  Surgeon: Monroe Vela MD;  Location: [facility];  Service: Orthopedics;  Laterality: Right;        Social History     Occupational History   • Not on file   Tobacco Use   • Smoking status: Former Smoker     Packs/day: 2.00     Years: 20.00     Pack years: 40.00     Types: Cigarettes     Quit date:      Years since quittin.9   • Smokeless tobacco: Never Used   • Tobacco comment: QUIT OVER 30 YEARS AGO   Vaping Use   • Vaping Use: Never used   Substance and Sexual Activity   • Alcohol use: Yes     Comment: socially   • Drug use: No   • Sexual activity: Defer     Partners: Female     Birth control/protection: Post-menopausal      Social History     Social History Narrative   • Not on file        Family History   Problem Relation Age of Onset   • Lung cancer Mother    • Lung cancer Father    • Lung cancer Sister    • Malig Hyperthermia Neg Hx        ROS: 14 point review of systems was performed and all other systems were reviewed and are negative except for documented findings in HPI and today's encounter.  "    Allergies:   Allergies   Allergen Reactions   • Codeine Shortness Of Breath     CHEST PAIN;  Patient has taken Lortab/Norco in the past with no problem        Constitutional:  Denies fever, shaking or chills   Eyes:  Denies change in visual acuity   HENT:  Denies nasal congestion or sore throat   Respiratory:  Denies cough or shortness of breath   Cardiovascular:  Denies chest pain or severe LE edema   GI:  Denies abdominal pain, nausea, vomiting, bloody stools or diarrhea   Musculoskeletal:  Numbness, tingling, pain, or loss of motor function only as noted above in history of present illness.  : Denies painful urination or hematuria  Integument:  Denies rash, lesion or ulceration   Neurologic:  Denies headache or focal weakness  Endocrine:  Denies lymphadenopathy  Psych:  Denies confusion or change in mental status   Hem:  Denies active bleeding    OBJECTIVE:  Physical Exam: 76 y.o. female  Wt Readings from Last 3 Encounters:   11/18/21 63.5 kg (140 lb)   11/10/21 63 kg (138 lb 14.2 oz)   09/09/21 63 kg (139 lb)     Ht Readings from Last 1 Encounters:   11/18/21 167.6 cm (66\")     Body mass index is 22.6 kg/m².  Vitals:    11/18/21 0955   Temp: 96.2 °F (35.7 °C)     Vital signs reviewed.     General Appearance:    Alert, cooperative, in no acute distress                  Eyes: conjunctiva clear  ENT: external ears and nose atraumatic  CV: no peripheral edema  Resp: normal respiratory effort  Skin: no rashes or wounds; normal turgor  Psych: mood and affect appropriate  Lymph: no nodes appreciated  Neuro: gross sensation intact  Vascular:  Palpable peripheral pulse in noted extremity  Musculoskeletal Extremities: Right wrist skin is warm dry intact with good pulses and sensation she is tender over the ulnar styloid there is no scaphoid or navicular tenderness distal radius is nontender elbows nontender forearms nontender there is no apparent crepitation masses or lymphadenopathy good capillary " refill    Radiology:   Right wrist 2 views done for pain no comparison films no acute fracture some degenerative changes        Assessment:     ICD-10-CM ICD-9-CM   1. Wrist pain, right  M25.531 719.43   2. Primary osteoarthritis of right wrist  M19.031 715.13     We will place in a cock up splint she can wear at night and it for comfort and place referral for hand surgery  MDM/Plan:   The diagnosis(es), natural history, pathophysiology and treatment for diagnosis(es) were discussed. Opportunity given and questions answered.  Biomechanics of pertinent body areas discussed.  When appropriate, the use of ambulatory aids discussed.    The diagnosis(es), natural history, pathophysiology and treatment for diagnosis(es) were discussed. Opportunity given and questions answered.  Biomechanics of pertinent body areas discussed.  When appropriate, the use of ambulatory aids discussed.  EXERCISES:  Advice on benefits of, and types of regular/moderate exercise pertaining to orthopedic diagnosis(es).  MEDICATIONS:  The risks, benefits, warnings,side effects and alternatives of medications discussed.  Inflammation/pain control; with cold, heat, elevation and/or liniments discussed as appropriate  MEDICAL RECORDS reviewed from other provider(s) for past and current medical history pertinent to this complaint.      11/18/2021    Dictated utilizing Dragon dictation

## 2021-11-18 NOTE — PATIENT INSTRUCTIONS
Wrist Sprain, Adult  A wrist sprain is a stretch or tear in the strong tissues that connect the wrist bones to each other. These strong tissues are called ligaments. There are three types of wrist sprains:  · Grade 1. The ligament is stretched more than normal. There may be a minor amount of wrist pain.  · Grade 2. The ligament is partially torn. You may be able to move your wrist, but not very much. There may be a moderate amount of wrist pain.  · Grade 3. The ligament or ligaments are completely torn. You may find it difficult to move your wrist even a little. There may be a significant amount of wrist pain.  What are the causes?  This condition may be caused by using the wrist too much during sports, exercise, or work. It can also happen due to a fall or during an accident.  What increases the risk?  You are more likely to develop this condition if:  · You had a previous wrist or arm injury.  · You have poor wrist strength and flexibility.  · You play contact sports, such as football or soccer.  · You participate in sports that may result in a fall, such as skateboarding, biking, skiing, or snowboarding.  · You do not exercise regularly.  · You use exercise equipment that does not fit well.  What are the signs or symptoms?  Symptoms of this condition include:  · Pain in the wrist, arm, or hand.  · Swelling or bruised skin near the wrist, hand, or arm. The skin may look yellow or blue.  · Stiffness or trouble moving the hand.  · Hearing a noise, like a pop or a snap, at the time of injury, or feeling a tear at the time of the injury.  · A warm feeling in the skin around the wrist.  How is this diagnosed?  This condition is diagnosed with a physical exam. Sometimes an X-ray is taken to make sure a bone did not break. You may also have an MRI of your wrist to check for torn ligaments.  How is this treated?  This condition is treated by resting and applying ice to your wrist. Additional treatment may  include:  · Taking medicine for pain and inflammation.  · Wearing a splint, brace, or cast for a short period of time to keep your wrist from moving (immobilized).  · Doing exercises to strengthen and stretch your wrist.  · Having surgery. This may be done if the ligament is completely torn.  Follow these instructions at home:  If you have a splint or brace:  · Wear the splint or brace as told by your health care provider. Remove it only as told by your health care provider.  · Loosen it if your fingers tingle, become numb, or turn cold and blue.  · Keep it clean.  · If the splint or brace is not waterproof:  ? Do not let it get wet.  ? Cover it with a watertight covering when you take a bath or a shower.  If you have a cast:  · Do not put pressure on any part of the cast until it is fully hardened. This may take several hours.  · Do not stick anything inside the cast to scratch your skin. Doing that increases your risk of infection.  · Check the skin around the cast every day. Tell your health care provider about any concerns.  · You may put lotion on dry skin around the edges of the cast. Do not put lotion on the skin underneath the cast.  · Keep it clean.  · If the cast is not waterproof:  ? Do not let it get wet.  ? Cover it with a watertight covering when you take a bath or shower.  Managing pain, stiffness, and swelling    · If directed, put ice on the injured area. To do this:  ? If you have a removable splint or brace, remove it as told by your health care provider.  ? Put ice in a plastic bag.  ? Place a towel between your skin and the bag or between the splint or cast and the bag.  ? Leave the ice on for 20 minutes, 2-3 times a day.  ? Remove the ice if your skin turns bright red. This is very important. If you cannot feel pain, heat, or cold, you have a greater risk of damage to the area.  · Move your fingers often to reduce stiffness and swelling.  · Raise (elevate) the injured area above the level of  your heart while you are sitting or lying down.    Activity  · Rest your wrist as told by your health care provider. Do not do things that cause pain.  · Ask your health care provider when it is safe to drive if you have a splint, brace, or cast on your wrist.  · Do exercises as told by your health care provider.  · Return to your normal activities as told by your health care provider. Ask your health care provider what activities are safe for you.  General instructions  · Take over-the-counter and prescription medicines only as told by your health care provider.  · Do not use any products that contain nicotine or tobacco, such as cigarettes, e-cigarettes, and chewing tobacco. These can delay healing. If you need help quitting, ask your health care provider.  · Keep all follow-up visits. This is important.  Contact a health care provider if:  · Your pain, bruising, or swelling gets worse.  · Your skin becomes red, gets a rash, or has open sores.  · Your pain does not get better or it gets worse.  Get help right away if:  · You have a new or sudden sharp pain in the hand, arm, or wrist.  · You have tingling or numbness in your hand.  · Your fingers turn white, very red, or cold and blue.  · You cannot move your fingers.  Summary  · A wrist sprain is damage to ligaments in your wrist.  · Wrist sprains can range from mild to severe.  · Return to your normal activities as told by your health care provider. Ask your health care provider what activities are safe for you.  · You may need to wear a splint, brace, or cast for a short period of time.  This information is not intended to replace advice given to you by your health care provider. Make sure you discuss any questions you have with your health care provider.  Document Revised: 04/26/2021 Document Reviewed: 04/26/2021  Elsevier Patient Education © 2021 [employer]

## 2021-11-22 ENCOUNTER — TELEPHONE (OUTPATIENT)
Dept: ORTHOPEDIC SURGERY | Facility: CLINIC | Age: 76
End: 2021-11-22

## 2021-11-22 RX ORDER — CEPHALEXIN 500 MG/1
CAPSULE ORAL
Qty: 4 CAPSULE | Refills: 5 | Status: SHIPPED | OUTPATIENT
Start: 2021-11-22

## 2021-11-22 NOTE — TELEPHONE ENCOUNTER
Caller: Jerilyn Martinez    Relationship to patient: Self    Best call back number: [phone]    Patient is needing: PATIENT HAS A DENTAL APPT TOMORROW AT 8:00AM, SHE IS NEEDING AN ANTIBIOTIC CALLED INTO HER PHARMACY FOR THAT APPT.    NATANAEL Pérez

## 2022-01-12 ENCOUNTER — APPOINTMENT (OUTPATIENT)
Dept: WOMENS IMAGING | Facility: HOSPITAL | Age: 77
End: 2022-01-12

## 2022-01-12 PROCEDURE — 77063 BREAST TOMOSYNTHESIS BI: CPT | Performed by: RADIOLOGY

## 2022-01-12 PROCEDURE — 77067 SCR MAMMO BI INCL CAD: CPT | Performed by: RADIOLOGY

## 2022-02-23 ENCOUNTER — TRANSCRIBE ORDERS (OUTPATIENT)
Dept: ADMINISTRATIVE | Facility: HOSPITAL | Age: 77
End: 2022-02-23

## 2022-02-23 DIAGNOSIS — Z13.6 ENCOUNTER FOR SCREENING FOR VASCULAR DISEASE: Primary | ICD-10-CM

## 2022-03-08 NOTE — TELEPHONE ENCOUNTER
Patient notified new prescription was sent to [facility] pharmacy at [phone] for Keflex 500 mg, #4, directions are to take all 4 capsules 1 hour prior to her procedure.  Refill x5 per MLL   Topical Sulfur Applications Pregnancy And Lactation Text: This medication is Pregnancy Category C and has an unknown safety profile during pregnancy. It is unknown if this topical medication is excreted in breast milk.

## 2022-06-08 ENCOUNTER — HOSPITAL ENCOUNTER (OUTPATIENT)
Dept: CT IMAGING | Facility: HOSPITAL | Age: 77
Discharge: HOME | End: 2022-06-08
Admitting: THORACIC SURGERY (CARDIOTHORACIC VASCULAR SURGERY)

## 2022-06-08 DIAGNOSIS — C34.11 MALIGNANT NEOPLASM OF UPPER LOBE OF RIGHT LUNG: ICD-10-CM

## 2022-06-08 DIAGNOSIS — Z48.3 AFTERCARE FOLLOWING SURGERY FOR NEOPLASM: ICD-10-CM

## 2022-06-08 PROCEDURE — 71250 CT THORAX DX C-: CPT

## 2022-06-16 ENCOUNTER — OFFICE VISIT (OUTPATIENT)
Dept: SURGERY | Facility: CLINIC | Age: 77
End: 2022-06-16

## 2022-06-16 VITALS
WEIGHT: 132 LBS | OXYGEN SATURATION: 98 % | SYSTOLIC BLOOD PRESSURE: 130 MMHG | BODY MASS INDEX: 21.21 KG/M2 | DIASTOLIC BLOOD PRESSURE: 90 MMHG | HEIGHT: 66 IN | HEART RATE: 79 BPM

## 2022-06-16 DIAGNOSIS — R91.8 PULMONARY NODULES: ICD-10-CM

## 2022-06-16 DIAGNOSIS — C34.11 MALIGNANT NEOPLASM OF UPPER LOBE OF RIGHT LUNG: Primary | ICD-10-CM

## 2022-06-16 PROCEDURE — 99213 OFFICE O/P EST LOW 20 MIN: CPT | Performed by: NURSE PRACTITIONER

## 2022-06-16 NOTE — PROGRESS NOTES
"Chief Complaint  Non-small cell lung cancer follow-up    Subjective        Jerilyn Martinez presents to [facility] for follow-up.    History of Present Illness     Jerilyn Dorado is a pleasant 77-year-old female who presented to our office today for further evaluation and continued surveillance after undergoing a robot-assisted right upper lobectomy on 2/17/2017 for a T1 a N0 M0 stage I adenocarcinoma of the lung.  Her surgery was performed by Dr. Yuri Brizuela.  The patient has been doing well.  She is active and reports minimal shortness of air.  She denies fever, chills, cough or hemoptysis.  She has had no hoarseness or unintentional weight loss.  She has denies pleuritic pain or night sweats.  She does report problems with seasonal allergies, more so this spring than in the past.  She presents today with a follow-up CT of the chest.       Objective   Vital Signs:  /90 (BP Location: Right arm, Patient Position: Sitting, Cuff Size: Adult)   Pulse 79   Ht 167.6 cm (66\")   Wt 59.9 kg (132 lb)   SpO2 98%   BMI 21.31 kg/m²   Estimated body mass index is 21.31 kg/m² as calculated from the following:    Height as of this encounter: 167.6 cm (66\").    Weight as of this encounter: 59.9 kg (132 lb).    BMI is within normal parameters. No other follow-up for BMI required.      Physical Exam  Vitals and nursing note reviewed.   Constitutional:       Appearance: Normal appearance.   HENT:      Head: Normocephalic and atraumatic.   Cardiovascular:      Rate and Rhythm: Normal rate and regular rhythm.      Pulses: Normal pulses.      Heart sounds: Normal heart sounds.   Pulmonary:      Effort: Pulmonary effort is normal.      Breath sounds: Normal breath sounds. No wheezing, rhonchi or rales.   Abdominal:      General: Bowel sounds are normal.      Palpations: Abdomen is soft.   Skin:     General: Skin is warm and dry.   Neurological:      General: No focal deficit present.      " Mental Status: She is alert. Mental status is at baseline.        Result Review :  The following data was reviewed by: ADELITA Hinton on 06/16/2022:    Data reviewed: Radiologic studies CT of the chest performed on 6/8/2022 is independently reviewed by me.  There are stable postoperative changes s/p right upper lobectomy.  There are some areas of mucous plugging previously seen on CT scan that have since resolved.  A minimal nodular irregularity within the base of the right lower lobe is present and appears to be consistent with parenchymal scarring.  There is a new area of mixed density within the right lower lobe which measures up to 1 cm in size.  No evidence of pleural or pericardial effusion.  No pathological mediastinal lymphadenopathy.  Visualized upper abdomen demonstrates hyperdense left renal lesion measuring 2.1 cm as well as a small heterogeneous lesion in the upper pole the right kidney measuring approximately 9 mm.  This demonstrates intermediate density.            Assessment and Plan   Diagnoses and all orders for this visit:    1. Malignant neoplasm of upper lobe of right lung (HCC) (Primary)  -     CT Chest Without Contrast; Future    2. Pulmonary nodules  -     CT Chest Without Contrast; Future    Ms. Dorado continues to do well.  The previously seen areas of nodularity have resolved, likely secondary to mucous plugging.  There is some new abnormalities on the CT which appears to be infectious/inflammatory in etiology, but given her history we will recheck a CT of the chest in 3 months to reevaluate.  Concerning the renal lesions, I have recommended that she follow-up with her regular urologist, Dr. Hernández for further evaluation and management.  Thank you for allowing us to participate in the care of Jerilyn Dorado.  We will continue to keep informed of her progress.     I spent 26 minutes caring for Jerilyn on this date of service. This time includes time spent by me in the following  activities:preparing for the visit, reviewing tests, obtaining and/or reviewing a separately obtained history, performing a medically appropriate examination and/or evaluation , counseling and educating the patient/family/caregiver, ordering medications, tests, or procedures, referring and communicating with other health care professionals , documenting information in the medical record, independently interpreting results and communicating that information with the patient/family/caregiver and care coordination  Follow Up   Return in about 3 months (around 9/16/2022) for Next scheduled follow up with CT chest.  Patient was given instructions and counseling regarding her condition or for health maintenance advice. Please see specific information pulled into the AVS if appropriate.

## 2022-07-14 ENCOUNTER — OFFICE VISIT (OUTPATIENT)
Dept: ORTHOPEDIC SURGERY | Facility: CLINIC | Age: 77
End: 2022-07-14

## 2022-07-14 VITALS — WEIGHT: 137 LBS | HEIGHT: 65 IN | BODY MASS INDEX: 22.82 KG/M2

## 2022-07-14 DIAGNOSIS — Z96.651 S/P TKR (TOTAL KNEE REPLACEMENT) USING CEMENT, RIGHT: Primary | ICD-10-CM

## 2022-07-14 PROCEDURE — 73562 X-RAY EXAM OF KNEE 3: CPT | Performed by: ORTHOPAEDIC SURGERY

## 2022-07-14 PROCEDURE — 99212 OFFICE O/P EST SF 10 MIN: CPT | Performed by: ORTHOPAEDIC SURGERY

## 2022-07-14 NOTE — PROGRESS NOTES
"Jerilyn Martinez : 1945 MRN: [id number] DATE: 2022    DIAGNOSIS: Annual follow up right total knee      SUBJECTIVE:Patient returns today for  1 year follow up of right total knee replacement. Patient reports doing well with no unusual complaints. Denies any limitations due to the knee.    OBJECTIVE:    Ht 165.1 cm (65\")   Wt 62.1 kg (137 lb)   BMI 22.80 kg/m²   Family History   Problem Relation Age of Onset   • Lung cancer Mother    • Lung cancer Father    • Lung cancer Sister    • Malig Hyperthermia Neg Hx      Past Medical History:   Diagnosis Date   • Arthritis    • Disease of thyroid gland     HYPOTHYROIDISM   • GERD (gastroesophageal reflux disease)    • High cholesterol    • History of bilateral breast implants    • Iron deficiency anemia    • Lumbar stenosis     AND CERVICAL   • Lung cancer (HCC)     RIGHT UPPER LOBE   • Murmur, heart    • Periodic heart flutter     2O PLUS YEARS AGO   • Right knee pain    • Slow to wake up after anesthesia    • Slow to wake up after anesthesia    • Spinal stenosis    • Wears contact lenses      Past Surgical History:   Procedure Laterality Date   • APPENDECTOMY     • BUNIONECTOMY      X 2   • HYSTERECTOMY     • LASIK     • THORACOSCOPY Right 2017    Procedure: BRONCHOSCOPY, RIGHT VAT, ROBOT ASSISTED RIGHT UPPER LOBECTOMY, INTERCOSTAL NERVE BLOCK WITH CRYOA;  Surgeon: Yuri Brizuela III, MD;  Location: [facility];  Service:    • THYROID SURGERY      nodule removed   • TONSILLECTOMY AND ADENOIDECTOMY     • TOTAL KNEE ARTHROPLASTY Right 2021    Procedure: TOTAL KNEE ARTHROPLASTY;  Surgeon: Monroe Vela MD;  Location: [facility];  Service: Orthopedics;  Laterality: Right;     Social History     Socioeconomic History   • Marital status:    Tobacco Use   • Smoking status: Former Smoker     Packs/day: 2.00     Years: 20.00     Pack years: 40.00     Types: Cigarettes     Quit date:      Years since quittin.5   • Smokeless tobacco: " Never Used   • Tobacco comment: QUIT OVER 30 YEARS AGO   Vaping Use   • Vaping Use: Never used   Substance and Sexual Activity   • Alcohol use: Yes     Comment: socially   • Drug use: No   • Sexual activity: Defer     Partners: Female     Birth control/protection: Post-menopausal     Review of Systems: 14 point review of systems performed, all systems negative     Exam:. The incision is well healed. Range of motion is measured at 0 to 120. The calf is soft and nontender with a negative Homans sign. Alignment is neutral. Good quad strength. There is no evidence of varus/valgus or flexion instability. No effusion. Intact to light touch with palpable distal pulses.     DIAGNOSTIC STUDIES  Xrays: 3 views(AP bilateral knees, lateral right, and sunrise bilateral knees) were ordered and reviewed for evaluation of right knee replacement. They demonstrate a well positioned, well aligned knee replacement without complicating factors noted. In comparison with previous films there has been no change.    ASSESSMENT: Annual follow up right knee replacement.    PLAN:  Continue activities as tolerated    Follow up PADMAJA Vela MD  7/14/2022

## 2022-07-15 ENCOUNTER — HOSPITAL ENCOUNTER (OUTPATIENT)
Dept: CARDIOLOGY | Facility: HOSPITAL | Age: 77
Discharge: HOME | End: 2022-07-15
Admitting: INTERNAL MEDICINE

## 2022-07-15 VITALS
HEART RATE: 67 BPM | SYSTOLIC BLOOD PRESSURE: 127 MMHG | WEIGHT: 135 LBS | BODY MASS INDEX: 22.49 KG/M2 | HEIGHT: 65 IN | DIASTOLIC BLOOD PRESSURE: 73 MMHG

## 2022-07-15 DIAGNOSIS — Z13.6 ENCOUNTER FOR SCREENING FOR VASCULAR DISEASE: ICD-10-CM

## 2022-07-15 LAB
BH CV ECHO MEAS - DIST AO DIAM: 1.46 CM
BH CV VAS BP LEFT ARM: NORMAL MMHG
BH CV VAS BP RIGHT ARM: NORMAL MMHG
BH CV XLRA MEAS - MID AO DIAM: 1.84 CM
BH CV XLRA MEAS - PAD LEFT ABI DP: 1.22
BH CV XLRA MEAS - PAD LEFT ABI PT: 1.28
BH CV XLRA MEAS - PAD LEFT ARM: 125 MMHG
BH CV XLRA MEAS - PAD LEFT LEG DP: 153 MMHG
BH CV XLRA MEAS - PAD LEFT LEG PT: 160 MMHG
BH CV XLRA MEAS - PAD RIGHT ABI DP: 1.23
BH CV XLRA MEAS - PAD RIGHT ABI PT: 1.31
BH CV XLRA MEAS - PAD RIGHT ARM: 127 MMHG
BH CV XLRA MEAS - PAD RIGHT LEG DP: 154 MMHG
BH CV XLRA MEAS - PAD RIGHT LEG PT: 164 MMHG
BH CV XLRA MEAS - PROX AO DIAM: 1.82 CM
BH CV XLRA MEAS LEFT ICA/CCA RATIO: 1.1
BH CV XLRA MEAS LEFT MID CCA PSV: NORMAL CM/SEC
BH CV XLRA MEAS LEFT MID ICA PSV: NORMAL CM/SEC
BH CV XLRA MEAS LEFT PROX ECA PSV: NORMAL CM/SEC
BH CV XLRA MEAS RIGHT ICA/CCA RATIO: 1.03
BH CV XLRA MEAS RIGHT MID CCA PSV: NORMAL CM/SEC
BH CV XLRA MEAS RIGHT MID ICA PSV: NORMAL CM/SEC
BH CV XLRA MEAS RIGHT PROX ECA PSV: NORMAL CM/SEC
MAXIMAL PREDICTED HEART RATE: 144 BPM
STRESS TARGET HR: 122 BPM

## 2022-07-15 PROCEDURE — 93799 UNLISTED CV SVC/PROCEDURE: CPT

## 2022-08-08 ENCOUNTER — HOSPITAL ENCOUNTER (OUTPATIENT)
Dept: PET IMAGING | Facility: HOSPITAL | Age: 77
Discharge: HOME | End: 2022-08-08
Admitting: INTERNAL MEDICINE

## 2022-08-08 ENCOUNTER — TRANSCRIBE ORDERS (OUTPATIENT)
Dept: WOMENS IMAGING | Facility: HOSPITAL | Age: 77
End: 2022-08-08

## 2022-08-08 DIAGNOSIS — M81.0 HIGH RISK FOR FRACTURE DUE TO OSTEOPOROSIS BY DEXA SCAN: Primary | ICD-10-CM

## 2022-08-08 PROCEDURE — 77080 DXA BONE DENSITY AXIAL: CPT

## 2022-09-14 ENCOUNTER — HOSPITAL ENCOUNTER (OUTPATIENT)
Dept: CT IMAGING | Facility: HOSPITAL | Age: 77
Discharge: HOME | End: 2022-09-14
Admitting: NURSE PRACTITIONER

## 2022-09-14 DIAGNOSIS — R91.8 PULMONARY NODULES: ICD-10-CM

## 2022-09-14 DIAGNOSIS — C34.11 MALIGNANT NEOPLASM OF UPPER LOBE OF RIGHT LUNG: ICD-10-CM

## 2022-09-14 PROCEDURE — 71250 CT THORAX DX C-: CPT

## 2022-09-21 ENCOUNTER — OFFICE VISIT (OUTPATIENT)
Dept: SURGERY | Facility: CLINIC | Age: 77
End: 2022-09-21

## 2022-09-21 VITALS
OXYGEN SATURATION: 98 % | BODY MASS INDEX: 23.32 KG/M2 | HEIGHT: 65 IN | HEART RATE: 66 BPM | WEIGHT: 140 LBS | DIASTOLIC BLOOD PRESSURE: 74 MMHG | SYSTOLIC BLOOD PRESSURE: 114 MMHG

## 2022-09-21 DIAGNOSIS — Z48.3 AFTERCARE FOLLOWING SURGERY FOR NEOPLASM: ICD-10-CM

## 2022-09-21 DIAGNOSIS — C34.11 MALIGNANT NEOPLASM OF UPPER LOBE OF RIGHT LUNG: Primary | ICD-10-CM

## 2022-09-21 DIAGNOSIS — R91.8 PULMONARY NODULES: ICD-10-CM

## 2022-09-21 PROCEDURE — 99212 OFFICE O/P EST SF 10 MIN: CPT | Performed by: THORACIC SURGERY (CARDIOTHORACIC VASCULAR SURGERY)

## 2022-09-21 NOTE — PROGRESS NOTES
"Chief Complaint  Pulmonary nodules    Subjective        Jerilyn Martinez presents to [facility]AC[facility]ERY  History of Present Illness     Jerilyn Dorado was seen in the office today for further follow-up of a right upper lobectomy performed February 2017 for adenocarcinoma of the lung.  On her last visit routine CT scan showed some pulmonary nodules.  We elected to follow this with a short-term follow-up CT scan.  She has had that scan and is here now for further evaluation.  She has no hemoptysis.  She has no hoarseness or change in her voice.  She has no pleuritic pain.  She is active without significant shortness of breath or wheezing.    Objective   Vital Signs:  /74 (BP Location: Left arm, Patient Position: Sitting, Cuff Size: Adult)   Pulse 66   Ht 163.8 cm (64.5\")   Wt 63.5 kg (140 lb)   SpO2 98%   BMI 23.66 kg/m²   Estimated body mass index is 23.66 kg/m² as calculated from the following:    Height as of this encounter: 163.8 cm (64.5\").    Weight as of this encounter: 63.5 kg (140 lb).    BMI is within normal parameters. No other follow-up for BMI required.      Physical Exam     Neck: No masses and no adenopathy    Chest: No subcutaneous masses or nodules.  Chest wall is stable.    Pulmonary: Lungs are clear to auscultation with equal breath sounds bilaterally    Cardiac: Regular rate and rhythm.  No murmurs or gallops.  No dependent edema.    Result Review :  The following data was reviewed by: Yuri Brizuela III, MD on 09/21/2022:    CT scan of the chest performed September 14, 2022 was independently reviewed and compared to previous scans.  Previously noted 10 mm right lower lobe nodule has completely resolved.  Subpleural micronodules in the right lower lobe are stable.  No new infiltrates nodules or masses.  No suspicious hilar or mediastinal adenopathy.  No pleural effusions.  No bony abnormalities.         Assessment and Plan   Diagnoses and all orders for this " visit:    1. Malignant neoplasm of upper lobe of right lung (HCC) (Primary)  -     CT Chest Without Contrast; Future    2. Pulmonary nodules    3. Aftercare following surgery for neoplasm  -     CT Chest Without Contrast; Future    Patient is doing well.  Nodule has resolved.  Suspect this was a inflammatory process.  No evidence for recurrent or metastatic cancer.  We will continue our follow-up in 1 year with a CT of the chest without contrast.  We will keep you informed of her progress.       I spent 11 minutes caring for Jerilyn on this date of service. This time includes time spent by me in the following activities:preparing for the visit, reviewing tests, performing a medically appropriate examination and/or evaluation , counseling and educating the patient/family/caregiver, ordering medications, tests, or procedures, referring and communicating with other health care professionals , documenting information in the medical record, independently interpreting results and communicating that information with the patient/family/caregiver and care coordination  Follow Up   Return in about 1 year (around 9/21/2023) for Recheck.  Patient was given instructions and counseling regarding her condition or for health maintenance advice. Please see specific information pulled into the AVS if appropriate.

## 2022-10-07 NOTE — DISCHARGE SUMMARY
Patient Care Team:  Felisha Baez MD as PCP - General (Internal Medicine)  Mary Shah MD as Consulting Physician (Gynecology)    Date of Admission:  2/17/2017  Date of Discharge:  2/23/2017    Discharge Diagnosis: Adenocarcinoma of right upper lobe    Presenting Problem  Lung cancer [C34.90]     History of Present Illness  Jerilyn Martinez is a 71 y.o. female who presented with     Hospital Course  Jerilyn Martinez had surgery on day of admission which was uneventful. She was admitted to [facility] for close observation, pain management, and management of chest tubes. POD #1, she was doing well.  F/C and IVF's were discontinued.  Post op am labs were stable. Chest tube drainage was 140 cc for 24 hours. POD #2, she had 300 cc output.  CXR showed small 5 mm apical pneumothorax with small intermittent air leak.  POD #3, continued to improve.  Trial of clamping chest tube.  By the next day, CXR showed increased right pneumothorax after clamping chest tube.  Unclamped and had a rush of air noted in [facility] with a few air bubbles and then no further leak.  Left chest tube to waterseal for another 24 hours.  Attempted to clamp chest tube again on POD #5 due to CXR stable and no air leak present.  Today on POD #6, CXR stable with minimal right apical space contributed to expected postop findings after right upper lobectomy.  Unclamped chest tube.  No air leak.  Chest tube was removed without difficulty.  DuoDerm was applied.  She is ambulating without problems  No problems with bowel or bladder.  Pain controlled and no shortness of breath.  She is ready for discharge home today.                   Procedures Performed  Procedure(s):  BRONCHOSCOPY, RIGHT VAT, ROBOT ASSISTED RIGHT UPPER LOBECTOMY, INTERCOSTAL NERVE BLOCK WITH CRYOA       Consults:   Consults     No orders found from 1/19/2017 to 2/18/2017.          Pertinent Test Results: pathology  Tissue Exam   Order: [id number]   Status:  Final result   Visible to patient:  No  Due to PE  Noted on CTA chest right chest   (Not Released) Dx:  Adenocarcinoma of lung      6d ago     Final Diagnosis   1: RIGHT LUNG, UPPER LOBE, RESECTION SPECIMEN:  ADENOCARCINOMA.  NEGATIVE BRONCHIAL AND VASCULAR MARGINS.  NEGATIVE PERIHILAR LYMPH NODES (3).  NEGATIVE PERIHILAR AND STAPLED PARENCHYMAL MARGIN.     2: LYMPH NODES, R4, BIOPSY (FOUR NODES/CAT FRAGMENTS):  NO TUMOR IDENTIFIED.     3: LYMPH NODE, R11, BIOPSY (ONE NODE):  NO TUMOR IDENTIFIED.     4: LYMPH NODE, R10, BIOPSY (ONE NODE):  NO TUMOR IDENTIFIED.     5: LYMPH NODE, R7, BIOPSY (FOUR NODES/CAT FRAGMENTS):  NO TUMOR IDENTIFIED.     COMMENT: See prior report [id number] for immunophenotype of tumor.        IHC/a/THM  VAIBHAV/jse      CPT CODES:  1: 09083, 88313 x4, 66622, 93985,   2: 07352  3: 72537  4: 22306  5: 02329   Electronically signed by Chris Ji MD on 2/20/2017 at 1303             Condition on Discharge:  Stable.    Vital Signs  Temp:  [96.8 °F (36 °C)-98.2 °F (36.8 °C)] 97.9 °F (36.6 °C)  Heart Rate:  [62-73] 62  Resp:  [18] 18  BP: (105-117)/(59-87) 116/87    Physical Exam:   General Appearance:   Alert, cooperative, in no acute distress    Lungs:   Decreased right upper lobe, respirations regular, even and unlabored    Heart:   Regular rhythm and normal rate, no murmur   Chest Wall:   No abnormalities observed    Abdomen:   Normal bowel sounds, soft, non-tender, non-distended   Extremities:  Moves all extremities well, no edema, no cyanosis       Discharge Disposition  Home today    Discharge Medications   Jerilyn Martinez   Home Medication Instructions DONNA:[id number]    Printed on:02/23/17 [id number]   Medication Information                      aspirin 81 MG EC tablet  Take 81 mg by mouth Daily. PT TO HOLD MED PRIOR TO OR             Calcium Carbonate-Vitamin D 600-200 MG-UNIT tablet  Take 1 tablet by mouth Daily.             cyanocobalamin 100 MCG tablet  Take 100 mcg by mouth Daily.             diphenhydrAMINE-acetaminophen (TYLENOL PM)  MG tablet per  tablet  Take 1 tablet by mouth At Night As Needed for sleep.             estradiol (ESTRACE) 0.5 MG tablet  Take 0.5 mg by mouth Daily.             HYDROcodone-acetaminophen (NORCO) 7.5-325 MG per tablet  Take 1 tablet by mouth Every 6 (Six) Hours As Needed for moderate pain (4-6) for up to 4 days.             levothyroxine (SYNTHROID, LEVOTHROID) 75 MCG tablet  Take 75 mcg by mouth Daily.             meloxicam (MOBIC) 15 MG tablet  Take 15 mg by mouth Daily As Needed.             Multiple Vitamin (MULTIVITAMIN) capsule  Take 1 capsule by mouth Daily.             Multiple Vitamins-Minerals (EMERGEN-C VITAMIN C) pack  Take 1 package by mouth Daily As Needed.             Omega-3 Fatty Acids (FISH OIL) 1200 MG capsule capsule  Take 1,200 mg by mouth Daily With Breakfast. PT TO HOLD MED PRIOR TO OR             omeprazole (priLOSEC) 20 MG capsule  Take 20 mg by mouth Daily.             simvastatin (ZOCOR) 40 MG tablet  Take 20 mg by mouth Every Night.                 Discharge Instructions:  · No heavy lifting, pushing, pulling greater than 10 pounds.  · No driving up until 2 weeks after surgery and no longer taking narcotics.  · Resume home diet as tolerated.  · May shower on day of discharge. Remove dressing from post chest tube site in 48 hours, then clean with antibacterial soap, and apply gauze dressing or band-aid as needed for any drainage.  No dressing needed once no longer draining.          Follow-up Appointments  Future Appointments  Date Time Provider Department Center   3/8/2017 9:00 AM Yuri Brizuela III, MD [facility] None     Additional Instructions for the Follow-ups that You Need to Schedule     Discharge Follow-Up With Specified Provider    As directed    Follow-up appointment scheduled for 3/8/17 with repeat CXR first at [facility] at 8:30am.   Follow Up:  2 Weeks           Additional information on Labs and Follow-ups:      You have a scheduled one week follow up with your primary care doctor  Dr. Felisha Baez on Thursday March 2nd, 2017 at 11:05 am. If you need to change the appointment day or time for any reason give their office a call as soon as possible.                     For any questions regarding patient's stay, please refer to patient's chart.    Susana Wallace, APRN  [facility]  02/23/17  2:23 PM

## 2023-01-13 ENCOUNTER — APPOINTMENT (OUTPATIENT)
Dept: WOMENS IMAGING | Facility: HOSPITAL | Age: 78
End: 2023-01-13
Payer: MEDICARE

## 2023-01-13 PROCEDURE — 77067 SCR MAMMO BI INCL CAD: CPT | Performed by: RADIOLOGY

## 2023-01-13 PROCEDURE — 77063 BREAST TOMOSYNTHESIS BI: CPT | Performed by: RADIOLOGY

## 2023-09-13 ENCOUNTER — HOSPITAL ENCOUNTER (OUTPATIENT)
Dept: CT IMAGING | Facility: HOSPITAL | Age: 78
Discharge: HOME | End: 2023-09-13
Admitting: THORACIC SURGERY (CARDIOTHORACIC VASCULAR SURGERY)
Payer: MEDICARE

## 2023-09-13 ENCOUNTER — HOSPITAL ENCOUNTER (OUTPATIENT)
Dept: CT IMAGING | Facility: HOSPITAL | Age: 78
End: 2023-09-13
Payer: MEDICARE

## 2023-09-13 DIAGNOSIS — Z48.3 AFTERCARE FOLLOWING SURGERY FOR NEOPLASM: ICD-10-CM

## 2023-09-13 DIAGNOSIS — C34.11 MALIGNANT NEOPLASM OF UPPER LOBE OF RIGHT LUNG: ICD-10-CM

## 2023-09-13 PROCEDURE — 71250 CT THORAX DX C-: CPT

## 2023-09-20 ENCOUNTER — OFFICE VISIT (OUTPATIENT)
Dept: SURGERY | Facility: CLINIC | Age: 78
End: 2023-09-20
Payer: MEDICARE

## 2023-09-20 VITALS
SYSTOLIC BLOOD PRESSURE: 138 MMHG | HEIGHT: 65 IN | DIASTOLIC BLOOD PRESSURE: 74 MMHG | BODY MASS INDEX: 22.66 KG/M2 | OXYGEN SATURATION: 92 % | HEART RATE: 95 BPM | WEIGHT: 136 LBS

## 2023-09-20 DIAGNOSIS — C34.11 MALIGNANT NEOPLASM OF UPPER LOBE OF RIGHT LUNG: Primary | ICD-10-CM

## 2023-09-20 DIAGNOSIS — Z48.3 AFTERCARE FOLLOWING SURGERY FOR NEOPLASM: ICD-10-CM

## 2023-09-20 DIAGNOSIS — R91.8 PULMONARY NODULES: ICD-10-CM

## 2023-09-20 NOTE — PROGRESS NOTES
"Chief Complaint  No chief complaint on file.    Subjective        Jerilyn Martinez presents to [facility] THORACIC SURGERY  History of Present Illness  Ms. Martinez is a pleasant 78-year-old lady who presents today for continued postoperative surveillance after undergoing a right upper lobectomy in February 2017 for adenocarcinoma of the lung.  She denies any current symptoms.  She denies any fevers, chills and/or cough.  She denies any hemoptysis, change in her voice and/or function.  She denies any pleuritic chest pain.  Overall she is doing well.    Objective   Vital Signs:  /74 (BP Location: Left arm, Patient Position: Sitting, Cuff Size: Adult)   Pulse 95   Ht 163.8 cm (64.5\")   Wt 61.7 kg (136 lb)   SpO2 92%   BMI 22.98 kg/m²   Estimated body mass index is 22.98 kg/m² as calculated from the following:    Height as of this encounter: 163.8 cm (64.5\").    Weight as of this encounter: 61.7 kg (136 lb).       BMI is within normal parameters. No other follow-up for BMI required.      Physical Exam  Constitutional:       Appearance: Normal appearance.   Cardiovascular:      Rate and Rhythm: Normal rate and regular rhythm.      Pulses: Normal pulses.      Heart sounds: Normal heart sounds.   Pulmonary:      Effort: Pulmonary effort is normal.      Breath sounds: Normal breath sounds.   Neurological:      Mental Status: She is alert.      Result Review :  CT scan from 9/13/2023 was visualized by myself.  She has anticipated postoperative changes.  And no significant change from years prior CT scan.           Assessment and Plan   Diagnoses and all orders for this visit:    1. Malignant neoplasm of upper lobe of right lung (Primary)  -     CT Chest Without Contrast; Future    2. Aftercare following surgery for neoplasm  -     CT Chest Without Contrast; Future    3. Pulmonary nodules  -     CT Chest Without Contrast; Future    Ms. Martinez is a pleasant 78-year-old lady who is 5 years status " post right upper lobectomy for adenocarcinoma of the right upper lobe.  She presents today for 1 year continued surveillance noncontrasted CT her scan appears stable from 1 year prior.  I did not appreciate any new concerning signs of recurrent or new disease.  She denies any current symptoms.  We can see her back in 1 years time with a continued postoperative surveillance imaging       I spent 40 minutes caring for Jerilyn on this date of service. This time includes time spent by me in the following activities:preparing for the visit, reviewing tests, obtaining and/or reviewing a separately obtained history, performing a medically appropriate examination and/or evaluation , counseling and educating the patient/family/caregiver, ordering medications, tests, or procedures, referring and communicating with other health care professionals , documenting information in the medical record, independently interpreting results and communicating that information with the patient/family/caregiver, and care coordination  Follow Up   No follow-ups on file.  Patient was given instructions and counseling regarding her condition or for health maintenance advice. Please see specific information pulled into the AVS if appropriate.

## 2024-01-17 ENCOUNTER — APPOINTMENT (OUTPATIENT)
Dept: WOMENS IMAGING | Facility: HOSPITAL | Age: 79
End: 2024-01-17
Payer: MEDICARE

## 2024-01-17 PROCEDURE — 77067 SCR MAMMO BI INCL CAD: CPT | Performed by: RADIOLOGY

## 2024-01-17 PROCEDURE — 77063 BREAST TOMOSYNTHESIS BI: CPT | Performed by: RADIOLOGY

## 2024-01-24 ENCOUNTER — TELEPHONE (OUTPATIENT)
Dept: ORTHOPEDIC SURGERY | Facility: CLINIC | Age: 79
End: 2024-01-24
Payer: MEDICARE

## 2024-01-24 NOTE — TELEPHONE ENCOUNTER
Spoke to patient, informed patient protocol is 2 years from surgery. Patient states understanding

## 2024-01-24 NOTE — TELEPHONE ENCOUNTER
Caller: Jerilyn Martinez    Relationship: Self    Best call back number:     Who are you requesting to speak with (clinical staff, provider,  specific staff member): CLINICAL    What was the call regarding: JERILYN WOULD LIKE TO KNOW HOW LONG SHE WILL HAVE TO TAKE ANTIBIOTICS BEFORE DENTAL CLEANINGS/DENTAL WORK    Is it okay if the provider responds through MyChart: CALL

## 2024-02-05 ENCOUNTER — OFFICE VISIT (OUTPATIENT)
Dept: ORTHOPEDIC SURGERY | Facility: CLINIC | Age: 79
End: 2024-02-05
Payer: MEDICARE

## 2024-02-05 VITALS — BODY MASS INDEX: 23.9 KG/M2 | HEIGHT: 64 IN | WEIGHT: 140 LBS | TEMPERATURE: 97.8 F

## 2024-02-05 DIAGNOSIS — R52 PAIN: Primary | ICD-10-CM

## 2024-02-05 DIAGNOSIS — S89.92XA KNEE INJURY, LEFT, INITIAL ENCOUNTER: ICD-10-CM

## 2024-02-05 PROCEDURE — 1160F RVW MEDS BY RX/DR IN RCRD: CPT | Performed by: NURSE PRACTITIONER

## 2024-02-05 PROCEDURE — 1159F MED LIST DOCD IN RCRD: CPT | Performed by: NURSE PRACTITIONER

## 2024-02-05 PROCEDURE — 73562 X-RAY EXAM OF KNEE 3: CPT | Performed by: NURSE PRACTITIONER

## 2024-02-05 PROCEDURE — 99214 OFFICE O/P EST MOD 30 MIN: CPT | Performed by: NURSE PRACTITIONER

## 2024-02-05 NOTE — PROGRESS NOTES
Patient: Jerilyn Martinez  YOB: 1945 78 y.o. female  Medical Record Number: [id number]    Chief Complaints:   Chief Complaint   Patient presents with    Left Knee - Initial Evaluation       History of Present Illness:Jerilyn Martinez is a 78 y.o. female who presents with complaints of left knee pain and instability.  Patient reports she sustained a fall about 4 months ago have been doing okay but starting about 5 to 6 weeks ago anytime she would kneel down she had acute onset of lateral knee pain, a stabbing type pain which would last for several hours after and feelings of instability.  Patient reports she is really never had anything like this before.    Allergies:   Allergies   Allergen Reactions    Codeine Shortness Of Breath     CHEST PAIN;  Patient has taken Lortab/Norco in the past with no problem          Medications:   Current Outpatient Medications   Medication Sig Dispense Refill    cephalexin (Keflex) 500 MG capsule Take all 4 caps 1 hour prior to procedure 4 capsule 5    levothyroxine (SYNTHROID, LEVOTHROID) 75 MCG tablet Take 1 tablet by mouth Daily.      omeprazole (priLOSEC) 20 MG capsule Take 1 capsule by mouth Daily.      simvastatin (ZOCOR) 20 MG tablet 1 tablet Every Night.       No current facility-administered medications for this visit.     Facility-Administered Medications Ordered in Other Visits   Medication Dose Route Frequency Provider Last Rate Last Admin    Chlorhexidine Gluconate Cloth 2 % pads   Apply externally BID Cass Burgos APRN             The following portions of the patient's history were reviewed and updated as appropriate: allergies, current medications, past family history, past medical history, past social history, past surgical history and problem list.    Review of Systems:   14 point review of systems was performed. All systems negative except pertinent positives/negatives listed in HPI above    Physical Exam:   Vitals:    02/05/24 0812   Temp: 97.8  "°F (36.6 °C)   TempSrc: Temporal   Weight: 63.5 kg (140 lb)   Height: 163.8 cm (64.49\")   PainSc: 0-No pain   PainLoc: Knee       General: A and O x 3, ASA, NAD    Skin clear no unusual lesions noted  Left knee patient does have trace amount of effusion noted with 116 degrees flexion neutral in extension with a positive Zaira negative Lockman calf soft and nontender      Radiology:  Xrays 3views (ap,lateral, sunrise) left knee were ordered and reviewed today secondary to pain show mild arthritic changes.  No comparative views available    Assessment/Plan: Left knee injury with subsequent pain and mechanical symptoms    Patient and I discussed options, we will proceed with an MRI to further evaluate for meniscus tear, once we get those results back we will contact patient regarding treatment options      Cass Burgos, ADELITA  2/5/2024  Answers submitted by the patient for this visit:  Other (Submitted on 2/4/2024)  Please describe your symptoms.: Knee pain  Have you had these symptoms before?: No  How long have you been having these symptoms?: Greater than 2 weeks  Primary Reason for Visit (Submitted on 2/4/2024)  What is the primary reason for your visit?: Other    "

## 2024-02-21 ENCOUNTER — HOSPITAL ENCOUNTER (OUTPATIENT)
Dept: MRI IMAGING | Facility: HOSPITAL | Age: 79
Discharge: HOME | End: 2024-02-21
Admitting: NURSE PRACTITIONER
Payer: MEDICARE

## 2024-02-21 DIAGNOSIS — S89.92XA KNEE INJURY, LEFT, INITIAL ENCOUNTER: ICD-10-CM

## 2024-02-21 PROCEDURE — 73721 MRI JNT OF LWR EXTRE W/O DYE: CPT

## 2024-02-22 ENCOUNTER — TELEPHONE (OUTPATIENT)
Dept: ORTHOPEDIC SURGERY | Facility: CLINIC | Age: 79
End: 2024-02-22
Payer: MEDICARE

## 2024-02-22 NOTE — TELEPHONE ENCOUNTER
"Left voicemail for patient to return call regarding MRI results and plan of care.       Relay     \"Please let patient know that the MRI does show an old collateral ligament sprain as well as a small meniscus tear. Would recommend that she come into the office to see me we can further discuss the MRI and options. I will be happy to see her tomorrow afternoon, I do have openings  \"                  "

## 2024-02-22 NOTE — TELEPHONE ENCOUNTER
Pt called back and was informed of her results; appt was scheduled for her to see MLL tomorrow @ 3:00.

## 2024-02-22 NOTE — TELEPHONE ENCOUNTER
----- Message from ADELITA Martins sent at 2/22/2024 10:51 AM EST -----  Please let patient know that the MRI does show an old collateral ligament sprain as well as a small meniscus tear.  Would recommend that she come into the office to see me we can further discuss the MRI and options.  I will be happy to see her tomorrow afternoon, I do have openings

## 2024-02-26 ENCOUNTER — TELEPHONE (OUTPATIENT)
Dept: ORTHOPEDIC SURGERY | Facility: CLINIC | Age: 79
End: 2024-02-26
Payer: MEDICARE

## 2024-02-27 ENCOUNTER — OFFICE VISIT (OUTPATIENT)
Dept: ORTHOPEDIC SURGERY | Facility: CLINIC | Age: 79
End: 2024-02-27
Payer: MEDICARE

## 2024-02-27 VITALS — HEIGHT: 64 IN | BODY MASS INDEX: 23.9 KG/M2 | TEMPERATURE: 97.5 F | WEIGHT: 140 LBS

## 2024-02-27 DIAGNOSIS — M25.562 CHRONIC PAIN OF LEFT KNEE: Primary | ICD-10-CM

## 2024-02-27 DIAGNOSIS — G89.29 CHRONIC PAIN OF LEFT KNEE: Primary | ICD-10-CM

## 2024-02-27 DIAGNOSIS — M25.562 ACUTE PAIN OF LEFT KNEE: Primary | ICD-10-CM

## 2024-02-27 PROCEDURE — 1159F MED LIST DOCD IN RCRD: CPT | Performed by: NURSE PRACTITIONER

## 2024-02-27 PROCEDURE — 1160F RVW MEDS BY RX/DR IN RCRD: CPT | Performed by: NURSE PRACTITIONER

## 2024-02-27 PROCEDURE — 99213 OFFICE O/P EST LOW 20 MIN: CPT | Performed by: NURSE PRACTITIONER

## 2024-02-27 RX ORDER — MELOXICAM 7.5 MG/1
7.5 TABLET ORAL DAILY
Qty: 30 TABLET | Refills: 3 | Status: SHIPPED | OUTPATIENT
Start: 2024-02-27

## 2024-02-27 NOTE — PROGRESS NOTES
Patient: Jerilyn Martinez  YOB: 1945 78 y.o. female  Medical Record Number: [id number]    Chief Complaints:   Chief Complaint   Patient presents with    Left Knee - Follow-up       History of Present Illness:Jerilyn Martinez is a 78 y.o. female who presents for follow-up for left knee pain, patient had MRI which did show an aging collateral ligament injury as well as a small lateral meniscus tear.  Patient reports she is really not having much pain with walking or instability her major complaint is increased pain with kneeling that is when she gets that sharp lateral knee pain.  Patient has tried ibuprofen with minimal improvement    Allergies:   Allergies   Allergen Reactions    Codeine Shortness Of Breath     CHEST PAIN;  Patient has taken Lortab/Norco in the past with no problem          Medications:   Current Outpatient Medications   Medication Sig Dispense Refill    cephalexin (Keflex) 500 MG capsule Take all 4 caps 1 hour prior to procedure 4 capsule 5    levothyroxine (SYNTHROID, LEVOTHROID) 75 MCG tablet Take 1 tablet by mouth Daily.      omeprazole (priLOSEC) 20 MG capsule Take 1 capsule by mouth Daily.      simvastatin (ZOCOR) 20 MG tablet 1 tablet Every Night.       No current facility-administered medications for this visit.     Facility-Administered Medications Ordered in Other Visits   Medication Dose Route Frequency Provider Last Rate Last Admin    Chlorhexidine Gluconate Cloth 2 % pads   Apply externally BID Cass Burgos APRN             The following portions of the patient's history were reviewed and updated as appropriate: allergies, current medications, past family history, past medical history, past social history, past surgical history and problem list.    Review of Systems:   14 point review of systems was performed. All systems negative except pertinent positives/negatives listed in HPI above    Physical Exam:   Vitals:    02/27/24 0759   Temp: 97.5 °F (36.4 °C)  "  TempSrc: Temporal   Weight: 63.5 kg (140 lb)   Height: 163.8 cm (64.49\")   PainSc: 0-No pain   PainLoc: Knee       General: A and O x 3, ASA, NAD   Skin clear no unusual lesions noted  Left knee the patient is tender palpation over the collateral ligament I do not appreciate any gross laxity at this time, she has trace amount of knee effusion diffusely with 120 degrees flexion neutral in extension       Radiology:  Xrays 3views (ap,lateral, sunrise) left knee were reviewed as well as MRI and findings are above    Assessment/Plan: Left knee pain secondary to previous injury    Patient and I discussed options, we will change from ibuprofen to meloxicam for her to take daily for the next 2 weeks then as needed, physical therapy, and I will see her back for follow-up if her pain does not improve      Cass Burgos, APRN  2/27/2024  "

## 2024-03-06 ENCOUNTER — TREATMENT (OUTPATIENT)
Dept: PHYSICAL THERAPY | Facility: CLINIC | Age: 79
End: 2024-03-06
Payer: MEDICARE

## 2024-03-06 DIAGNOSIS — G89.29 CHRONIC PAIN OF LEFT KNEE: Primary | ICD-10-CM

## 2024-03-06 DIAGNOSIS — M25.562 CHRONIC PAIN OF LEFT KNEE: Primary | ICD-10-CM

## 2024-03-06 DIAGNOSIS — Z74.09 IMPAIRED FUNCTIONAL MOBILITY, BALANCE, GAIT, AND ENDURANCE: ICD-10-CM

## 2024-03-06 PROCEDURE — 97162 PT EVAL MOD COMPLEX 30 MIN: CPT | Performed by: PHYSICAL THERAPIST

## 2024-03-06 PROCEDURE — 97140 MANUAL THERAPY 1/> REGIONS: CPT | Performed by: PHYSICAL THERAPIST

## 2024-03-06 PROCEDURE — 97110 THERAPEUTIC EXERCISES: CPT | Performed by: PHYSICAL THERAPIST

## 2024-03-06 NOTE — PROGRESS NOTES
Physical Therapy Initial Evaluation and Plan of Care  [facility] Physical Therapy [facility]  [address], [address]  [place] [address]    Patient: Jerilyn Martinez   : 1945  Diagnosis/ICD-10 Code:  Chronic pain of left knee [M25.562, G89.29]  Referring practitioner: ADELITA Martins  Today's Date: 3/6/2024    Subjective Evaluation    History of Present Illness  Mechanism of injury: Patient states that she fell last October - skinned her left knee but no significant pain  About 6 weeks ago she tried to kneel down and felt sharp stabbing pain in the lateral aspect of the knee  Saw Meena Burgos - ordered MRI which revealed Grade 2-3 sprain on the MCL and lateral meniscal tear.  Indicates that the MCL sprain was chronic - no mention of chronicity with respect to the meniscal tear  Referred to PT and started her on Meloxicam  Has history of right TKR 2021 - good outcome after surgery  Walks 2-5 miles/day        Patient Occupation: works at [employer] 2 days/wk Pain  Current pain ratin  At best pain ratin  At worst pain rating: 10  Location: When kneeling - sharp stabbing deep lateral knee pain, slowly subsides, no popping  Quality: knife-like, sharp and pressure  Relieving factors: rest  Aggravating factors: squatting (pressure to lateral knee while rolling over in bed, kneeling)  Progression: no change    Social Support  Lives in: one-story house    Diagnostic Tests  X-ray: abnormal (DJD)  MRI studies: abnormal    Treatments  Previous treatment: medication  Current treatment: medication and physical therapy  Patient Goals  Patient goal: pain to go away so she can kneel iwhtout pain           Objective          Observations     Additional Knee Observation Details  Walks with a symmetrical gait pattern  Slight genu valgus, minimal/no swelling noted    Palpation   Left   Tenderness of the distal semimembranosus and distal semitendinosus.     Tenderness   Left Knee    Tenderness in the lateral joint line, MCL (distal) and medial joint line. No tenderness in the quadriceps tendon.     Neurological Testing     Sensation     Knee   Left Knee   Intact: Light touch    Active Range of Motion   Left Knee   Flexion: 140 degrees   Extension: 0 degrees     Patellar Static Positioning   Left Knee: WFL    Strength/Myotome Testing     Left Knee   Flexion: 4  Extension: 4+  Quadriceps contraction: good    Tests     Left Knee   Negative anterior Lachman, lateral Zaira, medial Zaira, posterior drawer, valgus stress test at 0 degrees and varus stress test at 0 degrees.     Additional Tests Details  Kneeling causes pain in the lateral knee, but no pain with squatting    Left Knee Flexibility Comments:   Decreased ITB and hip rotator flexibility          Assessment & Plan       Assessment  Impairments: abnormal gait, abnormal muscle firing, abnormal or restricted ROM, activity intolerance, lacks appropriate home exercise program and pain with function   Functional limitations: pulling and pushing   Assessment details: 78 y.o. female with left lateral knee pain when she kneels presents with: 1. Intermittent left lateral knee pain, 2. Full knee AROM, 3. Decreased flexibility left hip mm, 4. Decreased strength left LE, 5. History of right TKR, 6. Decreased tolerance for WB activities,   Prognosis: good    Goals  Plan Goals: Short Term Goals:3 weeks  Patient will be able to tolerate initial exercises  Patient will have pain <5/10  Patient will be able to kneel with 50% less pain  Patient will be able to walk her dog for 30 minutes without pain    Long Term Goals: 6 weeks   Patient will be independent in performing home exercise program.  Patient will have functional pain free knee AROM  Patient will be able to kneel without pain  Patient will be able to walk for >30 minutes without increased symptoms           Plan  Planned modality interventions: cryotherapy  Planned therapy interventions:  manual therapy, strengthening, stretching, therapeutic activities, joint mobilization, home exercise program, soft tissue mobilization and neuromuscular re-education  Frequency: 2x week  Duration in visits: 12  Duration in weeks: 6  Treatment plan discussed with: patient  Plan details: Access Code: [email or web address]  URL: [email or web address]  Date: 03/06/2024  Prepared by: Eliza Desouza    Exercises  - Supine Piriformis Stretch with Foot on Ground  - 2 x daily - 7 x weekly - 1 sets - 10 reps - 20 sec hold  - Supine Piriformis Stretch  - 2 x daily - 7 x weekly - 1 sets - 10 reps - 20 sec hold  - Small Range Straight Leg Raise  - 1 x daily - 7 x weekly - 1 sets - 10 reps - 3 secomds hold  - Sidelying Hip Abduction  - 1 x daily - 7 x weekly - 1 sets - 10 reps - 3 seconds hold  - Sidelying Hip Adduction  - 1 x daily - 7 x weekly - 1 sets - 10 reps - 3 seconds  hold  - Hip Flexor Stretch on Step  - 2 x daily - 7 x weekly - 1 sets - 3 reps - 20 sec hold  - Standing Hamstring Stretch with Step  - 2 x daily - 7 x weekly - 1 sets - 3 reps - 20 sec hold        Manual Therapy:    10     mins  20165;  Therapeutic Exercise:    20     mins  17634;     Neuromuscular Veena:    0    mins  71512;    Therapeutic Activity:     0     mins  55093;     Ultrasound                  __0_  mins  08920  Iontophoresis                 0    mins  50075    Electrical Stimulation     0    mins  13179 ([id number])  Traction                         _0  mins  68321     Evaluation Time:     30  mins  Timed Treatment:   30   mins   Total Treatment:     65   mins    PT: Eliza Desouza PT     License Number: [place] [id number]  Electronically signed by Eliza Desouza PT, 03/06/24, 1:54 PM EST    Certification Period: 3/6/2024 thru 6/3/2024  I certify that the therapy services are furnished while this patient is under my care.  The services outlined above are required by this patient, and will be reviewed every 90 days.         Physician  Signature:__________________________________________________    PHYSICIAN: Cass Burgos APRN      DATE:     Please sign and return via fax to [email or web address] . Thank you, [facility].    PT SIGNATURE: Eliza Desouza, PT   [place] LICENSE:  [id number]

## 2024-03-08 ENCOUNTER — TREATMENT (OUTPATIENT)
Dept: PHYSICAL THERAPY | Facility: CLINIC | Age: 79
End: 2024-03-08
Payer: MEDICARE

## 2024-03-08 DIAGNOSIS — M25.562 CHRONIC PAIN OF LEFT KNEE: Primary | ICD-10-CM

## 2024-03-08 DIAGNOSIS — Z74.09 IMPAIRED FUNCTIONAL MOBILITY, BALANCE, GAIT, AND ENDURANCE: ICD-10-CM

## 2024-03-08 DIAGNOSIS — G89.29 CHRONIC PAIN OF LEFT KNEE: Primary | ICD-10-CM

## 2024-03-08 PROCEDURE — 97140 MANUAL THERAPY 1/> REGIONS: CPT | Performed by: PHYSICAL THERAPIST

## 2024-03-08 PROCEDURE — 97110 THERAPEUTIC EXERCISES: CPT | Performed by: PHYSICAL THERAPIST

## 2024-03-08 NOTE — PROGRESS NOTES
Physical Therapy Daily Treatment Note    Visit Diagnoses:    ICD-10-CM ICD-9-CM   1. Chronic pain of left knee  M25.562 719.46    G89.29 338.29   2. Impaired functional mobility, balance, gait, and endurance  Z74.09 V49.89       VISIT#: 2      Jerilyn Martinez reports: She fell in October and that is what she thinks started her pain. She was walking her dog. It only hurts when she kneels.   Current Pain Level:    0/10; Worst:   9/10 when kneeling; Best:  0/10  Affected Area: L Grade 2-3 sprain on the MCL and lateral meniscal tear. Indicates that the MCL sprain was chronic   Quality of Pain: knife-like, sharp and pressure   Functional Deficits/Irritating Factors: squatting (pressure to lateral knee while rolling over in bed, kneeling)   Progression: no changes yet  Compliance with HEP Reported: yes    Objective    Added to Program: KT tape, hip extension        See Exercise, Manual, and Modality Logs for complete treatment.     Patient Education: Pt was educated on exercise biomechanical correctness, intensity, and speed.     Assessment:  Jerilyn demonstrated good motor control and technique with her exercises. No pain today but it occurs only with kneeling. She was less tight and tender in her IT band today. Added some KT tape to lateral knee to try and take some pressure off of the area. She was tender along the lateral patella.  Pt will continue to benefit from skilled PT interventions to address current functional deficits and impairments.       Plan: Progress to/Continue with current program.       Timed:  Manual Therapy:            15_    mins  11673;  Ultrasound:                     0      mins  87986;   Therapeutic Exercise:    30     mins  88538;     Neuromuscular Veena:   0     mins  53990;    Therapeutic Activity:      0     mins  94807;      Iontophoresis              _0__   mins  Dry Needling               _0____   mins         Untimed:  Work Conditioning: __0__ mins 80869  Electrical Stimulation:    0     mins  72590 ( );  Mechanical Traction:       0        mins  70317;   Paraffin                       __0___  mins   Ice/Heat: __0__ mins      Timed Treatment:   45   mins   Total Treatment:     45   mins          VITALY Magaña License # [id number]  Physical Therapist Assistant

## 2024-03-11 ENCOUNTER — TREATMENT (OUTPATIENT)
Dept: PHYSICAL THERAPY | Facility: CLINIC | Age: 79
End: 2024-03-11
Payer: MEDICARE

## 2024-03-11 DIAGNOSIS — M25.562 CHRONIC PAIN OF LEFT KNEE: Primary | ICD-10-CM

## 2024-03-11 DIAGNOSIS — Z74.09 IMPAIRED FUNCTIONAL MOBILITY, BALANCE, GAIT, AND ENDURANCE: ICD-10-CM

## 2024-03-11 DIAGNOSIS — G89.29 CHRONIC PAIN OF LEFT KNEE: Primary | ICD-10-CM

## 2024-03-11 PROCEDURE — 97110 THERAPEUTIC EXERCISES: CPT | Performed by: PHYSICAL THERAPIST

## 2024-03-11 NOTE — PROGRESS NOTES
Physical Therapy Daily Treatment Note    Visit Diagnoses:    ICD-10-CM ICD-9-CM   1. Chronic pain of left knee  M25.562 719.46    G89.29 338.29   2. Impaired functional mobility, balance, gait, and endurance  Z74.09 V49.89       VISIT#: 3      Jerilyn Martinez reports: She quit exercising because she was making her R knee sore. She thinks she was doing something wrong and not sure what stretch whe was doing that bothered her R knee. The KT tape made her L6 knee feel stronger.   Current Pain Level:    0/10; Worst:   9/10 when kneeling; Best:  0/10  Affected Area: L Grade 2-3 sprain on the MCL and lateral meniscal tear. Indicates that the MCL sprain was chronic   Quality of Pain: knife-like, sharp and pressure   Functional Deficits/Irritating Factors: squatting (pressure to lateral knee while rolling over in bed, kneeling)     Compliance with HEP Reported: yes    Objective    Mild swollen spot on lateral and  distal to knee cap  a little larger than the size of Half dollar      See Exercise, Manual, and Modality Logs for complete treatment.     Patient Education: Pt was educated on exercise biomechanical correctness, intensity, and speed.     Assessment:  Jerilyn was having increased soreness in here R knee from one of her exercises on the L knee. It was determined it was probably from SL hip adduction because her R knee was in an awkward position. She demonstrated good motor control with her exercises otherwise. She found the KT tape helpful so applied again today. Pt will continue to benefit from skilled PT interventions to address current functional deficits and impairments.       Plan: Progress to/Continue with current program.       Timed:  Manual Therapy:            6_    mins  29435;  Ultrasound:                     0      mins  24617;   Therapeutic Exercise:    40     mins  69075;     Neuromuscular Veena:   0     mins  42263;    Therapeutic Activity:      0     mins  73749;      Iontophoresis              _0__    mins  Dry Needling               _0____   mins         Untimed:  Work Conditioning: __0__ mins 66183  Electrical Stimulation:    0    mins  14613 ( );  Mechanical Traction:       0        mins  98747;   Paraffin                       __0___  mins   Ice/Heat: __0__ mins      Timed Treatment:   46   mins   Total Treatment:     46   mins          VITALY Magaña License # [id number]  Physical Therapist Assistant

## 2024-03-12 ENCOUNTER — TRANSCRIBE ORDERS (OUTPATIENT)
Dept: ADMINISTRATIVE | Facility: HOSPITAL | Age: 79
End: 2024-03-12
Payer: MEDICARE

## 2024-03-12 DIAGNOSIS — E04.1 THYROID NODULE: Primary | ICD-10-CM

## 2024-03-15 ENCOUNTER — TREATMENT (OUTPATIENT)
Dept: PHYSICAL THERAPY | Facility: CLINIC | Age: 79
End: 2024-03-15
Payer: MEDICARE

## 2024-03-15 DIAGNOSIS — Z74.09 IMPAIRED FUNCTIONAL MOBILITY, BALANCE, GAIT, AND ENDURANCE: ICD-10-CM

## 2024-03-15 DIAGNOSIS — G89.29 CHRONIC PAIN OF LEFT KNEE: Primary | ICD-10-CM

## 2024-03-15 DIAGNOSIS — M25.562 CHRONIC PAIN OF LEFT KNEE: Primary | ICD-10-CM

## 2024-03-15 PROCEDURE — 97530 THERAPEUTIC ACTIVITIES: CPT | Performed by: PHYSICAL THERAPIST

## 2024-03-15 PROCEDURE — 97110 THERAPEUTIC EXERCISES: CPT | Performed by: PHYSICAL THERAPIST

## 2024-03-15 PROCEDURE — 97035 APP MDLTY 1+ULTRASOUND EA 15: CPT | Performed by: PHYSICAL THERAPIST

## 2024-03-15 NOTE — PROGRESS NOTES
Physical Therapy Daily Treatment Note  [facility] Therapy [facility]  [address], [address]  [place] [address]    Patient: Jerilyn Martinez   : 1945  Referring practitioner: ADELITA Martins  Today's Date: 3/15/2024  Patient seen for 4 sessions    Visit Diagnoses:    ICD-10-CM ICD-9-CM   1. Chronic pain of left knee  M25.562 719.46    G89.29 338.29   2. Impaired functional mobility, balance, gait, and endurance  Z74.09 V49.89       Subjective   Jerilyn Martinez reports: that she still has no pain unless she kneels.  States that her right leg has been sore but no sharp pain.  No instability in either knee.        Objective   Tenderness lateral joint line    Walks with symmetrical gait pattern    See Exercise, Manual, and Modality Logs for complete treatment.     Patient Education:purpose of ultrasound    Assessment/Plan  Jerilyn still has tenderness in lateral joint line but has no pain unless she kneels on the knee.  Attempted partial WB kneeling today but was still painful. Altered Kinesiotape application technique today with stronger lift on lateral knee to see if it would assist in pain control    Progress strengthening /stabilization /functional activity           Timed:  Manual Therapy:    5     mins  22716;  Therapeutic Exercise:    15/25     mins  00629;     Neuromuscular Veena:    0    mins  55168;    Therapeutic Activity:     10     mins  71186;     Ultrasound:      8     mins  77184;    Iontophoresis              __0_   mins  Dry Needling               _____   mins        Untimed:  Electrical Stimulation:     0    mins  06011 ( );  Mechanical Traction:             mins  92023;   Paraffin                       _____  mins     Timed Treatment:   38   mins   Total Treatment:     60   mins    Eliza Desouza PT  [place] License # [id number]  Physical Therapist    Electronically signed by Eliza Desouza PT, 03/15/24, 1:54 PM EDT

## 2024-03-19 NOTE — PROGRESS NOTES
Physical Therapy Daily Treatment Note    Visit Diagnoses:    ICD-10-CM ICD-9-CM   1. Chronic pain of left knee  M25.562 719.46    G89.29 338.29   2. Impaired functional mobility, balance, gait, and endurance  Z74.09 V49.89       VISIT#: 5      Jerilyn Martinez reports: She tried to knee in [facility] with the tape on but the more weight she tried to put on her knee, the more it hurt. Her knee still only hurts when she kneels.   Current Pain Level:    0/10; Worst:   9/10 when kneeling; Best:  0/10  Affected Area: L Grade 2-3 sprain on the MCL and lateral meniscal tear. Indicates that the MCL sprain was chronic   Quality of Pain: knife-like, sharp and pressure   Functional Deficits/Irritating Factors: squatting (pressure to lateral knee while rolling over in bed, kneeling)     Objective      Added to Program: Prone quad stretch and HL hip flexor stretch        See Exercise, Manual, and Modality Logs for complete treatment.     Patient Education: Pt was educated on exercise biomechanical correctness, intensity, and speed.     Assessment:  Jerilyn has not improved with her ability to kneel at this point. KT tape applied tighter laterally did not seem to make a difference. Tried a different approach to apply the tape on themedially to see if shifting more laterally would help. Noted a lot of tightness not just in hip flexors, but also her quadriceps, and perhaps pulling up on the tibia so added some stretching. Pt will continue to benefit from skilled PT interventions to address current functional deficits and impairments.       Plan: Progress to/Continue with current program.       Timed:  Manual Therapy:            5_    mins  30501;  Ultrasound:                     0      mins  27677;   Therapeutic Exercise:    30     mins  45308;     Neuromuscular Veena:   0     mins  29658;    Therapeutic Activity:      0     mins  68882;      Iontophoresis              _0__   mins  Dry Needling               _0____   mins          Untimed:  Work Conditioning: __0__ mins 43714  Electrical Stimulation:    0    mins  80222 ( );  Mechanical Traction:       0        mins  59754;   Paraffin                       __0___  mins   Ice/Heat: __0__ mins      Timed Treatment:   35   mins   Total Treatment:     35   mins          VITALY Magaña License # [id number]  Physical Therapist Assistant

## 2024-03-20 ENCOUNTER — TREATMENT (OUTPATIENT)
Dept: PHYSICAL THERAPY | Facility: CLINIC | Age: 79
End: 2024-03-20
Payer: MEDICARE

## 2024-03-20 DIAGNOSIS — G89.29 CHRONIC PAIN OF LEFT KNEE: Primary | ICD-10-CM

## 2024-03-20 DIAGNOSIS — M25.562 CHRONIC PAIN OF LEFT KNEE: Primary | ICD-10-CM

## 2024-03-20 DIAGNOSIS — Z74.09 IMPAIRED FUNCTIONAL MOBILITY, BALANCE, GAIT, AND ENDURANCE: ICD-10-CM

## 2024-03-20 PROCEDURE — 97110 THERAPEUTIC EXERCISES: CPT | Performed by: PHYSICAL THERAPIST

## 2024-03-22 ENCOUNTER — TREATMENT (OUTPATIENT)
Dept: PHYSICAL THERAPY | Facility: CLINIC | Age: 79
End: 2024-03-22
Payer: MEDICARE

## 2024-03-22 DIAGNOSIS — Z74.09 IMPAIRED FUNCTIONAL MOBILITY, BALANCE, GAIT, AND ENDURANCE: ICD-10-CM

## 2024-03-22 DIAGNOSIS — M25.562 CHRONIC PAIN OF LEFT KNEE: Primary | ICD-10-CM

## 2024-03-22 DIAGNOSIS — G89.29 CHRONIC PAIN OF LEFT KNEE: Primary | ICD-10-CM

## 2024-03-22 PROCEDURE — 97110 THERAPEUTIC EXERCISES: CPT | Performed by: PHYSICAL THERAPIST

## 2024-03-22 PROCEDURE — 97035 APP MDLTY 1+ULTRASOUND EA 15: CPT | Performed by: PHYSICAL THERAPIST

## 2024-03-22 NOTE — PROGRESS NOTES
Physical Therapy Daily Treatment Note    Visit Diagnoses:    ICD-10-CM ICD-9-CM   1. Chronic pain of left knee  M25.562 719.46    G89.29 338.29   2. Impaired functional mobility, balance, gait, and endurance  Z74.09 V49.89       VISIT#: 6      Jerilyn Martinez reports: She did try kneeling with the tape on the medial side. She did get less tingling when the tape was on the lateral side. When she hits the lateral side of her knee it will tingle. She does not see any improvements with her knee since she began PT.   Current Pain Level:    0/10; Worst:   9/10 when kneeling; Best:  0/10  Affected Area: L Grade 2-3 sprain on the MCL and lateral meniscal tear. Indicates that the MCL sprain was chronic   Quality of Pain: knife-like, sharp and pressure   Functional Deficits/Irritating Factors: squatting (pressure to lateral knee while rolling over in bed, kneeling)   Progression: no changes/improvements  Compliance with HEP Reported: Yes    Objective        See Exercise, Manual, and Modality Logs for complete treatment.     Patient Education: Pt was educated on exercise biomechanical correctness, intensity, and speed.     Assessment:  Jerilyn has been very compliant with her PT attendance and with her HEP. However, she has not made any progress in function or pain level in the past three weeks. It was recommended to her to call her referring MD to make an appointment for further medical assessment. If she should require surgery, she may need visits in the future. Jerilyn was in agreement with this plan.      Plan: Progress to/Continue with current program. Jerilyn is to call and return to her referring MD.       Timed:  Manual Therapy:            5_    mins  20989;  Ultrasound:                     10      mins  14677;   Therapeutic Exercise:    15     mins  09167;     Neuromuscular Veena:   0     mins  38029;    Therapeutic Activity:      0     mins  59210;      Iontophoresis              _0__   mins  Dry Needling                _0____   mins         Untimed:  Work Conditioning: __0__ mins 41012  Electrical Stimulation:    0    mins  12697 ( );  Mechanical Traction:       0        mins  29496;   Paraffin                       __0___  mins   Ice/Heat: __0__ mins      Timed Treatment:   30   mins   Total Treatment:     30   mins          Sydney London PTA  [place] License # [id number]  Physical Therapist Assistant

## 2024-03-28 ENCOUNTER — OFFICE VISIT (OUTPATIENT)
Dept: ORTHOPEDIC SURGERY | Facility: CLINIC | Age: 79
End: 2024-03-28
Payer: MEDICARE

## 2024-03-28 VITALS — HEIGHT: 64 IN | BODY MASS INDEX: 25.08 KG/M2 | TEMPERATURE: 96.4 F | WEIGHT: 146.9 LBS

## 2024-03-28 DIAGNOSIS — M25.562 ACUTE PAIN OF LEFT KNEE: Primary | ICD-10-CM

## 2024-03-28 PROCEDURE — 99213 OFFICE O/P EST LOW 20 MIN: CPT | Performed by: NURSE PRACTITIONER

## 2024-03-28 PROCEDURE — 1159F MED LIST DOCD IN RCRD: CPT | Performed by: NURSE PRACTITIONER

## 2024-03-28 PROCEDURE — 1160F RVW MEDS BY RX/DR IN RCRD: CPT | Performed by: NURSE PRACTITIONER

## 2024-03-28 NOTE — PROGRESS NOTES
Patient: Jerilyn Martinez  YOB: 1945 79 y.o. female  Medical Record Number: [id number]    Chief Complaints:   Chief Complaint   Patient presents with    Left Knee - Follow-up       History of Present Illness:Jerilyn Martinez is a 79 y.o. female who presents with continued complaints of left lateral knee pain.  The patient had previous injury, she had an MRI which did show an old medial collateral ligament sprain as well as a small tear of the lateral meniscus.  Patient tried meloxicam with some improvement but reports physical therapy unfortunately has not helped with the pain.  While she does have some occasional aching the worst pain is when she gets down to kneel on the floor she has acute onset of a sharp pain involving the lateral compartment of the knee    Allergies:   Allergies   Allergen Reactions    Codeine Shortness Of Breath     CHEST PAIN;  Patient has taken Lortab/Norco in the past with no problem          Medications:   Current Outpatient Medications   Medication Sig Dispense Refill    cephalexin (Keflex) 500 MG capsule Take all 4 caps 1 hour prior to procedure 4 capsule 5    levothyroxine (SYNTHROID, LEVOTHROID) 75 MCG tablet Take 1 tablet by mouth Daily.      meloxicam (Mobic) 7.5 MG tablet Take 1 tablet by mouth Daily. 30 tablet 3    omeprazole (priLOSEC) 20 MG capsule Take 1 capsule by mouth Daily.      simvastatin (ZOCOR) 20 MG tablet 1 tablet Every Night.       No current facility-administered medications for this visit.     Facility-Administered Medications Ordered in Other Visits   Medication Dose Route Frequency Provider Last Rate Last Admin    Chlorhexidine Gluconate Cloth 2 % pads   Apply externally BID Cass Burgos APRN             The following portions of the patient's history were reviewed and updated as appropriate: allergies, current medications, past family history, past medical history, past social history, past surgical history and problem list.    Review of  "Systems:   14 point review of systems was performed. All systems negative except pertinent positives/negatives listed in HPI above    Physical Exam:   Vitals:    03/28/24 1457   Temp: 96.4 °F (35.8 °C)   TempSrc: Temporal   Weight: 66.6 kg (146 lb 14.4 oz)   Height: 163.8 cm (64.49\")   PainSc: 0-No pain   PainLoc: Knee       General: A and O x 3, ASA, NAD    Skin clear no unusual lesions noted  Left knee patient has no appreciable effusion 120 degrees flexion neutral in extension with a positive Zaira negative Lockman calf soft and nontender      Radiology:  Xrays 3views (ap,lateral, sunrise) previous x-rays of the left knee as well as MRI were reviewed and findings are above    Assessment/Plan: Left knee pain, failed conservative measures    Patient and I discussed options, unfortunately the physical therapy has not helped and she continues to have quite a bit of pain, for that reason I will refer her to see Dr. Yaa Bronson to see if knee arthroscopy would even be an option.  In the meantime Tylenol as needed would be happy to see her back as needed      Cass Burgos, APRN  3/28/2024  "

## 2024-04-11 ENCOUNTER — TELEPHONE (OUTPATIENT)
Dept: SURGERY | Facility: CLINIC | Age: 79
End: 2024-04-11
Payer: MEDICARE

## 2024-04-15 ENCOUNTER — OFFICE VISIT (OUTPATIENT)
Dept: ORTHOPEDIC SURGERY | Facility: CLINIC | Age: 79
End: 2024-04-15
Payer: MEDICARE

## 2024-04-15 VITALS — BODY MASS INDEX: 23.27 KG/M2 | TEMPERATURE: 98.2 F | HEIGHT: 66 IN | WEIGHT: 144.8 LBS

## 2024-04-15 DIAGNOSIS — S83.282A OTHER TEAR OF LATERAL MENISCUS OF LEFT KNEE AS CURRENT INJURY, INITIAL ENCOUNTER: Primary | ICD-10-CM

## 2024-04-15 NOTE — PROGRESS NOTES
New Left Knee      Patient: Jerilyn Martinez        YOB: 1945    Medical Record Number: [id number]        Chief Complaints: Left knee pain      History of Present Illness: This is a 79-year-old female who was sent here by Meena Burgos who presents complaining of left knee pain she states she fell in October and it was hurting some then her symptoms have persisted and is anterior but also lateral knee pain she did have some swelling she has no night pain she has done physical therapy which only made it worse.  She is very limited in her activity.  She is status post total knee replacement on the right and is done well with that.  Her past medical history as listed below        Allergies:   Allergies   Allergen Reactions    Codeine Shortness Of Breath     CHEST PAIN;  Patient has taken Lortab/Norco in the past with no problem          Medications:   Home Medications:  Current Outpatient Medications on File Prior to Visit   Medication Sig    cephalexin (Keflex) 500 MG capsule Take all 4 caps 1 hour prior to procedure    levothyroxine (SYNTHROID, LEVOTHROID) 75 MCG tablet Take 1 tablet by mouth Daily.    meloxicam (Mobic) 7.5 MG tablet Take 1 tablet by mouth Daily.    omeprazole (priLOSEC) 20 MG capsule Take 1 capsule by mouth Daily.    simvastatin (ZOCOR) 20 MG tablet 1 tablet Every Night.     Current Facility-Administered Medications on File Prior to Visit   Medication    Chlorhexidine Gluconate Cloth 2 % pads     Current Medications:  Scheduled Meds:  Continuous Infusions:No current facility-administered medications for this visit.    PRN Meds:.    Past Medical History:   Diagnosis Date    Arthritis     Disease of thyroid gland     HYPOTHYROIDISM    GERD (gastroesophageal reflux disease)     High cholesterol     History of bilateral breast implants     Iron deficiency anemia     Lumbar stenosis     AND CERVICAL    Lung cancer     RIGHT UPPER LOBE    Murmur, heart     Periodic heart flutter     2O  PLUS YEARS AGO    Right knee pain     Slow to wake up after anesthesia     Slow to wake up after anesthesia     Spinal stenosis     Wears contact lenses         Past Surgical History:   Procedure Laterality Date    APPENDECTOMY      BUNIONECTOMY      X 2    HYSTERECTOMY      LASIK      THORACOSCOPY Right 2017    Procedure: BRONCHOSCOPY, RIGHT VAT, ROBOT ASSISTED RIGHT UPPER LOBECTOMY, INTERCOSTAL NERVE BLOCK WITH CRYOA;  Surgeon: Yuri Brizuela III, MD;  Location: [facility];  Service:     THYROID SURGERY      nodule removed    TONSILLECTOMY AND ADENOIDECTOMY      TOTAL KNEE ARTHROPLASTY Right 2021    Procedure: TOTAL KNEE ARTHROPLASTY;  Surgeon: Monroe Vela MD;  Location: [facility];  Service: Orthopedics;  Laterality: Right;        Social History     Occupational History    Not on file   Tobacco Use    Smoking status: Former     Current packs/day: 0.00     Average packs/day: 2.0 packs/day for 20.0 years (40.0 ttl pk-yrs)     Types: Cigarettes     Start date:      Quit date:      Years since quittin.3    Smokeless tobacco: Never    Tobacco comments:     QUIT OVER 30 YEARS AGO   Vaping Use    Vaping status: Never Used   Substance and Sexual Activity    Alcohol use: Yes     Comment: socially    Drug use: No    Sexual activity: Defer     Partners: Female     Birth control/protection: Post-menopausal      Social History     Social History Narrative    Not on file        Family History   Problem Relation Age of Onset    Lung cancer Mother     Lung cancer Father     Lung cancer Sister     Malig Hyperthermia Neg Hx              Review of Systems:     Review of Systems      Physical Exam: 79 y.o. female  General Appearance:    Alert, cooperative, in no acute distress                 There were no vitals filed for this visit.   Patient is alert and read ×3 no acute distress appears her above-listed at height weight and age.  Affect is normal respiratory rate is normal unlabored. Heart  "rate regular rate rhythm, sclera, dentition and hearing are normal for the purpose of this exam.        Ortho Exam  Physical exam of the left knee reveals no effusion no redness.  The patient does have tenderness about the lateral joint line.  No tenderness about the medial joint line.  A negative bounce home and a positive lateral Zaira.    Patient has a stable ligamentous exam.  The patient has a negative Lachman and negative anterior drawer and a negative pivot shift.  Quads are reasonable and symmetric bilaterally.  Calf is soft and nontender.  There is no overlying skin changes no lymphedema lymphadenopathy.  Patient has good hip range of motion full symmetric and asymptomatic and a normal ankle exam   Physical Exam: 79 y.o. female  General Appearance:    Alert, cooperative, in no acute distress                      Vitals:    04/15/24 1350   Temp: 98.2 °F (36.8 °C)   Weight: 65.7 kg (144 lb 12.8 oz)   Height: 167.6 cm (66\")   PainSc: 10-Worst pain ever        Head:    Normocephalic, without obvious abnormality, atraumatic   Eyes:            conjunctivae and sclerae normal, no pallor, corneas clear,    Ears:    Ears appear intact with no abnormalities noted   Throat:   No oral lesions, no thrush, oral mucosa moist   Neck:   No adenopathy, supple, trachea midline, no thyromegaly,    Back:     No kyphosis present, no scoliosis present, no skin lesions,      erythema or scars, no tenderness to percussion or                   palpation,   range of motion normal   Lungs:     ,respirations regular, even and                  unlabored    Heart:    Regular rhythm and normal rate               Chest Wall:    No abnormalities observed               Pulses:   Pulses palpable and equal bilaterally   Skin:   No bleeding, bruising or rash   Lymph nodes:   No palpable adenopathy   Neurologic:   Appears neurologic intact       Procedures             Radiology:   AP, Lateral and merchant views of the left knee  werereviewed " to evauateknee pain.  I have reviewed these on the left she has good maintenance of her joint space medially and laterally she has some mild patellofemoral OA she has evidence of right total knee arthroplasty.  She also has an MRI which shows some mild degenerative changes at the patellofemoral joint but also a lateral meniscus tear I have reviewed and agree  Imaging Results (Most Recent)       None          Assessment/Plan:    Lateral meniscus tear she is given this time she is done physical therapy all with no lasting improvement it is limiting her activities plan is to proceed with arthroscopy we discussed in detail risk benefits and alternatives The patient voiced understanding of the risks, benefits, and alternative forms of treatment that were discussed and the patient consents to proceed with the above listed surgery.  All risks, benefits and alternatives were discussed.  Risks including to but not exclusive to anesthetic complications, including death, MI, CVA, infection, bleeding DVT, fracture, residual pain and need for future surgery.  She understands and agrees to proceed she also understands perioperative regimen

## 2024-04-17 ENCOUNTER — TELEPHONE (OUTPATIENT)
Dept: ORTHOPEDIC SURGERY | Facility: CLINIC | Age: 79
End: 2024-04-17

## 2024-04-17 NOTE — TELEPHONE ENCOUNTER
HUB AGENT ATTEMPTED NON-CLINICAL WARM TRANSFER - NO ANSWER     PATIENT STATED SHE LEFT VMAIL w/SURGERY SCHEDULER MARINA ALREADY THIS MORNING 04-17-24 RETURNING MARINA's VMAIL FROM YESTERDAY 04-16-24 re SURGERY REFERRAL [id number]     BUT PLEASE CALL / LEAVE VMAIL TO DISCUSS PATIENT's WISH TO CANCEL LEFT KNEE MENISCUS REPAIR SURGERY & GET A INJECTION INSTEAD AS DISCUSSED AT 04-15-24 APPT w/DR CARR     THANKS

## 2024-04-30 NOTE — PROGRESS NOTES
Patient: Jerilyn Martinez  YOB: 1945  Date of Service: 4/30/2024    Chief Complaints: Left knee pain    Subjective:    History of Present Illness: Pt is seen in the office today with complaints of left knee pain she has a known lateral meniscus tear and degenerative change patellofemoral joint we have talked in detail about scoping her however she said she thought about it she thought she wants to hold off on that she seems to be doing well we talked about doing an injection she would like to discuss this further today        Allergies:   Allergies   Allergen Reactions    Codeine Shortness Of Breath     CHEST PAIN;  Patient has taken Lortab/Norco in the past with no problem          Medications:   Home Medications:  Current Outpatient Medications on File Prior to Visit   Medication Sig    Calcium Carbonate-Vitamin D 600-10 MG-MCG per tablet Every 12 (Twelve) Hours.    cephalexin (Keflex) 500 MG capsule Take all 4 caps 1 hour prior to procedure (Patient not taking: Reported on 4/15/2024)    levothyroxine (SYNTHROID, LEVOTHROID) 75 MCG tablet Take 1 tablet by mouth Daily.    meloxicam (Mobic) 7.5 MG tablet Take 1 tablet by mouth Daily.    omeprazole (priLOSEC) 20 MG capsule Take 1 capsule by mouth Daily.    Omeprazole 20 MG Tablet Delayed Release Dispersible Daily.    Pediatric Multivitamins-Fl (MultiVitamin + Fluoride) 0.25 MG chewable tablet Daily.    simvastatin (ZOCOR) 20 MG tablet 1 tablet Every Night.     Current Facility-Administered Medications on File Prior to Visit   Medication    Chlorhexidine Gluconate Cloth 2 % pads     Current Medications:  Scheduled Meds:  Continuous Infusions:No current facility-administered medications for this visit.    PRN Meds:.    I have reviewed the patient's medical history in detail and updated the computerized patient record.  Review and summarization of old records include:    Past Medical History:   Diagnosis Date    Arthritis     Disease of thyroid gland      HYPOTHYROIDISM    GERD (gastroesophageal reflux disease)     High cholesterol     History of bilateral breast implants     Iron deficiency anemia     Lumbar stenosis     AND CERVICAL    Lung cancer     RIGHT UPPER LOBE    Murmur, heart     Periodic heart flutter     2O PLUS YEARS AGO    Right knee pain     Slow to wake up after anesthesia     Slow to wake up after anesthesia     Spinal stenosis     Wears contact lenses         Past Surgical History:   Procedure Laterality Date    APPENDECTOMY      BUNIONECTOMY      X 2    HYSTERECTOMY      LASIK      THORACOSCOPY Right 2017    Procedure: BRONCHOSCOPY, RIGHT VAT, ROBOT ASSISTED RIGHT UPPER LOBECTOMY, INTERCOSTAL NERVE BLOCK WITH CRYOA;  Surgeon: Yuri Brizuela III, MD;  Location: [facility];  Service:     THYROID SURGERY      nodule removed    TONSILLECTOMY AND ADENOIDECTOMY      TOTAL KNEE ARTHROPLASTY Right 2021    Procedure: TOTAL KNEE ARTHROPLASTY;  Surgeon: Monroe Vela MD;  Location: [facility];  Service: Orthopedics;  Laterality: Right;        Social History     Occupational History    Not on file   Tobacco Use    Smoking status: Former     Current packs/day: 0.00     Average packs/day: 2.0 packs/day for 20.0 years (40.0 ttl pk-yrs)     Types: Cigarettes     Start date:      Quit date:      Years since quittin.3    Smokeless tobacco: Never    Tobacco comments:     QUIT OVER 30 YEARS AGO   Vaping Use    Vaping status: Never Used   Substance and Sexual Activity    Alcohol use: Yes     Comment: socially    Drug use: No    Sexual activity: Defer     Partners: Female     Birth control/protection: Post-menopausal      Social History     Social History Narrative    Not on file        Family History   Problem Relation Age of Onset    Lung cancer Mother     Lung cancer Father     Lung cancer Sister     Malig Hyperthermia Neg Hx        ROS: 14 point review of systems was performed and was negative except for documented findings in  HPI and today's encounter.     Allergies:   Allergies   Allergen Reactions    Codeine Shortness Of Breath     CHEST PAIN;  Patient has taken Lortab/Norco in the past with no problem        Constitutional:  Denies fever, shaking or chills   Eyes:  Denies change in visual acuity   HENT:  Denies nasal congestion or sore throat   Respiratory:  Denies cough or shortness of breath   Cardiovascular:  Denies chest pain or severe LE edema   GI:  Denies abdominal pain, nausea, vomiting, bloody stools or diarrhea   Musculoskeletal:  Numbness, tingling, or loss of motor function only as noted above in history of present illness.  : Denies painful urination or hematuria  Integument:  Denies rash, lesion or ulceration   Neurologic:  Denies headache or focal weakness  Endocrine:  Denies lymphadenopathy  Psych:  Denies confusion or change in mental status   Hem:  Denies active bleeding      Physical Exam: 79 y.o. female  Wt Readings from Last 3 Encounters:   04/15/24 65.7 kg (144 lb 12.8 oz)   03/28/24 66.6 kg (146 lb 14.4 oz)   02/27/24 63.5 kg (140 lb)       There is no height or weight on file to calculate BMI.    There were no vitals filed for this visit.  Vital signs reviewed.   General Appearance:    Alert, cooperative, in no acute distress                    Ortho exam      Physical exam of the left knee reveals no effusion no redness.  The patient does have tenderness about the lateral joint line.  No tenderness about the medial joint line.  A negative bounce home and a positive lateral Zaira.    Patient has a stable ligamentous exam.  The patient has a negative Lachman and negative anterior drawer and a negative pivot shift.  Quads are reasonable and symmetric bilaterally.  Calf is soft and nontender.  There is no overlying skin changes no lymphedema lymphadenopathy.  Patient has good hip range of motion full symmetric and asymptomatic and a normal ankle exam              Assessment: Left knee pain with lateral  meniscus tear she does have some patellofemoral arthritis.  I think an injection is quite reasonable to see if we can give her some element of relief she understands portance of quad and core strengthening she understands the potential for arthroscopy in the future    Plan:   Follow up as indicated.  Ice, elevate, and rest as needed.  Discussed conservative measures of pain control including ice, bracing.    Yaa Bronson M.D.    Large Joint Arthrocentesis: L knee  Date/Time: 5/2/2024 9:04 AM  Consent given by: patient  Site marked: site marked  Timeout: Immediately prior to procedure a time out was called to verify the correct patient, procedure, equipment, support staff and site/side marked as required   Supporting Documentation  Indications: pain   Procedure Details  Location: knee - L knee  Preparation: Patient was prepped and draped in the usual sterile fashion  Needle gauge: 21G.  Medications administered: 1 mL methylPREDNISolone acetate 80 MG/ML; 2 mL lidocaine PF 1% 1 %  Patient tolerance: patient tolerated the procedure well with no immediate complications

## 2024-05-02 ENCOUNTER — OFFICE VISIT (OUTPATIENT)
Dept: ORTHOPEDIC SURGERY | Facility: CLINIC | Age: 79
End: 2024-05-02
Payer: MEDICARE

## 2024-05-02 VITALS — BODY MASS INDEX: 24.09 KG/M2 | WEIGHT: 144.6 LBS | TEMPERATURE: 98.7 F | HEIGHT: 65 IN

## 2024-05-02 DIAGNOSIS — S83.282D ACUTE LATERAL MENISCUS TEAR OF LEFT KNEE, SUBSEQUENT ENCOUNTER: Primary | ICD-10-CM

## 2024-05-02 DIAGNOSIS — M17.10 PATELLOFEMORAL ARTHRITIS: ICD-10-CM

## 2024-05-02 RX ORDER — METHYLPREDNISOLONE ACETATE 80 MG/ML
1 INJECTION, SUSPENSION INTRA-ARTICULAR; INTRALESIONAL; INTRAMUSCULAR; SOFT TISSUE
Status: COMPLETED | OUTPATIENT
Start: 2024-05-02 | End: 2024-05-02

## 2024-05-02 RX ORDER — LIDOCAINE HYDROCHLORIDE 10 MG/ML
2 INJECTION, SOLUTION EPIDURAL; INFILTRATION; INTRACAUDAL; PERINEURAL
Status: COMPLETED | OUTPATIENT
Start: 2024-05-02 | End: 2024-05-02

## 2024-05-02 RX ADMIN — METHYLPREDNISOLONE ACETATE 1 ML: 80 INJECTION, SUSPENSION INTRA-ARTICULAR; INTRALESIONAL; INTRAMUSCULAR; SOFT TISSUE at 09:04

## 2024-05-02 RX ADMIN — LIDOCAINE HYDROCHLORIDE 2 ML: 10 INJECTION, SOLUTION EPIDURAL; INFILTRATION; INTRACAUDAL; PERINEURAL at 09:04

## 2024-05-22 DIAGNOSIS — M25.562 CHRONIC PAIN OF LEFT KNEE: ICD-10-CM

## 2024-05-22 DIAGNOSIS — G89.29 CHRONIC PAIN OF LEFT KNEE: ICD-10-CM

## 2024-05-22 RX ORDER — MELOXICAM 7.5 MG/1
7.5 TABLET ORAL DAILY
Qty: 90 TABLET | Refills: 0 | Status: SHIPPED | OUTPATIENT
Start: 2024-05-22

## 2024-05-22 NOTE — TELEPHONE ENCOUNTER
Last refill: 2/27/24    Last appt: 3/28/24 with Cass. Had appt with Dr. Bronson on 5/2/24    Next appt: NONE

## 2024-08-19 DIAGNOSIS — G89.29 CHRONIC PAIN OF LEFT KNEE: ICD-10-CM

## 2024-08-19 DIAGNOSIS — M25.562 CHRONIC PAIN OF LEFT KNEE: ICD-10-CM

## 2024-08-19 RX ORDER — MELOXICAM 7.5 MG/1
7.5 TABLET ORAL DAILY
Qty: 90 TABLET | Refills: 0 | Status: SHIPPED | OUTPATIENT
Start: 2024-08-19

## 2024-08-19 NOTE — TELEPHONE ENCOUNTER
Spoke to patient and she stated she did request a refill of her Meloxicam. She stated she has a week supply left and is going out of town and needs it refilled before she leaves.    Last refilled: 5/23/24 of a 90 day supply by you    Last seen: 3/28/24 by you, 5/2/24 by Dr. Audie Holland appt: NONE

## 2024-09-11 ENCOUNTER — HOSPITAL ENCOUNTER (OUTPATIENT)
Dept: CT IMAGING | Facility: HOSPITAL | Age: 79
Discharge: HOME | End: 2024-09-11
Admitting: STUDENT IN AN ORGANIZED HEALTH CARE EDUCATION/TRAINING PROGRAM
Payer: MEDICARE

## 2024-09-11 DIAGNOSIS — C34.11 MALIGNANT NEOPLASM OF UPPER LOBE OF RIGHT LUNG: ICD-10-CM

## 2024-09-11 DIAGNOSIS — R91.8 PULMONARY NODULES: ICD-10-CM

## 2024-09-11 DIAGNOSIS — Z48.3 AFTERCARE FOLLOWING SURGERY FOR NEOPLASM: ICD-10-CM

## 2024-09-11 PROCEDURE — 71250 CT THORAX DX C-: CPT

## 2024-09-18 ENCOUNTER — TELEPHONE (OUTPATIENT)
Dept: SURGERY | Facility: CLINIC | Age: 79
End: 2024-09-18
Payer: MEDICARE

## 2024-10-03 ENCOUNTER — OFFICE VISIT (OUTPATIENT)
Dept: SURGERY | Facility: CLINIC | Age: 79
End: 2024-10-03
Payer: MEDICARE

## 2024-10-03 VITALS
HEART RATE: 90 BPM | SYSTOLIC BLOOD PRESSURE: 120 MMHG | DIASTOLIC BLOOD PRESSURE: 68 MMHG | OXYGEN SATURATION: 98 % | BODY MASS INDEX: 24.13 KG/M2 | WEIGHT: 145 LBS

## 2024-10-03 DIAGNOSIS — C34.11 MALIGNANT NEOPLASM OF UPPER LOBE OF RIGHT LUNG: Primary | ICD-10-CM

## 2024-10-03 PROCEDURE — 99213 OFFICE O/P EST LOW 20 MIN: CPT | Performed by: SURGERY

## 2024-10-03 NOTE — PROGRESS NOTES
THORACIC SURGERY CLINIC CONSULT NOTE    REASON FOR CONSULT: Lung cancer surveillance    Subjective   HISTORY OF PRESENTING ILLNESS:   Jerilyn Martinez is a 79 y.o. female who has significant medical problems as mentioned in the medical chart.     History of Present Illness  Ms. Martinez is a pleasant 78-year-old lady who presents today for continued postoperative surveillance after undergoing a right upper lobectomy in February 2017 for adenocarcinoma of the lung.  Since then, she has been undergoing annual CT scans as recommended. She has not experienced any new health issues or hospitalizations in the past year. Despite being able to walk approximately 4 miles daily, she experiences shortness of breath and heavy breathing.     SOCIAL HISTORY  She quit smoking 30 years ago.    Past Medical History:   Diagnosis Date    Arthritis     Disease of thyroid gland     HYPOTHYROIDISM    GERD (gastroesophageal reflux disease)     High cholesterol     History of bilateral breast implants     Iron deficiency anemia     Lumbar stenosis     AND CERVICAL    Lung cancer     RIGHT UPPER LOBE    Murmur, heart     Periodic heart flutter     2O PLUS YEARS AGO    Right knee pain     Slow to wake up after anesthesia     Slow to wake up after anesthesia     Spinal stenosis     Wears contact lenses        Past Surgical History:   Procedure Laterality Date    APPENDECTOMY      BUNIONECTOMY      X 2    HYSTERECTOMY      LASIK      THORACOSCOPY Right 2/17/2017    Procedure: BRONCHOSCOPY, RIGHT VAT, ROBOT ASSISTED RIGHT UPPER LOBECTOMY, INTERCOSTAL NERVE BLOCK WITH CRYOA;  Surgeon: Yuri Brizuela III, MD;  Location: [facility] OR;  Service:     THYROID SURGERY      nodule removed    TONSILLECTOMY AND ADENOIDECTOMY      TOTAL KNEE ARTHROPLASTY Right 7/16/2021    Procedure: TOTAL KNEE ARTHROPLASTY;  Surgeon: Monroe Vela MD;  Location: [facility] OR;  Service: Orthopedics;  Laterality: Right;       Family History   Problem Relation Age  of Onset    Lung cancer Mother     Lung cancer Father     Lung cancer Sister     Malig Hyperthermia Neg Hx        Social History     Socioeconomic History    Marital status:    Tobacco Use    Smoking status: Former     Current packs/day: 0.00     Average packs/day: 2.0 packs/day for 20.0 years (40.0 ttl pk-yrs)     Types: Cigarettes     Start date:      Quit date:      Years since quittin.7    Smokeless tobacco: Never    Tobacco comments:     QUIT OVER 30 YEARS AGO   Vaping Use    Vaping status: Never Used   Substance and Sexual Activity    Alcohol use: Yes     Comment: socially    Drug use: No    Sexual activity: Defer     Partners: Female     Birth control/protection: Post-menopausal         Current Outpatient Medications:     Calcium Carbonate-Vitamin D 600-10 MG-MCG per tablet, Every 12 (Twelve) Hours., Disp: , Rfl:     cephalexin (Keflex) 500 MG capsule, Take all 4 caps 1 hour prior to procedure (Patient not taking: Reported on 4/15/2024), Disp: 4 capsule, Rfl: 5    levothyroxine (SYNTHROID, LEVOTHROID) 75 MCG tablet, Take 1 tablet by mouth Daily., Disp: , Rfl:     meloxicam (MOBIC) 7.5 MG tablet, Take 1 tablet by mouth Daily., Disp: 90 tablet, Rfl: 0    omeprazole (priLOSEC) 20 MG capsule, Take 1 capsule by mouth Daily., Disp: , Rfl:     Omeprazole 20 MG Tablet Delayed Release Dispersible, Daily., Disp: , Rfl:     Pediatric Multivitamins-Fl (MultiVitamin + Fluoride) 0.25 MG chewable tablet, Daily., Disp: , Rfl:     simvastatin (ZOCOR) 20 MG tablet, 1 tablet Every Night., Disp: , Rfl:   No current facility-administered medications for this visit.    Facility-Administered Medications Ordered in Other Visits:     Chlorhexidine Gluconate Cloth 2 % pads, , Apply externally, BID, Aubrey, Cass L, APRN     Allergies   Allergen Reactions    Codeine Shortness Of Breath     CHEST PAIN;  Patient has taken Lortab/Norco in the past with no problem                Objective    OBJECTIVE:     VITAL  SIGNS:  There were no vitals taken for this visit.    PHYSICAL EXAM:  Normal appearance.   Head is normocephalic.   Nose appears normal.   No obvious deformity of the mouth and throat.  Conjunctivae normal.   Heart rate and rhythm is normal.  Pulmonary effort is normal.   Moving all 4 extremities.  Extremities warm.  No focal deficit present.   He is alert and oriented to person, place, and time.     LAB RESULTS:  I have reviewed all the available laboratory results in the chart.    RESULTS REVIEW:  I have reviewed the patient's all relevant laboratory and imaging findings.     Results  Imaging  CT scan showed no evidence of cancer.    ASSESSMENT & PLAN:  Jerilyn Martinez is a 79 y.o. female with significant medical conditions as mentioned above presented to my clinic.  Assessment & Plan  1. Stage I Lung Cancer.  Her recent CT scan results are satisfactory, showing no evidence of cancer recurrence. Given that it has been 7 years since her diagnosis, the likelihood of recurrence is low. The presence of lung nodules does not necessarily indicate cancer; they could be scar tissue or infection. If the nodules remain stable in size, they are likely benign. A follow-up CT scan has been ordered for 1 year. Subsequent scans can be arranged through either our facility or her primary care physician.    Follow-up  Return in 1 year for follow up.    I discussed the patients findings and my recommendations with the patient. The patient was given adequate time to ask questions and all questions were answered to patient satisfaction. Thank you for this consult and allowing us to participate in the care of your patient.      Dmitriy Hayes MD  Thoracic Surgeon  [facility]        Dictated utilizing Dragon dictation    I spent 20 minutes caring for Jerilyn on this date of service. This time includes time spent by me in the following activities:preparing for the visit, reviewing tests, obtaining and/or reviewing  a separately obtained history, performing a medically appropriate examination and/or evaluation , counseling and educating the patient/family/caregiver, ordering medications, tests, or procedures, referring and communicating with other health care professionals , documenting information in the medical record, independently interpreting results and communicating that information with the patient/family/caregiver, and care coordination and more than half the time was spent in direct face to face evaluation and decision making.     Patient or patient representative verbalized consent for the use of Ambient Listening during the visit with  Dmitriy Hayes MD for chart documentation. 10/25/2024  08:58 EDT

## 2024-11-20 DIAGNOSIS — G89.29 CHRONIC PAIN OF LEFT KNEE: ICD-10-CM

## 2024-11-20 DIAGNOSIS — M25.562 CHRONIC PAIN OF LEFT KNEE: ICD-10-CM

## 2024-11-20 RX ORDER — MELOXICAM 7.5 MG/1
7.5 TABLET ORAL DAILY
Qty: 90 TABLET | Refills: 0 | Status: SHIPPED | OUTPATIENT
Start: 2024-11-20

## 2024-11-20 NOTE — TELEPHONE ENCOUNTER
Please review and advise  I did call and confirmed pt would like RF on this Rx  Last FD 08/19/2024 by QUE  Last OV 05/02/2024 with PARISA  Next OV 11/21/2024 with QUE

## 2024-11-21 ENCOUNTER — OFFICE VISIT (OUTPATIENT)
Dept: ORTHOPEDIC SURGERY | Facility: CLINIC | Age: 79
End: 2024-11-21
Payer: MEDICARE

## 2024-11-21 VITALS — HEIGHT: 66 IN | BODY MASS INDEX: 23.5 KG/M2 | TEMPERATURE: 97.5 F | WEIGHT: 146.2 LBS

## 2024-11-21 DIAGNOSIS — M70.61 TROCHANTERIC BURSITIS OF RIGHT HIP: ICD-10-CM

## 2024-11-21 DIAGNOSIS — R52 PAIN: Primary | ICD-10-CM

## 2024-11-21 PROCEDURE — 99214 OFFICE O/P EST MOD 30 MIN: CPT | Performed by: NURSE PRACTITIONER

## 2024-11-21 RX ORDER — METHYLPREDNISOLONE 4 MG/1
TABLET ORAL
Qty: 21 TABLET | Refills: 0 | Status: SHIPPED | OUTPATIENT
Start: 2024-11-21

## 2024-11-21 NOTE — PROGRESS NOTES
Patient: Jerilyn Martinez  YOB: 1945 79 y.o. female  Medical Record Number: [id number]    Chief Complaints:   Chief Complaint   Patient presents with    Left Hip - Pain, Initial Evaluation       History of Present Illness:Jerilyn Martinez is a 79 y.o. female who presents with complaints of worsening in left hip pain.  Patient reports she has had pain off and on left hip for years the last 2 weeks it definitely got worse.  Pain is primarily lateral hip definitely worse at night when she tries to lie down and go to sleep.  She denies any recent injury.    Allergies:   Allergies   Allergen Reactions    Codeine Shortness Of Breath     CHEST PAIN;  Patient has taken Lortab/Norco in the past with no problem          Medications:   Current Outpatient Medications   Medication Sig Dispense Refill    Calcium Carbonate-Vitamin D 600-10 MG-MCG per tablet Every 12 (Twelve) Hours.      levothyroxine (SYNTHROID, LEVOTHROID) 75 MCG tablet Take 1 tablet by mouth Daily.      meloxicam (MOBIC) 7.5 MG tablet TAKE 1 TABLET BY MOUTH DAILY 90 tablet 0    omeprazole (priLOSEC) 20 MG capsule Take 1 capsule by mouth Daily.      Pediatric Multivitamins-Fl (MultiVitamin + Fluoride) 0.25 MG chewable tablet Daily.      simvastatin (ZOCOR) 20 MG tablet 1 tablet Every Night.      cephalexin (Keflex) 500 MG capsule Take all 4 caps 1 hour prior to procedure (Patient not taking: Reported on 11/21/2024) 4 capsule 5     No current facility-administered medications for this visit.     Facility-Administered Medications Ordered in Other Visits   Medication Dose Route Frequency Provider Last Rate Last Admin    Chlorhexidine Gluconate Cloth 2 % pads   Apply externally BID Cass Burgos APRN             The following portions of the patient's history were reviewed and updated as appropriate: allergies, current medications, past family history, past medical history, past social history, past surgical history and problem list.    Review of  "Systems:   14 point review of systems was performed. All systems negative except pertinent positives/negatives listed in HPI above    Physical Exam:   Vitals:    11/21/24 1034   Temp: 97.5 °F (36.4 °C)   Weight: 66.3 kg (146 lb 3.2 oz)   Height: 167.6 cm (66\")   PainSc:   3   PainLoc: Hip       General: A and O x 3, ASA, NAD   Skin clear no unusual lesions noted  Left hip patient is very tender palpation over left hip greater trochanteric bursa she otherwise has normal internal extra rotation with a negative Stinchfield negative logroll calf soft and nontender       Radiology:  Xrays 2 views of left hip ordered and reviewed today secondary to increased pain show mild arthritic changes.  No compared to views available    Assessment/Plan: Left hip greater trochanteric bursa bursitis with increased pain    Patient and I discussed options, she would like to hold off on injection instead we will try prescription for a steroid pack as well as physical therapy.  She will let me know if her symptoms do not improve      Cass Burgos, APRN  11/21/2024  "

## 2024-11-25 ENCOUNTER — TELEPHONE (OUTPATIENT)
Dept: ORTHOPEDIC SURGERY | Facility: CLINIC | Age: 79
End: 2024-11-25

## 2024-11-25 NOTE — TELEPHONE ENCOUNTER
Caller: CLIFFORD    Relationship: SELF    Best call back number: [phone]    What orders are you requesting (i.e. lab or imaging): PATIENT WAS SEEN FOR LEFT HIP PAIN- PHYSICAL THERAPY REFERRAL STATED RIGHT HIP AND SO DOES OFFICE NOTE- SHE WOULD LIKE TO HAVE THAT CORRECTED FOR LEFT HIP PAIN- PLEASE CALL-

## 2024-11-25 NOTE — TELEPHONE ENCOUNTER
Could you please call patient and reassure her that all of her office notes say left hip, but physical therapy order has been corrected

## 2024-12-02 ENCOUNTER — TREATMENT (OUTPATIENT)
Dept: PHYSICAL THERAPY | Facility: CLINIC | Age: 79
End: 2024-12-02
Payer: MEDICARE

## 2024-12-02 DIAGNOSIS — Z74.09 IMPAIRED FUNCTIONAL MOBILITY AND ACTIVITY TOLERANCE: ICD-10-CM

## 2024-12-02 DIAGNOSIS — M70.62 GREATER TROCHANTERIC BURSITIS OF LEFT HIP: ICD-10-CM

## 2024-12-02 DIAGNOSIS — M25.552 LEFT HIP PAIN: Primary | ICD-10-CM

## 2024-12-02 PROCEDURE — 97035 APP MDLTY 1+ULTRASOUND EA 15: CPT | Performed by: PHYSICAL THERAPIST

## 2024-12-02 PROCEDURE — 97140 MANUAL THERAPY 1/> REGIONS: CPT | Performed by: PHYSICAL THERAPIST

## 2024-12-02 PROCEDURE — 97110 THERAPEUTIC EXERCISES: CPT | Performed by: PHYSICAL THERAPIST

## 2024-12-02 PROCEDURE — 97162 PT EVAL MOD COMPLEX 30 MIN: CPT | Performed by: PHYSICAL THERAPIST

## 2024-12-02 NOTE — PROGRESS NOTES
Physical Therapy Initial Evaluation and Plan of Care  [facility] Physical Therapy [facility]  [address], [address]  [place] [address]    Patient: Jerilyn Martinez   : 1945  Diagnosis/ICD-10 Code:  Left hip pain [M25.552]  Referring practitioner: ADELITA Martins  Today's Date: 2024    Subjective Evaluation    History of Present Illness  Mechanism of injury: Patient developed left hip pain a fe months ago.  Got much worse 3-4 weeks ago  Saw ADELITA Mauricio a couple of weeks ago - xrays showed hip joint OK but diagnosed as trochanteric bursitis - prescribed steroids and completed them yesterday - some relief but had pain last night but did work yesterday and had been up on her feet most of the weekend shopping      Patient Occupation: [employer] - Pain  Current pain ratin  At best pain ratin  At worst pain ratin  Location: left lateral hip, lower back pops  Quality: dull ache and discomfort  Relieving factors: medications and relaxation  Aggravating factors: ambulation, movement, standing and sleeping  Progression: improved    Social Support  Lives in: condominium    Diagnostic Tests  X-ray: normal    Treatments  Previous treatment: medication  Current treatment: physical therapy  Patient Goals  Patient goal: not to have pain, sleep without pain interruption         Objective          Observations     Additional Hip Observation Details  Right leg a bit longer than the left    Palpation   Left   Tenderness of the gluteus medius and piriformis.     Tenderness     Left Hip   Tenderness in the greater trochanter.     Lumbar Screen  Lumbar range of motion within normal limits with the following exceptions:Full and pain free spinal motion     Neurological Testing     Sensation     Hip   Left Hip   Intact: light touch    Right Hip   Intact: light touch    Active Range of Motion   Left Hip   Flexion: 95 degrees   Abduction: 30 degrees   Adduction: 30  degrees   External rotation (90/90): 40 degrees   Internal rotation (90/90): 35 degrees     Strength/Myotome Testing     Left Hip   Planes of Motion   Flexion: 4+  Extension: 5  Abduction: 4+    Tests     Left Hip   Positive FREDIS and [place].   SLR: Negative.           Assessment & Plan       Assessment  Impairments: abnormal muscle firing, abnormal or restricted ROM, activity intolerance, impaired physical strength, lacks appropriate home exercise program, pain with function and weight-bearing intolerance   Functional limitations: lifting, walking, standing and stooping   Assessment details: 79 y.o. female with left hip greater trochanteric bursitis presents with: 1. Intermittent left lateral hip pain, 2. Slightly decreased hip AROM, 3. Tenderness to palpation left posterior lateral hip, 4. No signs of joint laxity, 5. Decreased tolerance for prolonged standing and walking  Prognosis: good    Goals  Plan Goals: Short Term Goals: 3 weeks  Patient will be able to tolerate initial exercises  Patient will have pain <5/10  Patient will be able to sleep without pain interruption   Patient will be able to stand/walk for >20 minutes without increased symptoms     Long Term Goals: 6 weeks  Patient will be independent in performing home exercise program.  Patient will have functional pain free left hip AROM  Patient will be able to stand and walk for 45 minutes without increased symptoms   Patient will be able to work a full shift without increased symptoms           Plan  Therapy options: will be seen for skilled therapy services  Planned modality interventions: ultrasound and iontophoresis  Planned therapy interventions: manual therapy, strengthening, stretching, therapeutic activities, home exercise program, joint mobilization and neuromuscular re-education  Frequency: 2x week  Duration in visits: 12  Duration in weeks: 6  Treatment plan discussed with: patient  Plan details: Access Code: [email or web address]  URL:  [email or web address]  Date: 12/02/2024  Prepared by: Eliza Desouza    Exercises  - Supine Piriformis Stretch with Foot on Ground  - 2 x daily - 7 x weekly - 1 sets - 3 reps - 20 sec hold  - Supine Figure 4 Piriformis Stretch  - 2 x daily - 7 x weekly - 1 sets - 3 reps - 20 sec hold  - Hooklying Single Knee to Chest Stretch  - 2 x daily - 7 x weekly - 1 sets - 3 reps - 20 sec hold  - Supine ITB Stretch with Strap  - 2 x daily - 7 x weekly - 1 sets - 3 reps - 20 sec hold        Manual Therapy:    10     mins  01591;  Therapeutic Exercise:    15     mins  91273;     Neuromuscular Veena:    0    mins  45424;    Therapeutic Activity:     5     mins  86410;     Ultrasound                  __8_  mins  00422  Iontophoresis                 0    mins  34883    Electrical Stimulation     0    mins  76651 ([id number])  Traction                         _0  mins  30656     Evaluation Time:     20  mins  Timed Treatment:   38   mins   Total Treatment:     60   mins    PT: Eliza Desouza PT     License Number: [place] [id number]  Electronically signed by Eliza Desouza PT, 12/02/24, 7:05 AM EST    Certification Period: 12/2/2024 thru 3/1/2025  I certify that the therapy services are furnished while this patient is under my care.  The services outlined above are required by this patient, and will be reviewed every 90 days.         Physician Signature:__________________________________________________    PHYSICIAN: Cass Burgos, ADELITA      DATE:     Please sign and return via fax to [email or web address] . Thank you, [facility] Physical Therapy.    PT SIGNATURE: Eliza Desouza PT   [place] LICENSE:  [id number]

## 2024-12-09 ENCOUNTER — TREATMENT (OUTPATIENT)
Dept: PHYSICAL THERAPY | Facility: CLINIC | Age: 79
End: 2024-12-09
Payer: MEDICARE

## 2024-12-09 DIAGNOSIS — M70.62 GREATER TROCHANTERIC BURSITIS OF LEFT HIP: ICD-10-CM

## 2024-12-09 DIAGNOSIS — M25.552 LEFT HIP PAIN: Primary | ICD-10-CM

## 2024-12-09 DIAGNOSIS — Z74.09 IMPAIRED FUNCTIONAL MOBILITY AND ACTIVITY TOLERANCE: ICD-10-CM

## 2024-12-09 PROCEDURE — 97035 APP MDLTY 1+ULTRASOUND EA 15: CPT | Performed by: PHYSICAL THERAPIST

## 2024-12-09 PROCEDURE — 97110 THERAPEUTIC EXERCISES: CPT | Performed by: PHYSICAL THERAPIST

## 2024-12-09 PROCEDURE — 97140 MANUAL THERAPY 1/> REGIONS: CPT | Performed by: PHYSICAL THERAPIST

## 2024-12-09 NOTE — PROGRESS NOTES
Physical Therapy Daily Treatment Note  12/9/2024  Visit Diagnoses:    ICD-10-CM ICD-9-CM   1. Left hip pain  M25.552 719.45   2. Greater trochanteric bursitis of left hip  M70.62 726.5   3. Impaired functional mobility and activity tolerance  Z74.09 V49.89       VISIT#: 2      Jerilyn Martinez reports: Her hip has been worse the last PT session. She has not done anything that she thinks would flare it up except standing on concrete at work - which she did on Saturday. She walks every day about 2-3 miles.It mostly hurts at night and wakes her up. It is sore during the day to push on her hip  Current Pain Level:    0/10; Worst:   7-8/10; Best:  0/10  Affected Area:  left lateral hip, lower back pops   Quality of Pain:  dull ache and discomfort   Functional Deficits/Irritating Factors: ambulation, movement, standing and sleeping   Progression: worsening  Compliance with HEP Reported: She does her exercises until she feels them.     Objective    Added to Program: Isometric hip abduction        See Exercise, Manual, and Modality Logs for complete treatment.     Patient Education: Pt was educated on exercise biomechanical correctness, intensity, and speed. Limiting excessive walking.     Assessment:  Jerilyn presented with worsening symptoms this morning. She has been on her feet a lot which is probably contributing to her increased symptoms. Her stretches do not appear to be aggravating her hip. Pt advised to limit walking distance for a time. Pt will continue to benefit from skilled PT interventions to address current functional deficits and impairments.     Plan Goals: Short Term Goals: 3 weeks  Patient will be able to tolerate initial exercises - MET  Patient will have pain <5/10  Patient will be able to sleep without pain interruption   Patient will be able to stand/walk for >20 minutes without increased symptoms      Long Term Goals: 6 weeks  Patient will be independent in performing home exercise program.  Patient will  have functional pain free left hip AROM  Patient will be able to stand and walk for 45 minutes without increased symptoms   Patient will be able to work a full shift without increased symptoms         Plan: Progress to/Continue with current program. Travis Dent      Timed:  Manual Therapy:            10_    mins  45745;  Ultrasound:                     10      mins  37254;   Therapeutic Exercise:    20     mins  73928;     Neuromuscular Veena:   0     mins  77850;    Therapeutic Activity:      0     mins  83447;      Iontophoresis              _0__   mins  Dry Needling               _0____   mins         Untimed:  Work Conditioning: __0__ mins 28235  Electrical Stimulation:    0    mins  23392 ( );  Mechanical Traction:       0        mins  47245;   Paraffin                       __0___  mins   Ice/Heat: __0__ mins      Timed Treatment:   40   mins   Total Treatment:     40   mins          VITALY Magaña License # [id number]  Physical Therapist Assistant

## 2024-12-11 ENCOUNTER — TREATMENT (OUTPATIENT)
Dept: PHYSICAL THERAPY | Facility: CLINIC | Age: 79
End: 2024-12-11
Payer: MEDICARE

## 2024-12-11 DIAGNOSIS — M25.552 LEFT HIP PAIN: Primary | ICD-10-CM

## 2024-12-11 DIAGNOSIS — M70.62 GREATER TROCHANTERIC BURSITIS OF LEFT HIP: ICD-10-CM

## 2024-12-11 PROCEDURE — 97033 APP MDLTY 1+IONTPHRSIS EA 15: CPT | Performed by: PHYSICAL THERAPIST

## 2024-12-11 PROCEDURE — 97110 THERAPEUTIC EXERCISES: CPT | Performed by: PHYSICAL THERAPIST

## 2024-12-11 PROCEDURE — 97140 MANUAL THERAPY 1/> REGIONS: CPT | Performed by: PHYSICAL THERAPIST

## 2024-12-11 NOTE — PROGRESS NOTES
Physical Therapy Daily Treatment Note  [facility] Physical Therapy [facility]  [address]  [place] [address]    Patient: Jerilyn Martinez   : 1945  Referring practitioner: ADELITA Martins  Today's Date: 2024  Patient seen for 3 sessions    Visit Diagnoses:    ICD-10-CM ICD-9-CM   1. Left hip pain  M25.552 719.45   2. Greater trochanteric bursitis of left hip  M70.62 726.5       Subjective   Jerilyn Martinez reports: that she is more sore in the posterior lateral hip since starting PT.        Objective   Tenderness left lateral hip - greater trochanteric region and posterior mm    Tenderness in ITB as well    See Exercise, Manual, and Modality Logs for complete treatment.     Patient Education:stretch w/exericse but avoid pain.  Discussed use of ionto for hip pain relief    Assessment/Plan  Added Ionto to her treatment plan today due to additional irritation in the greater trochanteric region.  Discussed use of pillow between knees when sleeping to relief stress on the hip.  Did not add any exercises today due to flare up of symptoms     Progress per Plan of Care           Timed:  Manual Therapy:    10     mins  41234;  Therapeutic Exercise:    10     mins  04489;     Neuromuscular Veena:    0    mins  25716;    Therapeutic Activity:     10    mins  19488;     Ultrasound:      0/8     mins  30044;    Iontophoresis              __8_   mins  Dry Needling               _____   mins        Untimed:  Electrical Stimulation:     0    mins  66740 (MC );  Mechanical Traction:             mins  59034;   Paraffin                       _____  mins     Timed Treatment:   38   mins   Total Treatment:     50   mins    Eliza Desouza, PT  [place] License # [id number]  Physical Therapist    Electronically signed by Eliza Desouza, PT, 24, 4:13 PM EST

## 2024-12-18 ENCOUNTER — TREATMENT (OUTPATIENT)
Dept: PHYSICAL THERAPY | Facility: CLINIC | Age: 79
End: 2024-12-18
Payer: MEDICARE

## 2024-12-18 DIAGNOSIS — M70.62 GREATER TROCHANTERIC BURSITIS OF LEFT HIP: ICD-10-CM

## 2024-12-18 DIAGNOSIS — M25.552 LEFT HIP PAIN: Primary | ICD-10-CM

## 2024-12-18 DIAGNOSIS — Z74.09 IMPAIRED FUNCTIONAL MOBILITY AND ACTIVITY TOLERANCE: ICD-10-CM

## 2024-12-18 NOTE — PROGRESS NOTES
Physical Therapy Daily Treatment Note  [facility] Physical Therapy [facility]  [address], [address]  [place] [address]    Patient: Jerilyn Martinez   : 1945  Referring practitioner: ADELITA Martins  Today's Date: 2024  Patient seen for 4 sessions    Visit Diagnoses:    ICD-10-CM ICD-9-CM   1. Left hip pain  M25.552 719.45   2. Greater trochanteric bursitis of left hip  M70.62 726.5   3. Impaired functional mobility and activity tolerance  Z74.09 V49.89       Subjective   Jerilyn Martinez reports: that her hip as been some better since her last session.  Thinks that the ionto patch was helpful.  Hip has not been waking her up every night but it did last night as she was working yesterday.        Objective   Presents walking with a symmetrical gait pattern    Tenderness posterior aspect of the left greater trochanter    See Exercise, Manual, and Modality Logs for complete treatment.     Patient Education: cont stretches    Assessment/Plan  Relief from last session has lasted all week but not full relief as it was for a few days.  Able to perform clam shell and sidestepping today without pain.  Very compliant with program    Progress strengthening /stabilization /functional activity           Timed:  Manual Therapy:    10     mins  28943;  Therapeutic Exercise:    15     mins  71823;     Neuromuscular Veena:    0    mins  08490;    Therapeutic Activity:     0     mins  70222;     Ultrasound:      8     mins  43577;    Iontophoresis              __8_   mins  Dry Needling               _____   mins        Untimed:  Electrical Stimulation:     0    mins  20295 ( );  Mechanical Traction:             mins  86575;   Paraffin                       _____  mins     Timed Treatment:   41   mins   Total Treatment:     60   mins    Eliza Desouza, PT  [place] License # [id number]  Physical Therapist    Electronically signed by Eliza Desouza, PT, 24, 9:03 AM EST

## 2024-12-19 NOTE — PROGRESS NOTES
Physical Therapy Daily Treatment Note  12/20/2024  Visit Diagnoses:    ICD-10-CM ICD-9-CM   1. Left hip pain  M25.552 719.45   2. Greater trochanteric bursitis of left hip  M70.62 726.5   3. Impaired functional mobility and activity tolerance  Z74.09 V49.89       VISIT#: 5      Jerilyn Martinez reports: Her hip is tender like it has been. She worked yesterday and was up on it all day. It is not as bad as it was when she started PT. Her hips pain is worse at night and wakes her upl   Current Pain Level:    4-5/10; Worst:   7-8/10 at night; Best:  0/10  Affected Area:  left lateral hip, lower back pops   Quality of Pain:  dull ache and discomfort   Functional Deficits/Irritating Factors: ambulation, movement, standing and sleeping   Progression: improving overall  Compliance with HEP Reported: YES      Objective  Presents: Bruising/Petechia on L GT  Increased sets/reps of:  Iso Hip abduction   Increased resistance on:  none  Added to Program: lizz          See Exercise, Manual, and Modality Logs for complete treatment.      Patient Education: Pt was educated on exercise biomechanical correctness, intensity, and speed.      Assessment:  Jerilyn has been making some progress but when she works long days, her pain returns although not as severe as it has been. Good compliance with her HEP. Minimal cuing required during session.  Pt will continue to benefit from skilled PT interventions to address current functional deficits and impairments.      Patient will be able to tolerate initial exercises - MET  Patient will have pain <5/10  Patient will be able to sleep without pain interruption   Patient will be able to stand/walk for >20 minutes without increased symptoms      Long Term Goals: 6 weeks  Patient will be independent in performing home exercise program.  Patient will have functional pain free left hip AROM  Patient will be able to stand and walk for 45 minutes without increased symptoms   Patient will be able to  work a full shift without increased symptoms            Plan: Progress to/Continue with current program.         Timed:  Manual Therapy:            15_    mins  30283;  Ultrasound:                     10      mins  89726;   Therapeutic Exercise:    10     mins  34629;     Neuromuscular Veena:   0     mins  80050;    Therapeutic Activity:      0     mins  97321;      Iontophoresis              _15__   mins  Dry Needling               _0____   mins         Untimed:  Work Conditioning: __0__ mins 18096  Electrical Stimulation:    0    mins  34823 ( );  Mechanical Traction:       0        mins  14633;   Paraffin                       __0___  mins   Ice/Heat: __0__ mins        Timed Treatment:   50   mins   Total Treatment:     50   mins

## 2024-12-20 ENCOUNTER — TREATMENT (OUTPATIENT)
Dept: PHYSICAL THERAPY | Facility: CLINIC | Age: 79
End: 2024-12-20
Payer: MEDICARE

## 2024-12-20 DIAGNOSIS — Z74.09 IMPAIRED FUNCTIONAL MOBILITY AND ACTIVITY TOLERANCE: ICD-10-CM

## 2024-12-20 DIAGNOSIS — M70.62 GREATER TROCHANTERIC BURSITIS OF LEFT HIP: ICD-10-CM

## 2024-12-20 DIAGNOSIS — M25.552 LEFT HIP PAIN: Primary | ICD-10-CM

## 2024-12-23 ENCOUNTER — TREATMENT (OUTPATIENT)
Dept: PHYSICAL THERAPY | Facility: CLINIC | Age: 79
End: 2024-12-23
Payer: MEDICARE

## 2024-12-23 DIAGNOSIS — M25.552 LEFT HIP PAIN: Primary | ICD-10-CM

## 2024-12-23 DIAGNOSIS — Z74.09 IMPAIRED FUNCTIONAL MOBILITY AND ACTIVITY TOLERANCE: ICD-10-CM

## 2024-12-23 DIAGNOSIS — M70.62 GREATER TROCHANTERIC BURSITIS OF LEFT HIP: ICD-10-CM

## 2024-12-23 PROCEDURE — 97033 APP MDLTY 1+IONTPHRSIS EA 15: CPT | Performed by: PHYSICAL THERAPIST

## 2024-12-23 PROCEDURE — 97140 MANUAL THERAPY 1/> REGIONS: CPT | Performed by: PHYSICAL THERAPIST

## 2024-12-23 PROCEDURE — 97035 APP MDLTY 1+ULTRASOUND EA 15: CPT | Performed by: PHYSICAL THERAPIST

## 2024-12-23 NOTE — PROGRESS NOTES
Physical Therapy Daily Treatment Note  [facility] Physical Therapy [facility]  [address]  [place] [address]    Patient: Jerilyn Martinez   : 1945  Referring practitioner: ADELITA Martins  Today's Date: 2024  Patient seen for 6 sessions    Visit Diagnoses:    ICD-10-CM ICD-9-CM   1. Left hip pain  M25.552 719.45   2. Greater trochanteric bursitis of left hip  M70.62 726.5   3. Impaired functional mobility and activity tolerance  Z74.09 V49.89       Subjective   Jerilyn Martinez reports: that she had more pain over the past 24 hours of unknown origin.  Had been sleeping well until last night.  Walked for 4 miles yesterday but not all at the same time. States that she prefers to sleep on the left hip and wakes up that way many mornings.          Objective   Some skin irritation from 16 hour ionto patch removal    Tenderness in left greater trochanteric area    Symmetrical gait pattern    See Exercise, Manual, and Modality Logs for complete treatment.     Patient Education: cont to stretch    Assessment/Plan  Used 15 minute ionto today due to skin irritation from other patch.  HIp motion has increased with therapy but did have pain again in left hip last night waking her up from sleep.  In general the hip pain is not as pain as it had been intiially.  If still symptoamtic next week will suggest she see MD for further treatment.     Progress strengthening /stabilization /functional activity           Timed:  Manual Therapy:    15     mins  56208;  Therapeutic Exercise:    7     mins  27906;     Neuromuscular Veena:    0    mins  87556;    Therapeutic Activity:     0     mins  33707;     Ultrasound:      8     mins  97604;    Iontophoresis              __8_   mins  Dry Needling               _____   mins        Untimed:  Electrical Stimulation:     0    mins  31688 ( );  Mechanical Traction:             mins  24723;   Paraffin                       _____  mins      Timed Treatment:   38   mins   Total Treatment:     40   mins    Eliza Desouza, PT  [place] License # [id number]  Physical Therapist    Electronically signed by Eliza Desouza PT, 12/23/24, 2:58 PM EST

## 2024-12-30 ENCOUNTER — TREATMENT (OUTPATIENT)
Dept: PHYSICAL THERAPY | Facility: CLINIC | Age: 79
End: 2024-12-30
Payer: MEDICARE

## 2024-12-30 DIAGNOSIS — Z74.09 IMPAIRED FUNCTIONAL MOBILITY AND ACTIVITY TOLERANCE: ICD-10-CM

## 2024-12-30 DIAGNOSIS — M70.62 GREATER TROCHANTERIC BURSITIS OF LEFT HIP: ICD-10-CM

## 2024-12-30 DIAGNOSIS — M25.552 LEFT HIP PAIN: Primary | ICD-10-CM

## 2024-12-30 NOTE — PROGRESS NOTES
Physical Therapy Daily Treatment Note  [facility] Physical Therapy [facility]  [address], [address]  [place] [address]    Patient: Jerilyn Martinez   : 1945  Referring practitioner: ADELITA Martins  Today's Date: 2024  Patient seen for 7 sessions    Visit Diagnoses:    ICD-10-CM ICD-9-CM   1. Left hip pain  M25.552 719.45   2. Greater trochanteric bursitis of left hip  M70.62 726.5   3. Impaired functional mobility and activity tolerance  Z74.09 V49.89       Subjective   Jerilyn Martinez reports: that she has had more good days than bad days lately.  Was able to walk for 3 miles yesterday but did have some mild ache in the hip afterwards.  Has continued to do her stretches daily.  Pain is less frequent and less intense.  Pain still occasionally up to 8/10 but it does not last as long.      Objective   Presents with a normal gait pattern    Tenderness in left lateral hip, slight tenderness in ITB    See Exercise, Manual, and Modality Logs for complete treatment.     Patient Education: cont HEP    Assessment/Plan  Jerilyn is much better than she was initially.  Still does have some tenderness in the hip but not nearly as noted.  Able to walk for longer periods of time without pain but still not as long as desired.    Progress strengthening /stabilization /functional activity           Timed:  Manual Therapy:    15     mins  94284;  Therapeutic Exercise:    0     mins  07765;     Neuromuscular Veena:    0    mins  23648;    Therapeutic Activity:     0     mins  50124;     Ultrasound:      8     mins  66889;    Iontophoresis              __05_   mins  Dry Needling               _____   mins        Untimed:  Electrical Stimulation:     0    mins  78720 ( );  Mechanical Traction:             mins  32579;   Paraffin                       _____  mins     Timed Treatment:   38   mins   Total Treatment:     45   mins    Eliza Desouza, PT  [place] License # [id number]  Physical  Therapist    Electronically signed by Eliza Desouza, PT, 12/30/24, 2:26 PM EST

## 2025-01-03 ENCOUNTER — TREATMENT (OUTPATIENT)
Dept: PHYSICAL THERAPY | Facility: CLINIC | Age: 80
End: 2025-01-03
Payer: MEDICARE

## 2025-01-03 DIAGNOSIS — M70.62 GREATER TROCHANTERIC BURSITIS OF LEFT HIP: ICD-10-CM

## 2025-01-03 DIAGNOSIS — M25.552 LEFT HIP PAIN: Primary | ICD-10-CM

## 2025-01-03 DIAGNOSIS — Z74.09 IMPAIRED FUNCTIONAL MOBILITY AND ACTIVITY TOLERANCE: ICD-10-CM

## 2025-01-03 NOTE — PROGRESS NOTES
Physical Therapy Daily Treatment Note  1/3/2025  Visit Diagnoses:    ICD-10-CM ICD-9-CM   1. Left hip pain  M25.552 719.45   2. Greater trochanteric bursitis of left hip  M70.62 726.5   3. Impaired functional mobility and activity tolerance  Z74.09 V49.89       VISIT#: 8      Jerilyn Martinez reports: Her hip is ok. She has had a few nights where it hurts. She can get comfortable now where as before she could not. She is better than she was.   Current Pain Level:    4-5/10; Worst:   7-8/10 at night; Best:  0/10  Affected Area:  left lateral hip, lower back pops   Quality of Pain:  dull ache and discomfort   Functional Deficits/Irritating Factors: ambulation, movement, standing and sleeping   Progression: improving overall  Compliance with HEP Reported: Yes    Objective      Added to Program: scifit, SLS    Tenderness in left lateral hip, slight tenderness in ITB       See Exercise, Manual, and Modality Logs for complete treatment.     Patient Education: Pt was educated on exercise biomechanical correctness, intensity, and speed. Advised to limit/decrease the amount of walking she is doing as it is probable irritating her bursa. Advised to cross train and do some non weight bearing cardio like nu stepper.     Assessment:  Jerilyn is gradually getting better in her L hip. She did well with her exercises and did not require a lot of cuing. Remains tender around L GT and some in IT band. Sleeping better and night time pain is less frequent but is as intense when it occurs. Did advise her to cut back on the amount of walking she is doing to something less irritating to her hip. She v/u.  Pt will continue to benefit from skilled PT interventions to address current functional deficits and impairments.     Patient will be able to tolerate initial exercises - MET  Patient will have pain <5/10  Patient will be able to sleep without pain interruption   Patient will be able to stand/walk for >20 minutes without increased symptoms       Long Term Goals: 6 weeks  Patient will be independent in performing home exercise program.  Patient will have functional pain free left hip AROM  Patient will be able to stand and walk for 45 minutes without increased symptoms   Patient will be able to work a full shift without increased symptoms        Plan: Progress to/Continue with current program.       Timed:  Manual Therapy:            0_    mins  61917;  Ultrasound:                     10      mins  41335;   Therapeutic Exercise:    20     mins  59418;     Neuromuscular Veena:   10     mins  69477;    Therapeutic Activity:      0     mins  28354;      Iontophoresis              _20__   mins  Dry Needling               _0____   mins         Untimed:  Work Conditioning: __0__ mins 14891  Electrical Stimulation:    0    mins  62323 ( );  Mechanical Traction:       0        mins  37028;   Paraffin                       __0___  mins   Ice/Heat: __0__ mins      Timed Treatment:   60   mins   Total Treatment:     60   mins          Sydney London PTA  [place] License # [id number]  Physical Therapist Assistant

## 2025-01-13 ENCOUNTER — TELEPHONE (OUTPATIENT)
Dept: ORTHOPEDIC SURGERY | Facility: CLINIC | Age: 80
End: 2025-01-13

## 2025-01-13 NOTE — TELEPHONE ENCOUNTER
Hub staff attempted to follow warm transfer process and was unsuccessful     Caller: Jerilyn Martinez    Relationship to patient: Self    Best call back number: [phone]    Patient is needing: PT CALLING TO SCHEDULE L HIP FOLLOW UP WITH OBIE COLUNGAKIN FOR INJECTION. 1ST AVAILABLE APRIL 1. PT WAS LAST SEEN IN NOV 2024, PT STATES THAT VISIT STATES R HIP BUT IT WAS ACTUALLY FOR L HIP. PLEASE CONTACT PT TO SCHEDULE.

## 2025-01-17 ENCOUNTER — OFFICE VISIT (OUTPATIENT)
Dept: ORTHOPEDIC SURGERY | Facility: CLINIC | Age: 80
End: 2025-01-17
Payer: MEDICARE

## 2025-01-17 VITALS — WEIGHT: 145.1 LBS | HEIGHT: 65 IN | TEMPERATURE: 98 F | BODY MASS INDEX: 24.18 KG/M2

## 2025-01-17 DIAGNOSIS — M70.62 TROCHANTERIC BURSITIS OF LEFT HIP: Primary | ICD-10-CM

## 2025-01-17 RX ORDER — METHYLPREDNISOLONE ACETATE 80 MG/ML
80 INJECTION, SUSPENSION INTRA-ARTICULAR; INTRALESIONAL; INTRAMUSCULAR; SOFT TISSUE
Status: COMPLETED | OUTPATIENT
Start: 2025-01-17 | End: 2025-01-17

## 2025-01-17 RX ADMIN — METHYLPREDNISOLONE ACETATE 80 MG: 80 INJECTION, SUSPENSION INTRA-ARTICULAR; INTRALESIONAL; INTRAMUSCULAR; SOFT TISSUE at 14:21

## 2025-01-17 NOTE — PROGRESS NOTES
1/17/2025    Jerilyn Martinez is here today for lateral hip pain. Pt has undergone injection of the hip bursa in the past with good resolution of symptoms. Pt is requesting a repeat injection.     Hip Bursa Injection Procedure Note:    Large Joint Arthrocentesis: L greater trochanteric bursa  Date/Time: 1/17/2025 2:21 PM  Consent given by: patient  Site marked: site marked  Timeout: Immediately prior to procedure a time out was called to verify the correct patient, procedure, equipment, support staff and site/side marked as required   Supporting Documentation  Indications: pain   Procedure Details  Location: hip - L greater trochanteric bursa  Needle size: 22 G  Approach: lateral  Medications administered: 80 mg methylPREDNISolone acetate 80 MG/ML; 2 mL lidocaine (cardiac)      At the conclusion of the injection I discussed the importance of continued fascial stretching on a daily basis. I will see the patient on a PRN basis or if the symptoms should fail to improve or worsen.    Cass Burgos, APRN  1/17/2025

## 2025-01-22 ENCOUNTER — APPOINTMENT (OUTPATIENT)
Dept: WOMENS IMAGING | Facility: HOSPITAL | Age: 80
End: 2025-01-22
Payer: MEDICARE

## 2025-01-22 PROCEDURE — 77063 BREAST TOMOSYNTHESIS BI: CPT | Performed by: RADIOLOGY

## 2025-01-22 PROCEDURE — 77067 SCR MAMMO BI INCL CAD: CPT | Performed by: RADIOLOGY

## 2025-02-17 DIAGNOSIS — G89.29 CHRONIC PAIN OF LEFT KNEE: ICD-10-CM

## 2025-02-17 DIAGNOSIS — M25.562 CHRONIC PAIN OF LEFT KNEE: ICD-10-CM

## 2025-02-17 RX ORDER — MELOXICAM 7.5 MG/1
7.5 TABLET ORAL DAILY
Qty: 90 TABLET | Refills: 0 | Status: SHIPPED | OUTPATIENT
Start: 2025-02-17

## 2025-03-13 ENCOUNTER — TRANSCRIBE ORDERS (OUTPATIENT)
Dept: ADMINISTRATIVE | Facility: HOSPITAL | Age: 80
End: 2025-03-13
Payer: MEDICARE

## 2025-03-13 DIAGNOSIS — R10.11 ABDOMINAL PAIN, RIGHT UPPER QUADRANT: Primary | ICD-10-CM

## 2025-03-26 ENCOUNTER — HOSPITAL ENCOUNTER (OUTPATIENT)
Dept: ULTRASOUND IMAGING | Facility: HOSPITAL | Age: 80
Discharge: HOME | End: 2025-03-26
Admitting: INTERNAL MEDICINE
Payer: MEDICARE

## 2025-03-26 DIAGNOSIS — R10.11 ABDOMINAL PAIN, RIGHT UPPER QUADRANT: ICD-10-CM

## 2025-03-26 PROCEDURE — 76705 ECHO EXAM OF ABDOMEN: CPT

## 2025-04-21 ENCOUNTER — OFFICE VISIT (OUTPATIENT)
Dept: ORTHOPEDIC SURGERY | Facility: CLINIC | Age: 80
End: 2025-04-21
Payer: MEDICARE

## 2025-04-21 VITALS — WEIGHT: 145.9 LBS | TEMPERATURE: 97.1 F | HEIGHT: 66 IN | BODY MASS INDEX: 23.45 KG/M2

## 2025-04-21 DIAGNOSIS — M70.62 TROCHANTERIC BURSITIS OF LEFT HIP: Primary | ICD-10-CM

## 2025-04-21 PROCEDURE — 20610 DRAIN/INJ JOINT/BURSA W/O US: CPT | Performed by: NURSE PRACTITIONER

## 2025-04-21 RX ORDER — METHYLPREDNISOLONE ACETATE 80 MG/ML
80 INJECTION, SUSPENSION INTRA-ARTICULAR; INTRALESIONAL; INTRAMUSCULAR; SOFT TISSUE
Status: COMPLETED | OUTPATIENT
Start: 2025-04-21 | End: 2025-04-21

## 2025-04-21 RX ADMIN — METHYLPREDNISOLONE ACETATE 80 MG: 80 INJECTION, SUSPENSION INTRA-ARTICULAR; INTRALESIONAL; INTRAMUSCULAR; SOFT TISSUE at 11:41

## 2025-04-21 NOTE — PROGRESS NOTES
4/21/2025    Jerilyn Martinez is here today for lateral hip pain. Pt has undergone injection of the hip bursa in the past with good resolution of symptoms. Pt is requesting a repeat injection.     Hip Bursa Injection Procedure Note:    Large Joint Arthrocentesis: L greater trochanteric bursa  Date/Time: 4/21/2025 11:41 AM  Consent given by: patient  Site marked: site marked  Timeout: Immediately prior to procedure a time out was called to verify the correct patient, procedure, equipment, support staff and site/side marked as required   Supporting Documentation  Indications: pain   Procedure Details  Location: hip - L greater trochanteric bursa  Needle size: 22 G  Approach: lateral  Medications administered: 80 mg methylPREDNISolone acetate 80 MG/ML; 2 mL lidocaine (cardiac)      At the conclusion of the injection I discussed the importance of continued fascial stretching on a daily basis. I will see the patient on a PRN basis or if the symptoms should fail to improve or worsen.    Cass Burgos, APRN  4/21/2025

## 2025-05-13 ENCOUNTER — ON CAMPUS - OUTPATIENT (OUTPATIENT)
Dept: URBAN - METROPOLITAN AREA HOSPITAL 114 | Facility: HOSPITAL | Age: 80
End: 2025-05-13
Payer: MEDICARE

## 2025-05-13 ENCOUNTER — ANESTHESIA EVENT (OUTPATIENT)
Dept: GASTROENTEROLOGY | Facility: HOSPITAL | Age: 80
End: 2025-05-13
Payer: MEDICARE

## 2025-05-13 ENCOUNTER — ANESTHESIA (OUTPATIENT)
Dept: GASTROENTEROLOGY | Facility: HOSPITAL | Age: 80
End: 2025-05-13
Payer: MEDICARE

## 2025-05-13 ENCOUNTER — HOSPITAL ENCOUNTER (OUTPATIENT)
Facility: HOSPITAL | Age: 80
Setting detail: HOSPITAL OUTPATIENT SURGERY
Discharge: HOME | End: 2025-05-13
Attending: INTERNAL MEDICINE | Admitting: INTERNAL MEDICINE
Payer: MEDICARE

## 2025-05-13 VITALS
SYSTOLIC BLOOD PRESSURE: 139 MMHG | OXYGEN SATURATION: 100 % | BODY MASS INDEX: 22.5 KG/M2 | RESPIRATION RATE: 16 BRPM | WEIGHT: 140 LBS | HEART RATE: 80 BPM | DIASTOLIC BLOOD PRESSURE: 96 MMHG | HEIGHT: 66 IN

## 2025-05-13 DIAGNOSIS — Z12.11 ENCOUNTER FOR SCREENING FOR MALIGNANT NEOPLASM OF COLON: ICD-10-CM

## 2025-05-13 DIAGNOSIS — K57.30 DIVERTICULOSIS OF LARGE INTESTINE WITHOUT PERFORATION OR ABS: ICD-10-CM

## 2025-05-13 PROCEDURE — 25010000002 LIDOCAINE 2% SOLUTION: Performed by: NURSE ANESTHETIST, CERTIFIED REGISTERED

## 2025-05-13 PROCEDURE — 25810000003 LACTATED RINGERS PER 1000 ML: Performed by: NURSE ANESTHETIST, CERTIFIED REGISTERED

## 2025-05-13 PROCEDURE — 25010000002 PROPOFOL 200 MG/20ML EMULSION: Performed by: NURSE ANESTHETIST, CERTIFIED REGISTERED

## 2025-05-13 PROCEDURE — 25010000002 PROPOFOL 1000 MG/100ML EMULSION: Performed by: NURSE ANESTHETIST, CERTIFIED REGISTERED

## 2025-05-13 PROCEDURE — G0121 COLON CA SCRN NOT HI RSK IND: HCPCS | Performed by: INTERNAL MEDICINE

## 2025-05-13 PROCEDURE — 25810000003 LACTATED RINGERS PER 1000 ML: Performed by: INTERNAL MEDICINE

## 2025-05-13 RX ORDER — PROPOFOL 10 MG/ML
INJECTION, EMULSION INTRAVENOUS CONTINUOUS PRN
Status: DISCONTINUED | OUTPATIENT
Start: 2025-05-13 | End: 2025-05-13 | Stop reason: SURG

## 2025-05-13 RX ORDER — SODIUM CHLORIDE, SODIUM LACTATE, POTASSIUM CHLORIDE, CALCIUM CHLORIDE 600; 310; 30; 20 MG/100ML; MG/100ML; MG/100ML; MG/100ML
INJECTION, SOLUTION INTRAVENOUS CONTINUOUS PRN
Status: DISCONTINUED | OUTPATIENT
Start: 2025-05-13 | End: 2025-05-13 | Stop reason: SURG

## 2025-05-13 RX ORDER — LIDOCAINE HYDROCHLORIDE 20 MG/ML
INJECTION, SOLUTION INFILTRATION; PERINEURAL AS NEEDED
Status: DISCONTINUED | OUTPATIENT
Start: 2025-05-13 | End: 2025-05-13 | Stop reason: SURG

## 2025-05-13 RX ORDER — PROPOFOL 10 MG/ML
INJECTION, EMULSION INTRAVENOUS AS NEEDED
Status: DISCONTINUED | OUTPATIENT
Start: 2025-05-13 | End: 2025-05-13 | Stop reason: SURG

## 2025-05-13 RX ORDER — SODIUM CHLORIDE, SODIUM LACTATE, POTASSIUM CHLORIDE, CALCIUM CHLORIDE 600; 310; 30; 20 MG/100ML; MG/100ML; MG/100ML; MG/100ML
30 INJECTION, SOLUTION INTRAVENOUS CONTINUOUS PRN
Status: DISCONTINUED | OUTPATIENT
Start: 2025-05-13 | End: 2025-05-13 | Stop reason: HOSPADM

## 2025-05-13 RX ADMIN — PROPOFOL 140 MCG/KG/MIN: 10 INJECTION, EMULSION INTRAVENOUS at 11:14

## 2025-05-13 RX ADMIN — PROPOFOL INJECTABLE EMULSION 70 MG: 10 INJECTION, EMULSION INTRAVENOUS at 11:14

## 2025-05-13 RX ADMIN — LIDOCAINE HYDROCHLORIDE 60 MG: 20 INJECTION, SOLUTION INFILTRATION; PERINEURAL at 11:14

## 2025-05-13 RX ADMIN — SODIUM CHLORIDE, POTASSIUM CHLORIDE, SODIUM LACTATE AND CALCIUM CHLORIDE: 600; 310; 30; 20 INJECTION, SOLUTION INTRAVENOUS at 11:12

## 2025-05-13 RX ADMIN — SODIUM CHLORIDE, POTASSIUM CHLORIDE, SODIUM LACTATE AND CALCIUM CHLORIDE 30 ML/HR: 600; 310; 30; 20 INJECTION, SOLUTION INTRAVENOUS at 10:16

## 2025-05-13 NOTE — ANESTHESIA PREPROCEDURE EVALUATION
Anesthesia Evaluation     Patient summary reviewed and Nursing notes reviewed   history of anesthetic complications:  Prolonged sedation  NPO Solid Status: > 8 hours  NPO Liquid Status: > 4 hours           Airway   Mallampati: II  Neck ROM: full  No difficulty expected  Dental - normal exam     Pulmonary     breath sounds clear to auscultation  (+) a smoker Former, lung cancer, COPD,  Cardiovascular     Rhythm: regular    (+) valvular problems/murmurs murmur, dysrhythmias Atrial Flutter, hyperlipidemia      Neuro/Psych  GI/Hepatic/Renal/Endo    (+) GERD, thyroid problem     Musculoskeletal     Abdominal    Substance History      OB/GYN          Other   arthritis,   history of cancer                  Anesthesia Plan    ASA 3     MAC     intravenous induction     Anesthetic plan, risks, benefits, and alternatives have been provided, discussed and informed consent has been obtained with: patient.    CODE STATUS:

## 2025-05-13 NOTE — H&P
[facility]   HISTORY AND PHYSICAL    Patient Name: Jerilyn Martinez  : 1945  MRN: [id number]  Primary Care Physician:  Felisha Baez MD  Date of admission: 2025    Subjective   Subjective     Chief Complaint: screening for colon cancer     History of Present Illness  The patient presents with no gastrointestinal  Symptoms or issues at this time   Review of Systems   All other systems reviewed and are negative.       Personal History     Past Medical History:   Diagnosis Date    Arthritis     Arthritis of back     Disease of thyroid gland     HYPOTHYROIDISM    GERD (gastroesophageal reflux disease)     High cholesterol     History of bilateral breast implants     Iron deficiency anemia     Knee swelling     Lumbar stenosis     AND CERVICAL    Lung cancer 2017    RIGHT UPPER LOBE, HAD SURGERY    Murmur, heart     Periodic heart flutter     2O PLUS YEARS AGO    Slow to wake up after anesthesia     Spinal stenosis     Wears contact lenses     Wrist sprain        Past Surgical History:   Procedure Laterality Date    APPENDECTOMY      BUNIONECTOMY      X 2    HYSTERECTOMY      LASIK      THORACOSCOPY Right 2017    Procedure: BRONCHOSCOPY, RIGHT VAT, ROBOT ASSISTED RIGHT UPPER LOBECTOMY, INTERCOSTAL NERVE BLOCK WITH CRYOA;  Surgeon: Yuri Brizuela III, MD;  Location: [facility];  Service:     THYROID SURGERY      nodule removed    TONSILLECTOMY AND ADENOIDECTOMY      TOTAL KNEE ARTHROPLASTY Right 2021    Procedure: TOTAL KNEE ARTHROPLASTY;  Surgeon: Monroe Vela MD;  Location: [facility];  Service: Orthopedics;  Laterality: Right;       Family History: family history includes Aneurysm in her mother; Cancer in her father; Lung cancer in her father, mother, and sister. Otherwise pertinent FHx was reviewed and not pertinent to current issue.    Social History:  reports that she quit smoking about 38 years ago. Her smoking use included cigarettes. She started smoking about 58 years  ago. She has a 40 pack-year smoking history. She has never used smokeless tobacco. She reports current alcohol use. She reports that she does not use drugs.    Home Medications:  Calcium Carbonate-Vitamin D, Multiple Vitamins-Minerals, cephalexin, levothyroxine, meloxicam, omeprazole, and simvastatin    Allergies:  Allergies   Allergen Reactions    Codeine Shortness Of Breath     CHEST PAIN;  Patient has taken Lortab/Norco in the past with no problem          Objective    Objective     Vitals:   Heart Rate:  [92] 92  Resp:  [16] 16  BP: (156)/(88) 156/88    Physical Exam  HENT:      Right Ear: External ear normal.      Left Ear: External ear normal.      Mouth/Throat:      Pharynx: Oropharynx is clear.   Eyes:      Conjunctiva/sclera: Conjunctivae normal.   Cardiovascular:      Rate and Rhythm: Normal rate.      Pulses: Normal pulses.   Pulmonary:      Effort: Pulmonary effort is normal.   Abdominal:      General: Abdomen is flat.   Skin:     General: Skin is warm and dry.   Neurological:      General: No focal deficit present.      Mental Status: She is alert.   Psychiatric:         Mood and Affect: Mood normal.         Result Review    Result Review:  I have personally reviewed the results from the time of this admission to 5/13/2025 11:14 EDT and agree with these findings:  []  Laboratory list / accordion  []  Microbiology  []  Radiology  []  EKG/Telemetry   []  Cardiology/Vascular   []  Pathology  []  Old records  []  Other:  Most notable findings include:       Assessment & Plan   Assessment / Plan     Brief Patient Summary:  Jerilyn Martinez is a 80 y.o. female who   Age related colon cancer screening    Active Hospital Problems:  There are no active hospital problems to display for this patient.    Plan: Colonoscopy risks, alternatives and benefits discussed with patient and the patient is agreeable to having procedure done.      VTE Prophylaxis:  No VTE prophylaxis order currently exists.        CODE STATUS:        Admission Status:  I believe this patient meets outpatient  status.    Sae Kahn MD

## 2025-05-13 NOTE — ANESTHESIA POSTPROCEDURE EVALUATION
"Patient: Jerilyn Martinez    Procedure Summary       Date: 05/13/25 Room / Location:  [facility]    Anesthesia Start: 1112 Anesthesia Stop: 1136    Procedure: COLONOSCOPY to cecum and ti Diagnosis:     Surgeons: Sae Kahn MD Provider: Jean-Paul Olivier MD    Anesthesia Type: MAC ASA Status: 3            Anesthesia Type: MAC    Vitals  Vitals Value Taken Time   /96 05/13/25 12:02   Temp     Pulse 78 05/13/25 12:03   Resp 16 05/13/25 12:02   SpO2 100 % 05/13/25 12:02   Vitals shown include unfiled device data.        Post Anesthesia Care and Evaluation    Patient location during evaluation: bedside  Patient participation: complete - patient participated  Level of consciousness: sleepy but conscious  Pain score: 0  Pain management: adequate    Airway patency: patent  Anesthetic complications: No anesthetic complications    Cardiovascular status: acceptable  Respiratory status: acceptable  Hydration status: acceptable    Comments: /96 (BP Location: Left arm, Patient Position: Lying)   Pulse 80   Resp 16   Ht 166.4 cm (65.5\")   Wt 63.5 kg (140 lb)   SpO2 100%   BMI 22.94 kg/m²       "

## 2025-05-26 DIAGNOSIS — G89.29 CHRONIC PAIN OF LEFT KNEE: ICD-10-CM

## 2025-05-26 DIAGNOSIS — M25.562 CHRONIC PAIN OF LEFT KNEE: ICD-10-CM

## 2025-05-27 RX ORDER — MELOXICAM 7.5 MG/1
7.5 TABLET ORAL DAILY
Qty: 30 TABLET | Refills: 0 | Status: SHIPPED | OUTPATIENT
Start: 2025-05-27

## 2025-06-27 DIAGNOSIS — M25.562 CHRONIC PAIN OF LEFT KNEE: ICD-10-CM

## 2025-06-27 DIAGNOSIS — G89.29 CHRONIC PAIN OF LEFT KNEE: ICD-10-CM

## 2025-06-27 RX ORDER — MELOXICAM 7.5 MG/1
TABLET ORAL
Qty: 30 TABLET | Refills: 0 | Status: SHIPPED | OUTPATIENT
Start: 2025-06-27

## 2025-06-27 NOTE — TELEPHONE ENCOUNTER
Please advise   [phone]    I did call and confirmed RE request with pt. Pt is asking for RF to take with her out of town.     Last FD 05/27/2025  Last OV 04/21/2025  Next OV N/A

## 2025-08-04 DIAGNOSIS — G89.29 CHRONIC PAIN OF LEFT KNEE: ICD-10-CM

## 2025-08-04 DIAGNOSIS — M25.562 CHRONIC PAIN OF LEFT KNEE: ICD-10-CM

## 2025-08-04 RX ORDER — MELOXICAM 7.5 MG/1
TABLET ORAL
Qty: 30 TABLET | Refills: 0 | Status: SHIPPED | OUTPATIENT
Start: 2025-08-04

## (undated) DEVICE — SENSR O2 OXIMAX FNGR A/ 18IN NONSTR

## (undated) DEVICE — TP UMB COTN 1/8X36 U12T

## (undated) DEVICE — 3M™ STERI-STRIP™ REINFORCED ADHESIVE SKIN CLOSURES, R1548, 1 IN X 5 IN (25 MM X 125 MM), 4 STRIPS/ENVELOPE: Brand: 3M™ STERI-STRIP™

## (undated) DEVICE — PREMIUM WET SKIN PREP TRAY: Brand: MEDLINE INDUSTRIES, INC.

## (undated) DEVICE — 3M™ IOBAN™ 2 ANTIMICROBIAL INCISE DRAPE 6651EZ: Brand: IOBAN™ 2

## (undated) DEVICE — 3M™ IOBAN™ 2 ANTIMICROBIAL INCISE DRAPE 6640EZ: Brand: IOBAN™ 2

## (undated) DEVICE — PREP SOL POVIDONE/IODINE BT 4OZ

## (undated) DEVICE — SPNG GZ WOVN 4X4IN 12PLY 10/BX STRL

## (undated) DEVICE — ENCORE® LATEX ORTHO SIZE 8, STERILE LATEX POWDER-FREE SURGICAL GLOVE: Brand: ENCORE

## (undated) DEVICE — RL DENTL WO/ STRNG LF STRL

## (undated) DEVICE — PK KN TOTL 40

## (undated) DEVICE — SUT VIC 0 CT1 36IN J946H

## (undated) DEVICE — SYS CLS SKIN PREMIERPRO EXOFINFUSION 22CM

## (undated) DEVICE — TUBING, SUCTION, 1/4" X 10', STRAIGHT: Brand: MEDLINE

## (undated) DEVICE — LAPAROSCOPIC SMOKE ELIMINATION DEVICE: Brand: PNEUVIEW XE

## (undated) DEVICE — LN SMPL CO2 SHTRM SD STREAM W/M LUER

## (undated) DEVICE — MEDI-VAC YANKAUER SUCTION HANDLE W/BULBOUS TIP: Brand: CARDINAL HEALTH

## (undated) DEVICE — APPL DURAPREP IODOPHOR APL 26ML

## (undated) DEVICE — TOTAL TRAY, 16FR 10ML SIL FOLEY, URN: Brand: MEDLINE

## (undated) DEVICE — SUT VIC 0 CT 36IN J958H

## (undated) DEVICE — KT DRN EVAC WND PVC PCH WTROC RND 10F400

## (undated) DEVICE — TRAP FLD MINIVAC MEGADYNE 100ML

## (undated) DEVICE — DRSNG SURESITE WNDW 2.38X2.75

## (undated) DEVICE — 3M™ STERI-STRIP™ COMPOUND BENZOIN TINCTURE 40 BAGS/CARTON 4 CARTONS/CASE C1544: Brand: 3M™ STERI-STRIP™

## (undated) DEVICE — CANNULA SEAL

## (undated) DEVICE — SEAL CANN CAM ENDOWRIST DAVINCI/S 8.5MM

## (undated) DEVICE — VISUALIZATION SYSTEM: Brand: CLEARIFY

## (undated) DEVICE — SOL NACL 0.9PCT 100ML SGL

## (undated) DEVICE — GLV SURG SENSICARE W/ALOE PF LF 8 STRL

## (undated) DEVICE — DRP SLUSH WARMR MACH 52X66IN OM-ORS-301

## (undated) DEVICE — ENDOPATH XCEL BLADELESS TROCARS WITH STABILITY SLEEVES: Brand: ENDOPATH XCEL

## (undated) DEVICE — DRSNG WND BORDR/ADHS NONADHR/GZ LF 4X14IN STRL

## (undated) DEVICE — Device

## (undated) DEVICE — SOL ANTISTICK CAUTRY ELECTROLUBE LF

## (undated) DEVICE — DISPOSABLE MONOPOLAR ENDOSCOPIC CORD 10 FT. (3M): Brand: KIRWAN

## (undated) DEVICE — SUT VIC 1 CT1 36IN J947H

## (undated) DEVICE — SUT VIC 2 TP1 54IN J880T

## (undated) DEVICE — SOL NACL 0.9PCT 1000ML

## (undated) DEVICE — 3M™ STERI-STRIP™ REINFORCED ADHESIVE SKIN CLOSURES, R1547, 1/2 IN X 4 IN (12 MM X 100 MM), 6 STRIPS/ENVELOPE: Brand: 3M™ STERI-STRIP™

## (undated) DEVICE — ADAPT CLN BIOGUARD AIR/H2O DISP

## (undated) DEVICE — HISTOFREEZER KIT, U.S., 36M1C: Brand: HISTOFREEZER

## (undated) DEVICE — VESSEL LOOPS X-RAY DETECTABLE: Brand: DEROYAL

## (undated) DEVICE — GLV SURG SENSICARE PI MIC PF SZ7 LF STRL

## (undated) DEVICE — NDL SPINE 22G 31/2IN BLK

## (undated) DEVICE — PROB CRYO ABL ICE MALL LSS BEND 10CM

## (undated) DEVICE — ANTIBACTERIAL UNDYED BRAIDED (POLYGLACTIN 910), SYNTHETIC ABSORBABLE SUTURE: Brand: COATED VICRYL

## (undated) DEVICE — BNDG ELAS ELITE V/CLOSE 6IN 5YD LF STRL

## (undated) DEVICE — KT ORCA ORCAPOD DISP STRL

## (undated) DEVICE — PROXIMATE RH ROTATING HEAD SKIN STAPLERS (35 WIDE) CONTAINS 35 STAINLESS STEEL STAPLES: Brand: PROXIMATE

## (undated) DEVICE — CANN O2 ETCO2 FITS ALL CONN CO2 SMPL A/ 7IN DISP LF

## (undated) DEVICE — GLV SURG SENSICARE W/ALOE PF LF 7.5 STRL

## (undated) DEVICE — PENCL E/S ULTRAVAC TELESCP NOSE HOLSTR 10FT

## (undated) DEVICE — 2, DISPOSABLE SUCTION/IRRIGATOR WITH DISPOSABLE TIP: Brand: STRYKEFLOW

## (undated) DEVICE — LOU THORACIC: Brand: MEDLINE INDUSTRIES, INC.

## (undated) DEVICE — APPL HEMO SURG ARISTA/AH/FLEXITIP XL 38CM

## (undated) DEVICE — 3M™ DURAPORE™ SURGICAL TAPE 1538-3, 3 INCH X 10 YARD (7,5CM X 9,1M), 4 ROLLS/BOX: Brand: 3M™ DURAPORE™

## (undated) DEVICE — ECHELON FLEX  POWERED VASCULAR STAPLER WITH ADVANCED PLACEMENT TIP, 35MM: Brand: ECHELON FLEX

## (undated) DEVICE — SUT SILK 0 PSL 18IN 580H

## (undated) DEVICE — APPL CHLORAPREP W/TINT 26ML ORNG

## (undated) DEVICE — STERILE PATIENT PROTECTIVE PAD FOR IMP® KNEE POSITIONERS & COHESIVE WRAP (10 / CASE): Brand: DE MAYO KNEE POSITIONER®

## (undated) DEVICE — STPLR SKIN VISISTAT WD 35CT

## (undated) DEVICE — UNDERCAST PADDING: Brand: DEROYAL

## (undated) DEVICE — CONMED DISPOSABLE BIPOLAR CABLE, 10' (3.05M): Brand: CONMED

## (undated) DEVICE — DRP ARM DAVINCI

## (undated) DEVICE — ECHELON FLEX45 POWERED ENDOPATH STAPLER ARTICULATING ENDOSCOPIC LINEAR CUTTER: Brand: ECHELON  FLEX ENDOPATH

## (undated) DEVICE — 28 FR STRAIGHT – SOFT PVC CATHETER: Brand: PVC THORACIC CATHETERS

## (undated) DEVICE — NEEDLE, QUINCKE 22GX3.5": Brand: MEDLINE INDUSTRIES, INC.

## (undated) DEVICE — VIOLET BRAIDED (POLYGLACTIN 910), SYNTHETIC ABSORBABLE SUTURE: Brand: COATED VICRYL

## (undated) DEVICE — SUT SILK 0 TIES 30IN A306H

## (undated) DEVICE — MAT FLR ABSORBENT LG 4FT 10 2.5FT

## (undated) DEVICE — DUAL CUT SAGITTAL BLADE

## (undated) DEVICE — GLV SURG SENSICARE PI PF LF 7 GRN STRL

## (undated) DEVICE — 3M™ STERI-DRAPE™ INSTRUMENT POUCH 1018L: Brand: STERI-DRAPE™

## (undated) DEVICE — LAPAROSCOPIC GAS CONDITIONING DEVICE.: Brand: INSUFLOW

## (undated) DEVICE — SYS PERFUS SEP PLATLT W TIPS CUST

## (undated) DEVICE — GLV SURG PREMIERPRO ORTHO LTX PF SZ7.5 BRN